# Patient Record
Sex: FEMALE | Race: WHITE | NOT HISPANIC OR LATINO | Employment: PART TIME | URBAN - METROPOLITAN AREA
[De-identification: names, ages, dates, MRNs, and addresses within clinical notes are randomized per-mention and may not be internally consistent; named-entity substitution may affect disease eponyms.]

---

## 2015-07-21 LAB — PAP SMEAR - HIM PATIENT REPORTED: NORMAL

## 2017-01-11 ENCOUNTER — OFFICE VISIT - HEALTHEAST (OUTPATIENT)
Dept: INTERNAL MEDICINE | Facility: CLINIC | Age: 57
End: 2017-01-11

## 2017-01-11 DIAGNOSIS — E87.6 HYPOKALEMIA: ICD-10-CM

## 2017-01-11 DIAGNOSIS — G47.00 INSOMNIA, UNSPECIFIED TYPE: ICD-10-CM

## 2017-01-11 DIAGNOSIS — E78.5 HYPERLIPIDEMIA: ICD-10-CM

## 2017-01-11 DIAGNOSIS — M81.0 OSTEOPOROSIS: ICD-10-CM

## 2017-01-11 LAB
CHOLEST SERPL-MCNC: 267 MG/DL
FASTING STATUS PATIENT QL REPORTED: YES
HDLC SERPL-MCNC: 76 MG/DL
LDLC SERPL CALC-MCNC: 170 MG/DL
TRIGL SERPL-MCNC: 103 MG/DL

## 2017-01-11 ASSESSMENT — MIFFLIN-ST. JEOR: SCORE: 1107.47

## 2017-01-12 ENCOUNTER — COMMUNICATION - HEALTHEAST (OUTPATIENT)
Dept: INTERNAL MEDICINE | Facility: CLINIC | Age: 57
End: 2017-01-12

## 2017-01-23 ENCOUNTER — AMBULATORY - HEALTHEAST (OUTPATIENT)
Dept: NURSING | Facility: CLINIC | Age: 57
End: 2017-01-23

## 2017-01-23 DIAGNOSIS — M81.0 OSTEOPOROSIS: ICD-10-CM

## 2017-01-24 ENCOUNTER — COMMUNICATION - HEALTHEAST (OUTPATIENT)
Dept: INTERNAL MEDICINE | Facility: CLINIC | Age: 57
End: 2017-01-24

## 2017-01-24 DIAGNOSIS — E78.5 HYPERLIPIDEMIA: ICD-10-CM

## 2017-01-25 ENCOUNTER — COMMUNICATION - HEALTHEAST (OUTPATIENT)
Dept: INTERNAL MEDICINE | Facility: CLINIC | Age: 57
End: 2017-01-25

## 2017-02-02 ENCOUNTER — RECORDS - HEALTHEAST (OUTPATIENT)
Dept: ADMINISTRATIVE | Facility: OTHER | Age: 57
End: 2017-02-02

## 2017-02-02 ENCOUNTER — TRANSFERRED RECORDS (OUTPATIENT)
Dept: HEALTH INFORMATION MANAGEMENT | Facility: CLINIC | Age: 57
End: 2017-02-02

## 2017-02-03 ENCOUNTER — COMMUNICATION - HEALTHEAST (OUTPATIENT)
Dept: INTERNAL MEDICINE | Facility: CLINIC | Age: 57
End: 2017-02-03

## 2017-02-06 ENCOUNTER — OFFICE VISIT - HEALTHEAST (OUTPATIENT)
Dept: INTERNAL MEDICINE | Facility: CLINIC | Age: 57
End: 2017-02-06

## 2017-02-06 DIAGNOSIS — Z92.25 PERSONAL HISTORY OF IMMUNOSUPRESSION THERAPY: ICD-10-CM

## 2017-02-06 DIAGNOSIS — J02.9 SORE THROAT: ICD-10-CM

## 2017-02-06 ASSESSMENT — MIFFLIN-ST. JEOR: SCORE: 1119.26

## 2017-03-08 ENCOUNTER — RECORDS - HEALTHEAST (OUTPATIENT)
Dept: ADMINISTRATIVE | Facility: OTHER | Age: 57
End: 2017-03-08

## 2017-03-09 ENCOUNTER — COMMUNICATION - HEALTHEAST (OUTPATIENT)
Dept: INTERNAL MEDICINE | Facility: CLINIC | Age: 57
End: 2017-03-09

## 2017-06-06 ENCOUNTER — RECORDS - HEALTHEAST (OUTPATIENT)
Dept: ADMINISTRATIVE | Facility: OTHER | Age: 57
End: 2017-06-06

## 2017-06-09 ENCOUNTER — OFFICE VISIT - HEALTHEAST (OUTPATIENT)
Dept: INTERNAL MEDICINE | Facility: CLINIC | Age: 57
End: 2017-06-09

## 2017-06-09 ENCOUNTER — COMMUNICATION - HEALTHEAST (OUTPATIENT)
Dept: INTERNAL MEDICINE | Facility: CLINIC | Age: 57
End: 2017-06-09

## 2017-06-09 DIAGNOSIS — M35.00 SICCA SYNDROME (H): ICD-10-CM

## 2017-06-09 DIAGNOSIS — R93.1 AGATSTON CORONARY ARTERY CALCIUM SCORE LESS THAN 100: ICD-10-CM

## 2017-06-09 DIAGNOSIS — E78.5 HYPERLIPIDEMIA: ICD-10-CM

## 2017-06-09 DIAGNOSIS — M54.6 RIGHT-SIDED THORACIC BACK PAIN: ICD-10-CM

## 2017-06-09 LAB
CHOLEST SERPL-MCNC: 183 MG/DL
FASTING STATUS PATIENT QL REPORTED: YES
HDLC SERPL-MCNC: 71 MG/DL
LDLC SERPL CALC-MCNC: 100 MG/DL
TRIGL SERPL-MCNC: 62 MG/DL

## 2017-06-09 ASSESSMENT — MIFFLIN-ST. JEOR: SCORE: 1116.54

## 2017-06-12 ENCOUNTER — RECORDS - HEALTHEAST (OUTPATIENT)
Dept: ADMINISTRATIVE | Facility: OTHER | Age: 57
End: 2017-06-12

## 2017-06-14 ENCOUNTER — COMMUNICATION - HEALTHEAST (OUTPATIENT)
Dept: INTERNAL MEDICINE | Facility: CLINIC | Age: 57
End: 2017-06-14

## 2017-06-14 DIAGNOSIS — F19.982 INSOMNIA DUE TO DRUG (H): ICD-10-CM

## 2017-07-11 ENCOUNTER — COMMUNICATION - HEALTHEAST (OUTPATIENT)
Dept: INTERNAL MEDICINE | Facility: CLINIC | Age: 57
End: 2017-07-11

## 2017-07-26 ENCOUNTER — AMBULATORY - HEALTHEAST (OUTPATIENT)
Dept: NURSING | Facility: CLINIC | Age: 57
End: 2017-07-26

## 2017-07-28 ENCOUNTER — RECORDS - HEALTHEAST (OUTPATIENT)
Dept: ADMINISTRATIVE | Facility: OTHER | Age: 57
End: 2017-07-28

## 2017-07-31 ENCOUNTER — AMBULATORY - HEALTHEAST (OUTPATIENT)
Dept: MULTI SPECIALTY CLINIC | Facility: CLINIC | Age: 57
End: 2017-07-31

## 2017-07-31 LAB — PAP SMEAR - HIM PATIENT REPORTED: NORMAL

## 2017-08-07 ENCOUNTER — RECORDS - HEALTHEAST (OUTPATIENT)
Dept: ADMINISTRATIVE | Facility: OTHER | Age: 57
End: 2017-08-07

## 2017-09-11 ENCOUNTER — RECORDS - HEALTHEAST (OUTPATIENT)
Dept: ADMINISTRATIVE | Facility: OTHER | Age: 57
End: 2017-09-11

## 2017-09-20 ENCOUNTER — AMBULATORY - HEALTHEAST (OUTPATIENT)
Dept: NURSING | Facility: CLINIC | Age: 57
End: 2017-09-20

## 2017-10-06 ENCOUNTER — RECORDS - HEALTHEAST (OUTPATIENT)
Dept: ADMINISTRATIVE | Facility: OTHER | Age: 57
End: 2017-10-06

## 2017-10-11 ENCOUNTER — RECORDS - HEALTHEAST (OUTPATIENT)
Dept: ADMINISTRATIVE | Facility: OTHER | Age: 57
End: 2017-10-11

## 2017-11-10 ENCOUNTER — RECORDS - HEALTHEAST (OUTPATIENT)
Dept: ADMINISTRATIVE | Facility: OTHER | Age: 57
End: 2017-11-10

## 2017-12-11 ENCOUNTER — RECORDS - HEALTHEAST (OUTPATIENT)
Dept: ADMINISTRATIVE | Facility: OTHER | Age: 57
End: 2017-12-11

## 2017-12-15 ENCOUNTER — OFFICE VISIT - HEALTHEAST (OUTPATIENT)
Dept: INTERNAL MEDICINE | Facility: CLINIC | Age: 57
End: 2017-12-15

## 2017-12-15 DIAGNOSIS — M81.8 OTHER OSTEOPOROSIS WITHOUT CURRENT PATHOLOGICAL FRACTURE: ICD-10-CM

## 2017-12-15 DIAGNOSIS — E78.5 HYPERLIPIDEMIA, UNSPECIFIED HYPERLIPIDEMIA TYPE: ICD-10-CM

## 2017-12-15 DIAGNOSIS — Z00.00 HEALTH CARE MAINTENANCE: ICD-10-CM

## 2017-12-15 DIAGNOSIS — F19.982 INSOMNIA DUE TO DRUG (H): ICD-10-CM

## 2017-12-15 DIAGNOSIS — Z79.899 MEDICATION MANAGEMENT: ICD-10-CM

## 2017-12-15 DIAGNOSIS — M35.00 SICCA SYNDROME (H): ICD-10-CM

## 2017-12-15 DIAGNOSIS — R93.1 AGATSTON CORONARY ARTERY CALCIUM SCORE LESS THAN 100: ICD-10-CM

## 2017-12-15 LAB
CHOLEST SERPL-MCNC: 240 MG/DL
FASTING STATUS PATIENT QL REPORTED: YES
HDLC SERPL-MCNC: 100 MG/DL
LDLC SERPL CALC-MCNC: 125 MG/DL
TRIGL SERPL-MCNC: 74 MG/DL

## 2017-12-15 RX ORDER — CETIRIZINE HYDROCHLORIDE 10 MG/1
10 TABLET ORAL DAILY
Status: SHIPPED | COMMUNITY
Start: 2017-12-15

## 2017-12-15 ASSESSMENT — MIFFLIN-ST. JEOR: SCORE: 1133.1

## 2017-12-18 ENCOUNTER — COMMUNICATION - HEALTHEAST (OUTPATIENT)
Dept: INTERNAL MEDICINE | Facility: CLINIC | Age: 57
End: 2017-12-18

## 2017-12-27 ENCOUNTER — COMMUNICATION - HEALTHEAST (OUTPATIENT)
Dept: INTERNAL MEDICINE | Facility: CLINIC | Age: 57
End: 2017-12-27

## 2018-01-23 ENCOUNTER — AMBULATORY - HEALTHEAST (OUTPATIENT)
Dept: INTERNAL MEDICINE | Facility: CLINIC | Age: 58
End: 2018-01-23

## 2018-01-23 DIAGNOSIS — M81.0 OSTEOPOROSIS: ICD-10-CM

## 2018-01-29 ENCOUNTER — AMBULATORY - HEALTHEAST (OUTPATIENT)
Dept: NURSING | Facility: CLINIC | Age: 58
End: 2018-01-29

## 2018-02-09 ENCOUNTER — COMMUNICATION - HEALTHEAST (OUTPATIENT)
Dept: INTERNAL MEDICINE | Facility: CLINIC | Age: 58
End: 2018-02-09

## 2018-02-09 DIAGNOSIS — E78.5 HYPERLIPIDEMIA: ICD-10-CM

## 2018-03-12 ENCOUNTER — RECORDS - HEALTHEAST (OUTPATIENT)
Dept: ADMINISTRATIVE | Facility: OTHER | Age: 58
End: 2018-03-12

## 2018-04-30 ENCOUNTER — RECORDS - HEALTHEAST (OUTPATIENT)
Dept: ADMINISTRATIVE | Facility: OTHER | Age: 58
End: 2018-04-30

## 2018-06-11 ENCOUNTER — RECORDS - HEALTHEAST (OUTPATIENT)
Dept: ADMINISTRATIVE | Facility: OTHER | Age: 58
End: 2018-06-11

## 2018-06-12 ENCOUNTER — COMMUNICATION - HEALTHEAST (OUTPATIENT)
Dept: INTERNAL MEDICINE | Facility: CLINIC | Age: 58
End: 2018-06-12

## 2018-06-12 ENCOUNTER — OFFICE VISIT - HEALTHEAST (OUTPATIENT)
Dept: INTERNAL MEDICINE | Facility: CLINIC | Age: 58
End: 2018-06-12

## 2018-06-12 DIAGNOSIS — M81.8 OTHER OSTEOPOROSIS WITHOUT CURRENT PATHOLOGICAL FRACTURE: ICD-10-CM

## 2018-06-12 DIAGNOSIS — E78.5 HYPERLIPIDEMIA, UNSPECIFIED HYPERLIPIDEMIA TYPE: ICD-10-CM

## 2018-06-12 DIAGNOSIS — J84.89 NSIP (NONSPECIFIC INTERSTITIAL PNEUMONIA) (H): ICD-10-CM

## 2018-06-12 DIAGNOSIS — M35.00 SICCA SYNDROME (H): ICD-10-CM

## 2018-06-12 LAB
ALBUMIN SERPL-MCNC: 4.2 G/DL (ref 3.5–5)
ALP SERPL-CCNC: 40 U/L (ref 45–120)
ALT SERPL W P-5'-P-CCNC: 19 U/L (ref 0–45)
ANION GAP SERPL CALCULATED.3IONS-SCNC: 10 MMOL/L (ref 5–18)
AST SERPL W P-5'-P-CCNC: 27 U/L (ref 0–40)
BILIRUB DIRECT SERPL-MCNC: 0.2 MG/DL
BILIRUB SERPL-MCNC: 0.6 MG/DL (ref 0–1)
BUN SERPL-MCNC: 21 MG/DL (ref 8–22)
CALCIUM SERPL-MCNC: 10.1 MG/DL (ref 8.5–10.5)
CHLORIDE BLD-SCNC: 101 MMOL/L (ref 98–107)
CHOLEST SERPL-MCNC: 200 MG/DL
CO2 SERPL-SCNC: 31 MMOL/L (ref 22–31)
CREAT SERPL-MCNC: 1.05 MG/DL (ref 0.6–1.1)
ERYTHROCYTE [DISTWIDTH] IN BLOOD BY AUTOMATED COUNT: 11.7 % (ref 11–14.5)
FASTING STATUS PATIENT QL REPORTED: YES
GFR SERPL CREATININE-BSD FRML MDRD: 54 ML/MIN/1.73M2
GLUCOSE BLD-MCNC: 80 MG/DL (ref 70–125)
HCT VFR BLD AUTO: 41.2 % (ref 35–47)
HDLC SERPL-MCNC: 88 MG/DL
HGB BLD-MCNC: 14.2 G/DL (ref 12–16)
LDLC SERPL CALC-MCNC: 99 MG/DL
MCH RBC QN AUTO: 32.1 PG (ref 27–34)
MCHC RBC AUTO-ENTMCNC: 34.4 G/DL (ref 32–36)
MCV RBC AUTO: 93 FL (ref 80–100)
PLATELET # BLD AUTO: 208 THOU/UL (ref 140–440)
PMV BLD AUTO: 6.8 FL (ref 7–10)
POTASSIUM BLD-SCNC: 3.5 MMOL/L (ref 3.5–5)
PROT SERPL-MCNC: 6.5 G/DL (ref 6–8)
RBC # BLD AUTO: 4.41 MILL/UL (ref 3.8–5.4)
SODIUM SERPL-SCNC: 142 MMOL/L (ref 136–145)
TRIGL SERPL-MCNC: 67 MG/DL
WBC: 4.2 THOU/UL (ref 4–11)

## 2018-07-05 ENCOUNTER — COMMUNICATION - HEALTHEAST (OUTPATIENT)
Dept: INTERNAL MEDICINE | Facility: CLINIC | Age: 58
End: 2018-07-05

## 2018-07-09 ENCOUNTER — COMMUNICATION - HEALTHEAST (OUTPATIENT)
Dept: PHYSICAL MEDICINE AND REHAB | Facility: CLINIC | Age: 58
End: 2018-07-09

## 2018-07-09 ENCOUNTER — HOSPITAL ENCOUNTER (OUTPATIENT)
Dept: PHYSICAL MEDICINE AND REHAB | Facility: CLINIC | Age: 58
Discharge: HOME | End: 2018-07-09
Attending: PHYSICIAN ASSISTANT

## 2018-07-09 DIAGNOSIS — M54.16 LUMBAR RADICULITIS: ICD-10-CM

## 2018-07-09 DIAGNOSIS — M47.816 FACET SYNDROME, LUMBAR: ICD-10-CM

## 2018-07-09 DIAGNOSIS — M54.50 LOWER BACK PAIN: ICD-10-CM

## 2018-07-09 ASSESSMENT — MIFFLIN-ST. JEOR: SCORE: 1129.47

## 2018-07-10 ENCOUNTER — COMMUNICATION - HEALTHEAST (OUTPATIENT)
Dept: INTERNAL MEDICINE | Facility: CLINIC | Age: 58
End: 2018-07-10

## 2018-07-10 DIAGNOSIS — M54.6 RIGHT-SIDED THORACIC BACK PAIN: ICD-10-CM

## 2018-07-19 ENCOUNTER — COMMUNICATION - HEALTHEAST (OUTPATIENT)
Dept: INTERNAL MEDICINE | Facility: CLINIC | Age: 58
End: 2018-07-19

## 2018-07-19 DIAGNOSIS — R10.84 ABDOMINAL PAIN, GENERALIZED: ICD-10-CM

## 2018-07-23 ENCOUNTER — AMBULATORY - HEALTHEAST (OUTPATIENT)
Dept: INTERNAL MEDICINE | Facility: CLINIC | Age: 58
End: 2018-07-23

## 2018-07-23 DIAGNOSIS — M81.0 OSTEOPOROSIS: ICD-10-CM

## 2018-07-26 ENCOUNTER — AMBULATORY - HEALTHEAST (OUTPATIENT)
Dept: INTERNAL MEDICINE | Facility: CLINIC | Age: 58
End: 2018-07-26

## 2018-07-26 DIAGNOSIS — R10.13 DYSPEPSIA: ICD-10-CM

## 2018-07-30 ENCOUNTER — AMBULATORY - HEALTHEAST (OUTPATIENT)
Dept: NURSING | Facility: CLINIC | Age: 58
End: 2018-07-30

## 2018-07-30 ENCOUNTER — RECORDS - HEALTHEAST (OUTPATIENT)
Dept: ADMINISTRATIVE | Facility: OTHER | Age: 58
End: 2018-07-30

## 2018-07-30 ENCOUNTER — COMMUNICATION - HEALTHEAST (OUTPATIENT)
Dept: INTERNAL MEDICINE | Facility: CLINIC | Age: 58
End: 2018-07-30

## 2018-08-01 ENCOUNTER — RECORDS - HEALTHEAST (OUTPATIENT)
Dept: ADMINISTRATIVE | Facility: OTHER | Age: 58
End: 2018-08-01

## 2018-08-07 ENCOUNTER — COMMUNICATION - HEALTHEAST (OUTPATIENT)
Dept: INTERNAL MEDICINE | Facility: CLINIC | Age: 58
End: 2018-08-07

## 2018-08-07 DIAGNOSIS — E78.5 HYPERLIPIDEMIA: ICD-10-CM

## 2018-08-15 ENCOUNTER — RECORDS - HEALTHEAST (OUTPATIENT)
Dept: ADMINISTRATIVE | Facility: OTHER | Age: 58
End: 2018-08-15

## 2018-09-04 ENCOUNTER — COMMUNICATION - HEALTHEAST (OUTPATIENT)
Dept: INTERNAL MEDICINE | Facility: CLINIC | Age: 58
End: 2018-09-04

## 2018-09-04 DIAGNOSIS — Z79.899 MEDICATION MANAGEMENT: ICD-10-CM

## 2018-09-10 ENCOUNTER — RECORDS - HEALTHEAST (OUTPATIENT)
Dept: ADMINISTRATIVE | Facility: OTHER | Age: 58
End: 2018-09-10

## 2018-09-13 ENCOUNTER — RECORDS - HEALTHEAST (OUTPATIENT)
Dept: ADMINISTRATIVE | Facility: OTHER | Age: 58
End: 2018-09-13

## 2018-10-15 ENCOUNTER — RECORDS - HEALTHEAST (OUTPATIENT)
Dept: ADMINISTRATIVE | Facility: OTHER | Age: 58
End: 2018-10-15

## 2018-10-22 ENCOUNTER — RECORDS - HEALTHEAST (OUTPATIENT)
Dept: ADMINISTRATIVE | Facility: OTHER | Age: 58
End: 2018-10-22

## 2018-10-26 ENCOUNTER — RECORDS - HEALTHEAST (OUTPATIENT)
Dept: ADMINISTRATIVE | Facility: OTHER | Age: 58
End: 2018-10-26

## 2018-11-05 ENCOUNTER — RECORDS - HEALTHEAST (OUTPATIENT)
Dept: ADMINISTRATIVE | Facility: OTHER | Age: 58
End: 2018-11-05

## 2018-12-03 ENCOUNTER — RECORDS - HEALTHEAST (OUTPATIENT)
Dept: ADMINISTRATIVE | Facility: OTHER | Age: 58
End: 2018-12-03

## 2018-12-03 ENCOUNTER — RECORDS - HEALTHEAST (OUTPATIENT)
Dept: BONE DENSITY | Facility: CLINIC | Age: 58
End: 2018-12-03

## 2018-12-03 DIAGNOSIS — Z79.899 OTHER LONG TERM (CURRENT) DRUG THERAPY: ICD-10-CM

## 2018-12-10 ENCOUNTER — RECORDS - HEALTHEAST (OUTPATIENT)
Dept: ADMINISTRATIVE | Facility: OTHER | Age: 58
End: 2018-12-10

## 2018-12-11 ENCOUNTER — RECORDS - HEALTHEAST (OUTPATIENT)
Dept: ADMINISTRATIVE | Facility: OTHER | Age: 58
End: 2018-12-11

## 2018-12-12 ENCOUNTER — OFFICE VISIT - HEALTHEAST (OUTPATIENT)
Dept: INTERNAL MEDICINE | Facility: CLINIC | Age: 58
End: 2018-12-12

## 2018-12-12 DIAGNOSIS — M35.00 SICCA SYNDROME (H): ICD-10-CM

## 2018-12-12 DIAGNOSIS — Z00.00 PREVENTATIVE HEALTH CARE: ICD-10-CM

## 2018-12-12 DIAGNOSIS — M81.0 SENILE OSTEOPOROSIS: ICD-10-CM

## 2018-12-12 DIAGNOSIS — Z51.81 MEDICATION MONITORING ENCOUNTER: ICD-10-CM

## 2018-12-12 DIAGNOSIS — R93.1 AGATSTON CORONARY ARTERY CALCIUM SCORE LESS THAN 100: ICD-10-CM

## 2018-12-12 DIAGNOSIS — E78.00 HYPERCHOLESTEREMIA: ICD-10-CM

## 2018-12-12 LAB
ALBUMIN SERPL-MCNC: 4.1 G/DL (ref 3.5–5)
ALP SERPL-CCNC: 53 U/L (ref 45–120)
ALT SERPL W P-5'-P-CCNC: 13 U/L (ref 0–45)
ANION GAP SERPL CALCULATED.3IONS-SCNC: 10 MMOL/L (ref 5–18)
AST SERPL W P-5'-P-CCNC: 27 U/L (ref 0–40)
BILIRUB SERPL-MCNC: 0.8 MG/DL (ref 0–1)
BUN SERPL-MCNC: 15 MG/DL (ref 8–22)
CALCIUM SERPL-MCNC: 9.3 MG/DL (ref 8.5–10.5)
CHLORIDE BLD-SCNC: 102 MMOL/L (ref 98–107)
CHOLEST SERPL-MCNC: 195 MG/DL
CO2 SERPL-SCNC: 30 MMOL/L (ref 22–31)
CREAT SERPL-MCNC: 0.83 MG/DL (ref 0.6–1.1)
FASTING STATUS PATIENT QL REPORTED: YES
GFR SERPL CREATININE-BSD FRML MDRD: >60 ML/MIN/1.73M2
GLUCOSE BLD-MCNC: 88 MG/DL (ref 70–125)
HBA1C MFR BLD: 5.5 % (ref 3.5–6)
HDLC SERPL-MCNC: 99 MG/DL
LDLC SERPL CALC-MCNC: 85 MG/DL
POTASSIUM BLD-SCNC: 3.4 MMOL/L (ref 3.5–5)
PROT SERPL-MCNC: 6.6 G/DL (ref 6–8)
SODIUM SERPL-SCNC: 142 MMOL/L (ref 136–145)
TRIGL SERPL-MCNC: 53 MG/DL
TSH SERPL DL<=0.005 MIU/L-ACNC: 0.93 UIU/ML (ref 0.3–5)

## 2018-12-12 ASSESSMENT — MIFFLIN-ST. JEOR: SCORE: 1110.64

## 2018-12-13 LAB
25(OH)D3 SERPL-MCNC: 60.8 NG/ML (ref 30–80)
25(OH)D3 SERPL-MCNC: 60.8 NG/ML (ref 30–80)

## 2018-12-20 ENCOUNTER — COMMUNICATION - HEALTHEAST (OUTPATIENT)
Dept: INTERNAL MEDICINE | Facility: CLINIC | Age: 58
End: 2018-12-20

## 2018-12-20 DIAGNOSIS — F19.982 INSOMNIA DUE TO DRUG (H): ICD-10-CM

## 2018-12-22 ENCOUNTER — COMMUNICATION - HEALTHEAST (OUTPATIENT)
Dept: INTERNAL MEDICINE | Facility: CLINIC | Age: 58
End: 2018-12-22

## 2019-01-08 ENCOUNTER — COMMUNICATION - HEALTHEAST (OUTPATIENT)
Dept: INTERNAL MEDICINE | Facility: CLINIC | Age: 59
End: 2019-01-08

## 2019-01-29 ENCOUNTER — COMMUNICATION - HEALTHEAST (OUTPATIENT)
Dept: INTERNAL MEDICINE | Facility: CLINIC | Age: 59
End: 2019-01-29

## 2019-01-31 ENCOUNTER — AMBULATORY - HEALTHEAST (OUTPATIENT)
Dept: NURSING | Facility: CLINIC | Age: 59
End: 2019-01-31

## 2019-03-11 ENCOUNTER — RECORDS - HEALTHEAST (OUTPATIENT)
Dept: ADMINISTRATIVE | Facility: OTHER | Age: 59
End: 2019-03-11

## 2019-03-22 ENCOUNTER — RECORDS - HEALTHEAST (OUTPATIENT)
Dept: ADMINISTRATIVE | Facility: OTHER | Age: 59
End: 2019-03-22

## 2019-04-06 ENCOUNTER — COMMUNICATION - HEALTHEAST (OUTPATIENT)
Dept: INTERNAL MEDICINE | Facility: CLINIC | Age: 59
End: 2019-04-06

## 2019-04-06 DIAGNOSIS — G47.00 INSOMNIA: ICD-10-CM

## 2019-04-09 ENCOUNTER — COMMUNICATION - HEALTHEAST (OUTPATIENT)
Dept: INTERNAL MEDICINE | Facility: CLINIC | Age: 59
End: 2019-04-09

## 2019-04-15 ENCOUNTER — RECORDS - HEALTHEAST (OUTPATIENT)
Dept: ADMINISTRATIVE | Facility: OTHER | Age: 59
End: 2019-04-15

## 2019-05-15 ENCOUNTER — COMMUNICATION - HEALTHEAST (OUTPATIENT)
Dept: INTERNAL MEDICINE | Facility: CLINIC | Age: 59
End: 2019-05-15

## 2019-05-15 DIAGNOSIS — E78.5 HYPERLIPIDEMIA: ICD-10-CM

## 2019-06-10 ENCOUNTER — RECORDS - HEALTHEAST (OUTPATIENT)
Dept: ADMINISTRATIVE | Facility: OTHER | Age: 59
End: 2019-06-10

## 2019-06-11 ENCOUNTER — OFFICE VISIT - HEALTHEAST (OUTPATIENT)
Dept: INTERNAL MEDICINE | Facility: CLINIC | Age: 59
End: 2019-06-11

## 2019-06-11 ENCOUNTER — RECORDS - HEALTHEAST (OUTPATIENT)
Dept: ADMINISTRATIVE | Facility: OTHER | Age: 59
End: 2019-06-11

## 2019-06-11 DIAGNOSIS — Z51.81 MEDICATION MONITORING ENCOUNTER: ICD-10-CM

## 2019-06-11 DIAGNOSIS — E87.6 HYPOKALEMIA: ICD-10-CM

## 2019-06-11 DIAGNOSIS — E78.5 HYPERLIPIDEMIA LDL GOAL <130: ICD-10-CM

## 2019-06-11 DIAGNOSIS — M35.00 SICCA SYNDROME (H): ICD-10-CM

## 2019-06-11 DIAGNOSIS — R93.1 AGATSTON CORONARY ARTERY CALCIUM SCORE LESS THAN 100: ICD-10-CM

## 2019-06-11 LAB
CHOLEST SERPL-MCNC: 189 MG/DL
FASTING STATUS PATIENT QL REPORTED: YES
HDLC SERPL-MCNC: 83 MG/DL
LDLC SERPL CALC-MCNC: 92 MG/DL
TRIGL SERPL-MCNC: 70 MG/DL

## 2019-06-11 ASSESSMENT — MIFFLIN-ST. JEOR: SCORE: 1120.62

## 2019-06-17 ENCOUNTER — COMMUNICATION - HEALTHEAST (OUTPATIENT)
Dept: INTERNAL MEDICINE | Facility: CLINIC | Age: 59
End: 2019-06-17

## 2019-06-17 ENCOUNTER — AMBULATORY - HEALTHEAST (OUTPATIENT)
Dept: INTERNAL MEDICINE | Facility: CLINIC | Age: 59
End: 2019-06-17

## 2019-06-17 DIAGNOSIS — E87.6 HYPOKALEMIA: ICD-10-CM

## 2019-06-18 ENCOUNTER — AMBULATORY - HEALTHEAST (OUTPATIENT)
Dept: LAB | Facility: CLINIC | Age: 59
End: 2019-06-18

## 2019-06-18 DIAGNOSIS — E87.6 HYPOKALEMIA: ICD-10-CM

## 2019-06-18 LAB
ANION GAP SERPL CALCULATED.3IONS-SCNC: 8 MMOL/L (ref 5–18)
BUN SERPL-MCNC: 13 MG/DL (ref 8–22)
CALCIUM SERPL-MCNC: 10.3 MG/DL (ref 8.5–10.5)
CHLORIDE BLD-SCNC: 100 MMOL/L (ref 98–107)
CO2 SERPL-SCNC: 32 MMOL/L (ref 22–31)
CREAT SERPL-MCNC: 0.96 MG/DL (ref 0.6–1.1)
GFR SERPL CREATININE-BSD FRML MDRD: 59 ML/MIN/1.73M2
GLUCOSE BLD-MCNC: 89 MG/DL (ref 70–125)
POTASSIUM BLD-SCNC: 3.9 MMOL/L (ref 3.5–5)
SODIUM SERPL-SCNC: 140 MMOL/L (ref 136–145)

## 2019-07-01 ENCOUNTER — COMMUNICATION - HEALTHEAST (OUTPATIENT)
Dept: INTERNAL MEDICINE | Facility: CLINIC | Age: 59
End: 2019-07-01

## 2019-07-01 DIAGNOSIS — I10 BENIGN ESSENTIAL HYPERTENSION: ICD-10-CM

## 2019-08-08 ENCOUNTER — AMBULATORY - HEALTHEAST (OUTPATIENT)
Dept: INTERNAL MEDICINE | Facility: CLINIC | Age: 59
End: 2019-08-08

## 2019-08-08 DIAGNOSIS — M81.0 OSTEOPOROSIS: ICD-10-CM

## 2019-08-12 ENCOUNTER — COMMUNICATION - HEALTHEAST (OUTPATIENT)
Dept: INTERNAL MEDICINE | Facility: CLINIC | Age: 59
End: 2019-08-12

## 2019-08-12 ENCOUNTER — AMBULATORY - HEALTHEAST (OUTPATIENT)
Dept: NURSING | Facility: CLINIC | Age: 59
End: 2019-08-12

## 2019-09-09 ENCOUNTER — RECORDS - HEALTHEAST (OUTPATIENT)
Dept: ADMINISTRATIVE | Facility: OTHER | Age: 59
End: 2019-09-09

## 2019-09-10 ENCOUNTER — RECORDS - HEALTHEAST (OUTPATIENT)
Dept: ADMINISTRATIVE | Facility: OTHER | Age: 59
End: 2019-09-10

## 2019-09-26 ENCOUNTER — COMMUNICATION - HEALTHEAST (OUTPATIENT)
Dept: INTERNAL MEDICINE | Facility: CLINIC | Age: 59
End: 2019-09-26

## 2019-09-26 DIAGNOSIS — G47.00 INSOMNIA: ICD-10-CM

## 2019-10-23 ENCOUNTER — RECORDS - HEALTHEAST (OUTPATIENT)
Dept: ADMINISTRATIVE | Facility: OTHER | Age: 59
End: 2019-10-23

## 2019-10-31 ENCOUNTER — OFFICE VISIT - HEALTHEAST (OUTPATIENT)
Dept: FAMILY MEDICINE | Facility: CLINIC | Age: 59
End: 2019-10-31

## 2019-10-31 DIAGNOSIS — B34.9 VIRAL SYNDROME: ICD-10-CM

## 2019-10-31 ASSESSMENT — MIFFLIN-ST. JEOR: SCORE: 1126.07

## 2019-11-07 ENCOUNTER — COMMUNICATION - HEALTHEAST (OUTPATIENT)
Dept: INTERNAL MEDICINE | Facility: CLINIC | Age: 59
End: 2019-11-07

## 2019-11-08 ENCOUNTER — COMMUNICATION - HEALTHEAST (OUTPATIENT)
Dept: INTERNAL MEDICINE | Facility: CLINIC | Age: 59
End: 2019-11-08

## 2019-11-08 ENCOUNTER — RECORDS - HEALTHEAST (OUTPATIENT)
Dept: ADMINISTRATIVE | Facility: OTHER | Age: 59
End: 2019-11-08

## 2019-11-08 ENCOUNTER — OFFICE VISIT - HEALTHEAST (OUTPATIENT)
Dept: INTERNAL MEDICINE | Facility: CLINIC | Age: 59
End: 2019-11-08

## 2019-11-08 ENCOUNTER — RECORDS - HEALTHEAST (OUTPATIENT)
Dept: GENERAL RADIOLOGY | Facility: CLINIC | Age: 59
End: 2019-11-08

## 2019-11-08 DIAGNOSIS — M79.672 FOOT PAIN, BILATERAL: ICD-10-CM

## 2019-11-08 DIAGNOSIS — J84.89 NSIP (NONSPECIFIC INTERSTITIAL PNEUMONIA) (H): ICD-10-CM

## 2019-11-08 DIAGNOSIS — M79.671 FOOT PAIN, BILATERAL: ICD-10-CM

## 2019-11-08 DIAGNOSIS — R07.81 PLEURODYNIA: ICD-10-CM

## 2019-11-08 DIAGNOSIS — R07.81 RIB PAIN: ICD-10-CM

## 2019-11-08 DIAGNOSIS — S22.31XA CLOSED FRACTURE OF ONE RIB OF RIGHT SIDE, INITIAL ENCOUNTER: ICD-10-CM

## 2019-11-16 ENCOUNTER — COMMUNICATION - HEALTHEAST (OUTPATIENT)
Dept: INTERNAL MEDICINE | Facility: CLINIC | Age: 59
End: 2019-11-16

## 2019-11-16 DIAGNOSIS — E78.5 HYPERLIPIDEMIA: ICD-10-CM

## 2019-11-18 ENCOUNTER — COMMUNICATION - HEALTHEAST (OUTPATIENT)
Dept: INTERNAL MEDICINE | Facility: CLINIC | Age: 59
End: 2019-11-18

## 2019-11-18 DIAGNOSIS — F19.982 INSOMNIA DUE TO DRUG (H): ICD-10-CM

## 2019-12-04 ENCOUNTER — COMMUNICATION - HEALTHEAST (OUTPATIENT)
Dept: INTERNAL MEDICINE | Facility: CLINIC | Age: 59
End: 2019-12-04

## 2019-12-04 DIAGNOSIS — M89.8X7: ICD-10-CM

## 2019-12-04 DIAGNOSIS — M21.70 LEG LENGTH DISCREPANCY: ICD-10-CM

## 2019-12-09 ENCOUNTER — RECORDS - HEALTHEAST (OUTPATIENT)
Dept: ADMINISTRATIVE | Facility: OTHER | Age: 59
End: 2019-12-09

## 2019-12-11 ENCOUNTER — OFFICE VISIT - HEALTHEAST (OUTPATIENT)
Dept: INTERNAL MEDICINE | Facility: CLINIC | Age: 59
End: 2019-12-11

## 2019-12-11 DIAGNOSIS — Z00.00 ENCOUNTER FOR PREVENTIVE CARE: ICD-10-CM

## 2019-12-11 DIAGNOSIS — M81.8 OTHER OSTEOPOROSIS WITHOUT CURRENT PATHOLOGICAL FRACTURE: ICD-10-CM

## 2019-12-11 DIAGNOSIS — E87.6 HYPOKALEMIA: ICD-10-CM

## 2019-12-11 DIAGNOSIS — J84.89 NSIP (NONSPECIFIC INTERSTITIAL PNEUMONIA) (H): ICD-10-CM

## 2019-12-11 DIAGNOSIS — H61.22 IMPACTED CERUMEN OF LEFT EAR: ICD-10-CM

## 2019-12-11 DIAGNOSIS — R93.1 AGATSTON CORONARY ARTERY CALCIUM SCORE LESS THAN 100: ICD-10-CM

## 2019-12-11 LAB
ANION GAP SERPL CALCULATED.3IONS-SCNC: 10 MMOL/L (ref 5–18)
BUN SERPL-MCNC: 13 MG/DL (ref 8–22)
CALCIUM SERPL-MCNC: 9.1 MG/DL (ref 8.5–10.5)
CHLORIDE BLD-SCNC: 100 MMOL/L (ref 98–107)
CHOLEST SERPL-MCNC: 182 MG/DL
CO2 SERPL-SCNC: 31 MMOL/L (ref 22–31)
CREAT SERPL-MCNC: 0.94 MG/DL (ref 0.6–1.1)
FASTING STATUS PATIENT QL REPORTED: YES
GFR SERPL CREATININE-BSD FRML MDRD: >60 ML/MIN/1.73M2
GLUCOSE BLD-MCNC: 91 MG/DL (ref 70–125)
HDLC SERPL-MCNC: 73 MG/DL
LDLC SERPL CALC-MCNC: 93 MG/DL
POTASSIUM BLD-SCNC: 3.3 MMOL/L (ref 3.5–5)
SODIUM SERPL-SCNC: 141 MMOL/L (ref 136–145)
TRIGL SERPL-MCNC: 78 MG/DL

## 2019-12-11 ASSESSMENT — MIFFLIN-ST. JEOR: SCORE: 1127.43

## 2019-12-12 ENCOUNTER — COMMUNICATION - HEALTHEAST (OUTPATIENT)
Dept: INTERNAL MEDICINE | Facility: CLINIC | Age: 59
End: 2019-12-12

## 2019-12-12 LAB
25(OH)D3 SERPL-MCNC: 47.2 NG/ML (ref 30–80)
25(OH)D3 SERPL-MCNC: 47.2 NG/ML (ref 30–80)

## 2020-02-04 ENCOUNTER — COMMUNICATION - HEALTHEAST (OUTPATIENT)
Dept: INTERNAL MEDICINE | Facility: CLINIC | Age: 60
End: 2020-02-04

## 2020-02-04 DIAGNOSIS — I10 BENIGN ESSENTIAL HYPERTENSION: ICD-10-CM

## 2020-02-09 ENCOUNTER — COMMUNICATION - HEALTHEAST (OUTPATIENT)
Dept: INTERNAL MEDICINE | Facility: CLINIC | Age: 60
End: 2020-02-09

## 2020-02-17 ENCOUNTER — COMMUNICATION - HEALTHEAST (OUTPATIENT)
Dept: INTERNAL MEDICINE | Facility: CLINIC | Age: 60
End: 2020-02-17

## 2020-02-17 DIAGNOSIS — R05.9 COUGH: ICD-10-CM

## 2020-02-21 ENCOUNTER — COMMUNICATION - HEALTHEAST (OUTPATIENT)
Dept: INTERNAL MEDICINE | Facility: CLINIC | Age: 60
End: 2020-02-21

## 2020-02-24 ENCOUNTER — COMMUNICATION - HEALTHEAST (OUTPATIENT)
Dept: INTERNAL MEDICINE | Facility: CLINIC | Age: 60
End: 2020-02-24

## 2020-02-24 ENCOUNTER — AMBULATORY - HEALTHEAST (OUTPATIENT)
Dept: NURSING | Facility: CLINIC | Age: 60
End: 2020-02-24

## 2020-02-26 ENCOUNTER — RECORDS - HEALTHEAST (OUTPATIENT)
Dept: HEALTH INFORMATION MANAGEMENT | Facility: CLINIC | Age: 60
End: 2020-02-26

## 2020-03-06 ENCOUNTER — COMMUNICATION - HEALTHEAST (OUTPATIENT)
Dept: INTERNAL MEDICINE | Facility: CLINIC | Age: 60
End: 2020-03-06

## 2020-03-06 DIAGNOSIS — E87.6 HYPOKALEMIA: ICD-10-CM

## 2020-03-09 ENCOUNTER — RECORDS - HEALTHEAST (OUTPATIENT)
Dept: ADMINISTRATIVE | Facility: OTHER | Age: 60
End: 2020-03-09

## 2020-03-16 ENCOUNTER — COMMUNICATION - HEALTHEAST (OUTPATIENT)
Dept: INTERNAL MEDICINE | Facility: CLINIC | Age: 60
End: 2020-03-16

## 2020-03-16 DIAGNOSIS — G47.00 INSOMNIA: ICD-10-CM

## 2020-06-01 ENCOUNTER — RECORDS - HEALTHEAST (OUTPATIENT)
Dept: ADMINISTRATIVE | Facility: OTHER | Age: 60
End: 2020-06-01

## 2020-06-08 ENCOUNTER — RECORDS - HEALTHEAST (OUTPATIENT)
Dept: ADMINISTRATIVE | Facility: OTHER | Age: 60
End: 2020-06-08

## 2020-06-08 LAB
ALT SERPL W/O P-5'-P-CCNC: 17 IU/L (ref 5–35)
AST SERPL-CCNC: 35 U/L (ref 5–34)
CREAT SERPL-MCNC: 0.93 MG/DL (ref 0.5–1.3)

## 2020-07-06 ENCOUNTER — RECORDS - HEALTHEAST (OUTPATIENT)
Dept: HEALTH INFORMATION MANAGEMENT | Facility: CLINIC | Age: 60
End: 2020-07-06

## 2020-07-10 ENCOUNTER — OFFICE VISIT - HEALTHEAST (OUTPATIENT)
Dept: INTERNAL MEDICINE | Facility: CLINIC | Age: 60
End: 2020-07-10

## 2020-07-10 DIAGNOSIS — M81.8 OTHER OSTEOPOROSIS WITHOUT CURRENT PATHOLOGICAL FRACTURE: ICD-10-CM

## 2020-07-10 DIAGNOSIS — M54.6 RIGHT-SIDED THORACIC BACK PAIN: ICD-10-CM

## 2020-07-10 DIAGNOSIS — Z79.890 H/O LONG-TERM (CURRENT) USE OF POSTMENOPAUSAL HORMONE REPLACEMENT THERAPY: ICD-10-CM

## 2020-07-10 DIAGNOSIS — B37.31 YEAST INFECTION OF THE VAGINA: ICD-10-CM

## 2020-07-10 DIAGNOSIS — D69.1 PLATELET DISORDER (H): ICD-10-CM

## 2020-07-10 DIAGNOSIS — Z00.00 ENCOUNTER FOR PREVENTIVE CARE: ICD-10-CM

## 2020-07-10 DIAGNOSIS — J84.89 NSIP (NONSPECIFIC INTERSTITIAL PNEUMONIA) (H): ICD-10-CM

## 2020-07-10 RX ORDER — CYCLOBENZAPRINE HCL 10 MG
10 TABLET ORAL DAILY PRN
Qty: 90 TABLET | Refills: 5 | Status: SHIPPED | OUTPATIENT
Start: 2020-07-10 | End: 2021-09-06

## 2020-07-10 RX ORDER — AZATHIOPRINE 50 MG/1
1 TABLET ORAL DAILY
Status: SHIPPED | COMMUNITY
Start: 2020-06-22

## 2020-07-10 RX ORDER — FLUCONAZOLE 150 MG/1
TABLET ORAL
Qty: 2 TABLET | Refills: 0 | Status: SHIPPED | OUTPATIENT
Start: 2020-07-10 | End: 2021-07-26

## 2020-07-21 ENCOUNTER — RECORDS - HEALTHEAST (OUTPATIENT)
Dept: ADMINISTRATIVE | Facility: OTHER | Age: 60
End: 2020-07-21

## 2020-07-21 LAB — COLOGUARD-ABSTRACT: NEGATIVE

## 2020-07-23 ENCOUNTER — RECORDS - HEALTHEAST (OUTPATIENT)
Dept: ADMINISTRATIVE | Facility: OTHER | Age: 60
End: 2020-07-23

## 2020-07-31 ENCOUNTER — RECORDS - HEALTHEAST (OUTPATIENT)
Dept: ADMINISTRATIVE | Facility: OTHER | Age: 60
End: 2020-07-31

## 2020-08-03 ENCOUNTER — COMMUNICATION - HEALTHEAST (OUTPATIENT)
Dept: INTERNAL MEDICINE | Facility: CLINIC | Age: 60
End: 2020-08-03

## 2020-08-04 ENCOUNTER — RECORDS - HEALTHEAST (OUTPATIENT)
Dept: HEALTH INFORMATION MANAGEMENT | Facility: CLINIC | Age: 60
End: 2020-08-04

## 2020-08-24 ENCOUNTER — RECORDS - HEALTHEAST (OUTPATIENT)
Dept: ADMINISTRATIVE | Facility: OTHER | Age: 60
End: 2020-08-24

## 2020-08-26 ENCOUNTER — COMMUNICATION - HEALTHEAST (OUTPATIENT)
Dept: GENERAL RADIOLOGY | Facility: CLINIC | Age: 60
End: 2020-08-26

## 2020-08-27 ENCOUNTER — AMBULATORY - HEALTHEAST (OUTPATIENT)
Dept: NURSING | Facility: CLINIC | Age: 60
End: 2020-08-27

## 2020-08-27 ENCOUNTER — AMBULATORY - HEALTHEAST (OUTPATIENT)
Dept: INTERNAL MEDICINE | Facility: CLINIC | Age: 60
End: 2020-08-27

## 2020-08-27 DIAGNOSIS — M81.8 OTHER OSTEOPOROSIS WITHOUT CURRENT PATHOLOGICAL FRACTURE: ICD-10-CM

## 2020-09-14 ENCOUNTER — RECORDS - HEALTHEAST (OUTPATIENT)
Dept: ADMINISTRATIVE | Facility: OTHER | Age: 60
End: 2020-09-14

## 2020-09-14 LAB
ALT SERPL W/O P-5'-P-CCNC: 21 IU/L (ref 5–35)
AST SERPL-CCNC: 41 U/L (ref 5–34)
CREAT SERPL-MCNC: 0.94 MG/DL (ref 0.5–1.3)

## 2020-09-15 ENCOUNTER — RECORDS - HEALTHEAST (OUTPATIENT)
Dept: ADMINISTRATIVE | Facility: OTHER | Age: 60
End: 2020-09-15

## 2020-09-21 ENCOUNTER — RECORDS - HEALTHEAST (OUTPATIENT)
Dept: ADMINISTRATIVE | Facility: OTHER | Age: 60
End: 2020-09-21

## 2020-09-29 ENCOUNTER — RECORDS - HEALTHEAST (OUTPATIENT)
Dept: ADMINISTRATIVE | Facility: OTHER | Age: 60
End: 2020-09-29

## 2020-10-05 ENCOUNTER — COMMUNICATION - HEALTHEAST (OUTPATIENT)
Dept: INTERNAL MEDICINE | Facility: CLINIC | Age: 60
End: 2020-10-05

## 2020-10-05 DIAGNOSIS — R21 RASH: ICD-10-CM

## 2020-10-07 ENCOUNTER — RECORDS - HEALTHEAST (OUTPATIENT)
Dept: HEALTH INFORMATION MANAGEMENT | Facility: CLINIC | Age: 60
End: 2020-10-07

## 2020-10-08 ENCOUNTER — COMMUNICATION - HEALTHEAST (OUTPATIENT)
Dept: INTERNAL MEDICINE | Facility: CLINIC | Age: 60
End: 2020-10-08

## 2020-10-27 ENCOUNTER — COMMUNICATION - HEALTHEAST (OUTPATIENT)
Dept: INTERNAL MEDICINE | Facility: CLINIC | Age: 60
End: 2020-10-27

## 2020-11-09 ENCOUNTER — COMMUNICATION - HEALTHEAST (OUTPATIENT)
Dept: INTERNAL MEDICINE | Facility: CLINIC | Age: 60
End: 2020-11-09

## 2020-11-09 DIAGNOSIS — E78.5 HYPERLIPIDEMIA: ICD-10-CM

## 2020-11-12 RX ORDER — ATORVASTATIN CALCIUM 10 MG/1
10 TABLET, FILM COATED ORAL AT BEDTIME
Qty: 90 TABLET | Refills: 2 | Status: SHIPPED | OUTPATIENT
Start: 2020-11-12 | End: 2021-09-03

## 2020-11-17 ENCOUNTER — RECORDS - HEALTHEAST (OUTPATIENT)
Dept: ADMINISTRATIVE | Facility: OTHER | Age: 60
End: 2020-11-17

## 2020-11-20 ENCOUNTER — COMMUNICATION - HEALTHEAST (OUTPATIENT)
Dept: INTERNAL MEDICINE | Facility: CLINIC | Age: 60
End: 2020-11-20

## 2020-11-20 DIAGNOSIS — Z20.822 EXPOSURE TO COVID-19 VIRUS: ICD-10-CM

## 2020-11-23 ENCOUNTER — AMBULATORY - HEALTHEAST (OUTPATIENT)
Dept: LAB | Facility: CLINIC | Age: 60
End: 2020-11-23

## 2020-11-23 ENCOUNTER — COMMUNICATION - HEALTHEAST (OUTPATIENT)
Dept: INTERNAL MEDICINE | Facility: CLINIC | Age: 60
End: 2020-11-23

## 2020-11-23 DIAGNOSIS — Z78.0 MENOPAUSE: ICD-10-CM

## 2020-11-23 DIAGNOSIS — Z20.822 EXPOSURE TO COVID-19 VIRUS: ICD-10-CM

## 2020-11-25 ENCOUNTER — COMMUNICATION - HEALTHEAST (OUTPATIENT)
Dept: SCHEDULING | Facility: CLINIC | Age: 60
End: 2020-11-25

## 2020-12-07 ENCOUNTER — RECORDS - HEALTHEAST (OUTPATIENT)
Dept: ADMINISTRATIVE | Facility: OTHER | Age: 60
End: 2020-12-07

## 2020-12-14 ENCOUNTER — COMMUNICATION - HEALTHEAST (OUTPATIENT)
Dept: INTERNAL MEDICINE | Facility: CLINIC | Age: 60
End: 2020-12-14

## 2020-12-14 DIAGNOSIS — F19.982 INSOMNIA DUE TO DRUG (H): ICD-10-CM

## 2020-12-15 RX ORDER — TRAZODONE HYDROCHLORIDE 100 MG/1
TABLET ORAL
Qty: 360 TABLET | Refills: 1 | Status: SHIPPED | OUTPATIENT
Start: 2020-12-15 | End: 2021-09-03

## 2020-12-22 ENCOUNTER — RECORDS - HEALTHEAST (OUTPATIENT)
Dept: ADMINISTRATIVE | Facility: OTHER | Age: 60
End: 2020-12-22

## 2021-02-10 ENCOUNTER — COMMUNICATION - HEALTHEAST (OUTPATIENT)
Dept: INTERNAL MEDICINE | Facility: CLINIC | Age: 61
End: 2021-02-10

## 2021-02-10 DIAGNOSIS — E87.6 HYPOKALEMIA: ICD-10-CM

## 2021-02-10 RX ORDER — POTASSIUM CHLORIDE 1500 MG/1
20 TABLET, EXTENDED RELEASE ORAL DAILY
Qty: 90 TABLET | Refills: 3 | Status: SHIPPED | OUTPATIENT
Start: 2021-02-10 | End: 2022-02-25

## 2021-02-26 ENCOUNTER — COMMUNICATION - HEALTHEAST (OUTPATIENT)
Dept: INTERNAL MEDICINE | Facility: CLINIC | Age: 61
End: 2021-02-26

## 2021-02-26 ENCOUNTER — OFFICE VISIT - HEALTHEAST (OUTPATIENT)
Dept: INTERNAL MEDICINE | Facility: CLINIC | Age: 61
End: 2021-02-26

## 2021-02-26 DIAGNOSIS — J84.89 NSIP (NONSPECIFIC INTERSTITIAL PNEUMONIA) (H): ICD-10-CM

## 2021-02-26 DIAGNOSIS — M35.00 SICCA SYNDROME (H): ICD-10-CM

## 2021-02-26 DIAGNOSIS — M81.0 OSTEOPOROSIS: ICD-10-CM

## 2021-02-26 DIAGNOSIS — N95.2 ATROPHIC VAGINITIS: ICD-10-CM

## 2021-02-26 DIAGNOSIS — Z92.29 PERSONAL HISTORY OF OTHER DRUG THERAPY: ICD-10-CM

## 2021-02-26 DIAGNOSIS — Z00.00 ENCOUNTER FOR PREVENTIVE CARE: ICD-10-CM

## 2021-02-26 DIAGNOSIS — Z78.0 MENOPAUSE: ICD-10-CM

## 2021-02-26 DIAGNOSIS — D69.1 PLATELET DISORDER (H): ICD-10-CM

## 2021-02-26 LAB
ALBUMIN SERPL-MCNC: 4.6 G/DL (ref 3.5–5)
ALP SERPL-CCNC: 55 U/L (ref 45–120)
ALT SERPL W P-5'-P-CCNC: 34 U/L (ref 0–45)
ANION GAP SERPL CALCULATED.3IONS-SCNC: 11 MMOL/L (ref 5–18)
AST SERPL W P-5'-P-CCNC: 44 U/L (ref 0–40)
BILIRUB SERPL-MCNC: 0.6 MG/DL (ref 0–1)
BUN SERPL-MCNC: 14 MG/DL (ref 8–22)
CALCIUM SERPL-MCNC: 9.7 MG/DL (ref 8.5–10.5)
CHLORIDE BLD-SCNC: 100 MMOL/L (ref 98–107)
CHOLEST SERPL-MCNC: 181 MG/DL
CO2 SERPL-SCNC: 30 MMOL/L (ref 22–31)
CREAT SERPL-MCNC: 0.92 MG/DL (ref 0.6–1.1)
ERYTHROCYTE [DISTWIDTH] IN BLOOD BY AUTOMATED COUNT: 12.7 % (ref 11–14.5)
FASTING STATUS PATIENT QL REPORTED: NORMAL
GFR SERPL CREATININE-BSD FRML MDRD: >60 ML/MIN/1.73M2
GLUCOSE BLD-MCNC: 90 MG/DL (ref 70–125)
HCT VFR BLD AUTO: 40.3 % (ref 35–47)
HDLC SERPL-MCNC: 78 MG/DL
HGB BLD-MCNC: 13.2 G/DL (ref 12–16)
HIV 1+2 AB+HIV1 P24 AG SERPL QL IA: NEGATIVE
LDLC SERPL CALC-MCNC: 90 MG/DL
MCH RBC QN AUTO: 30.5 PG (ref 27–34)
MCHC RBC AUTO-ENTMCNC: 32.8 G/DL (ref 32–36)
MCV RBC AUTO: 93 FL (ref 80–100)
PLATELET # BLD AUTO: 124 THOU/UL (ref 140–440)
PMV BLD AUTO: 9.4 FL (ref 7–10)
POTASSIUM BLD-SCNC: 3.7 MMOL/L (ref 3.5–5)
PROT SERPL-MCNC: 7 G/DL (ref 6–8)
RBC # BLD AUTO: 4.33 MILL/UL (ref 3.8–5.4)
SODIUM SERPL-SCNC: 141 MMOL/L (ref 136–145)
TRIGL SERPL-MCNC: 64 MG/DL
WBC: 5.2 THOU/UL (ref 4–11)

## 2021-02-26 RX ORDER — ESTRADIOL AND NORETHINDRONE ACETATE .5; .1 MG/1; MG/1
TABLET ORAL
Qty: 90 TABLET | Refills: 3 | Status: SHIPPED | OUTPATIENT
Start: 2021-02-26 | End: 2021-09-03

## 2021-02-26 ASSESSMENT — MIFFLIN-ST. JEOR: SCORE: 1141.09

## 2021-03-01 ENCOUNTER — COMMUNICATION - HEALTHEAST (OUTPATIENT)
Dept: INTERNAL MEDICINE | Facility: CLINIC | Age: 61
End: 2021-03-01

## 2021-03-01 ENCOUNTER — AMBULATORY - HEALTHEAST (OUTPATIENT)
Dept: INTERNAL MEDICINE | Facility: CLINIC | Age: 61
End: 2021-03-01

## 2021-03-01 DIAGNOSIS — M81.8 OTHER OSTEOPOROSIS WITHOUT CURRENT PATHOLOGICAL FRACTURE: ICD-10-CM

## 2021-03-01 LAB
25(OH)D3 SERPL-MCNC: 85.1 NG/ML (ref 30–80)
25(OH)D3 SERPL-MCNC: 85.1 NG/ML (ref 30–80)
HCV AB SERPL QL IA: NEGATIVE
HPV SOURCE: NORMAL
HUMAN PAPILLOMA VIRUS 16 DNA: NEGATIVE
HUMAN PAPILLOMA VIRUS 18 DNA: NEGATIVE
HUMAN PAPILLOMA VIRUS FINAL DIAGNOSIS: NORMAL
HUMAN PAPILLOMA VIRUS OTHER HR: NEGATIVE
SPECIMEN DESCRIPTION: NORMAL

## 2021-03-03 ENCOUNTER — AMBULATORY - HEALTHEAST (OUTPATIENT)
Dept: NURSING | Facility: CLINIC | Age: 61
End: 2021-03-03

## 2021-03-08 ENCOUNTER — RECORDS - HEALTHEAST (OUTPATIENT)
Dept: ADMINISTRATIVE | Facility: OTHER | Age: 61
End: 2021-03-08

## 2021-03-08 ENCOUNTER — RECORDS - HEALTHEAST (OUTPATIENT)
Dept: BONE DENSITY | Facility: CLINIC | Age: 61
End: 2021-03-08

## 2021-03-08 DIAGNOSIS — M81.0 AGE-RELATED OSTEOPOROSIS WITHOUT CURRENT PATHOLOGICAL FRACTURE: ICD-10-CM

## 2021-03-08 LAB
BKR LAB AP ABNORMAL BLEEDING: NO
BKR LAB AP BIRTH CONTROL/HORMONES: NORMAL
BKR LAB AP CERVICAL APPEARANCE: NORMAL
BKR LAB AP GYN ADEQUACY: NORMAL
BKR LAB AP GYN INTERPRETATION: NORMAL
BKR LAB AP HPV REFLEX: NORMAL
BKR LAB AP LMP: NORMAL
BKR LAB AP PATIENT STATUS: NORMAL
BKR LAB AP PREVIOUS ABNORMAL: NO
BKR LAB AP PREVIOUS NORMAL: 2017
HIGH RISK?: NO
PATH REPORT.COMMENTS IMP SPEC: NORMAL
RESULT FLAG (HE HISTORICAL CONVERSION): NORMAL

## 2021-03-15 ENCOUNTER — RECORDS - HEALTHEAST (OUTPATIENT)
Dept: ADMINISTRATIVE | Facility: OTHER | Age: 61
End: 2021-03-15

## 2021-03-15 ENCOUNTER — COMMUNICATION - HEALTHEAST (OUTPATIENT)
Dept: INTERNAL MEDICINE | Facility: CLINIC | Age: 61
End: 2021-03-15

## 2021-03-15 DIAGNOSIS — G47.00 INSOMNIA: ICD-10-CM

## 2021-03-15 LAB
ALT SERPL W/O P-5'-P-CCNC: 28 IU/L (ref 5–35)
AST SERPL-CCNC: 41 U/L (ref 5–34)
CREAT SERPL-MCNC: 0.8 MG/DL (ref 0.5–1.3)

## 2021-03-15 RX ORDER — QUETIAPINE FUMARATE 100 MG/1
TABLET, FILM COATED ORAL
Qty: 180 TABLET | Refills: 3 | Status: SHIPPED | OUTPATIENT
Start: 2021-03-15 | End: 2021-12-15

## 2021-04-06 ENCOUNTER — COMMUNICATION - HEALTHEAST (OUTPATIENT)
Dept: INTERNAL MEDICINE | Facility: CLINIC | Age: 61
End: 2021-04-06

## 2021-04-06 DIAGNOSIS — N95.2 ATROPHIC VAGINITIS: ICD-10-CM

## 2021-04-07 ENCOUNTER — COMMUNICATION - HEALTHEAST (OUTPATIENT)
Dept: INTERNAL MEDICINE | Facility: CLINIC | Age: 61
End: 2021-04-07

## 2021-04-08 ENCOUNTER — COMMUNICATION - HEALTHEAST (OUTPATIENT)
Dept: INTERNAL MEDICINE | Facility: CLINIC | Age: 61
End: 2021-04-08

## 2021-04-08 DIAGNOSIS — N95.2 ATROPHIC VAGINITIS: ICD-10-CM

## 2021-04-12 ENCOUNTER — RECORDS - HEALTHEAST (OUTPATIENT)
Dept: HEALTH INFORMATION MANAGEMENT | Facility: CLINIC | Age: 61
End: 2021-04-12

## 2021-04-19 ENCOUNTER — COMMUNICATION - HEALTHEAST (OUTPATIENT)
Dept: INTERNAL MEDICINE | Facility: CLINIC | Age: 61
End: 2021-04-19

## 2021-04-19 DIAGNOSIS — I10 BENIGN ESSENTIAL HYPERTENSION: ICD-10-CM

## 2021-04-20 RX ORDER — SPIRONOLACTONE AND HYDROCHLOROTHIAZIDE 25; 25 MG/1; MG/1
TABLET ORAL
Qty: 135 TABLET | Refills: 11 | Status: SHIPPED | OUTPATIENT
Start: 2021-04-20 | End: 2022-09-07

## 2021-05-05 ENCOUNTER — AMBULATORY - HEALTHEAST (OUTPATIENT)
Dept: LAB | Facility: CLINIC | Age: 61
End: 2021-05-05

## 2021-05-05 DIAGNOSIS — M81.8 OTHER OSTEOPOROSIS WITHOUT CURRENT PATHOLOGICAL FRACTURE: ICD-10-CM

## 2021-05-06 LAB
25(OH)D3 SERPL-MCNC: 51.5 NG/ML (ref 30–80)
25(OH)D3 SERPL-MCNC: 51.5 NG/ML (ref 30–80)

## 2021-05-25 ENCOUNTER — RECORDS - HEALTHEAST (OUTPATIENT)
Dept: ADMINISTRATIVE | Facility: CLINIC | Age: 61
End: 2021-05-25

## 2021-05-27 NOTE — TELEPHONE ENCOUNTER
RN cannot approve Refill Request    RN can NOT refill this medication med is not covered by policy/route to provider. Last office visit: Visit date not found Last Physical: 12/12/2018 Last MTM visit: Visit date not found Last visit same specialty: 6/12/2018 Agnieszka Walter MD.  Next visit within 3 mo: Visit date not found  Next physical within 3 mo: Visit date not found      Jessica Liu, [facility] Triage/Med Refill 4/6/2019    Requested Prescriptions   Pending Prescriptions Disp Refills     QUEtiapine (SEROQUEL) 100 MG tablet [Pharmacy Med Name: QUETIAPINE   TAB] 180 tablet 1     Sig: TAKE 2 TABLETS 200 MG TOTAL BY MOUTH AT BEDTIME    There is no refill protocol information for this order

## 2021-05-28 NOTE — TELEPHONE ENCOUNTER
Refill Approved    Rx renewed per Medication Renewal Policy. Medication was last renewed on 8/7/18.    Anais Rhodes, [facility] Triage/Med Refill 5/15/2019     Requested Prescriptions   Pending Prescriptions Disp Refills     atorvastatin (LIPITOR) 10 MG tablet [Pharmacy Med Name: ATORVASTATIN 10 MG TABLET** 10 TAB] 90 tablet 2     Sig: TAKE 1 TABLET (10 MG TOTAL) BY MOUTH AT BEDTIME.       Statins Refill Protocol (Hmg CoA Reductase Inhibitors) Passed - 5/15/2019  9:19 AM        Passed - PCP or prescribing provider visit in past 12 months      Last office visit with prescriber/PCP: 6/12/2018 Agnieszka Walter MD OR same dept: 6/12/2018 Agnieszka Walter MD OR same specialty: 6/12/2018 Agnieszka Walter MD  Last physical: 12/15/2017 Last MTM visit: Visit date not found   Next visit within 3 mo: Visit date not found  Next physical within 3 mo: Visit date not found  Prescriber OR PCP: Agnieszka Walter MD  Last diagnosis associated with med order: 1. Hyperlipidemia  - atorvastatin (LIPITOR) 10 MG tablet [Pharmacy Med Name: ATORVASTATIN 10 MG TABLET** 10 TAB]; TAKE 1 TABLET (10 MG TOTAL) BY MOUTH AT BEDTIME.  Dispense: 90 tablet; Refill: 2    If protocol passes may refill for 12 months if within 3 months of last provider visit (or a total of 15 months).

## 2021-05-29 NOTE — PROGRESS NOTES
Replaced by [facility] Follow Up Note-routine medical problems    Assessment/Plan:  1. Sjogren's Syndrome  Being followed by both pulmonary and rheumatology.  Imuran dose has been reduced.  Medication list updated.  Recommendation: Per rheumatology    2.  Osteoporosis-on Prolia  No concerns-continue the same    3. Hypokalemia  Previous BMPs have shown lower potassium levels.  Will reassess    4. Hyperlipidemia LDL goal <130 with low coronary calcium score  We will assess lipids today.  Will remain on statin due to presence of an inflammatory condition  - Lipid Cascade FASTING    5. Medication monitoring encounter  Labs reviewed from rheumatology      Follow-up for physical in the winter    Symone Arrooy MD    Chief Complaint:  Chief Complaint   Patient presents with     Follow-up     Medication Management       History of Present Illness:  Radha is a 59 y.o. female who is here today for follow-up of her usual medical problems.  Of note, overall she is doing well.  Of note, they are weaning her Imuran.  She states that she is noticing some slight increase in pulmonary congestion.  She has just been evaluated this week by both her pulmonologist and rheumatologist.  Her pulmonologist was satisfied with her pulmonary function tests, despite rales on examination.  Her energy level remains good.  She denies any chest pain or shortness of breath.    She has no other concerns.  She has regular routine labs ordered by rheumatology.  She has had hypokalemia here.  She is attempting to eat foods rich in potassium.    With regard to health maintenance, she sees Caitlin Joaquin that [facility] and her Pap smear is up-to-date.  Pneumonia vaccine is up-to-date    Review of Systems:  A comprehensive review of systems was performed and was otherwise negative    PFSH:  Social History: She is a musician-organist.  Her entire family is musically gifted.  Social History     Tobacco Use   Smoking Status Never Smoker  "  Smokeless Tobacco Never Used       Past History:   Past Medical History:   Diagnosis Date     Cervicalgia      Idiopathic thrombocytopenic purpura (H)      Interstitial lung disease (H)      Interstitial pulmonary disease (H)      Lumbar radiculopathy      Osteopenia      Raynaud phenomenon      Right shoulder pain      Sjoegren syndrome (H)      Vasomotor rhinitis        Current Outpatient Medications   Medication Sig Dispense Refill     atorvastatin (LIPITOR) 10 MG tablet TAKE 1 TABLET (10 MG TOTAL) BY MOUTH AT BEDTIME. 90 tablet 1     azaTHIOprine (IMURAN) 50 mg tablet Take 50 mg by mouth daily.              calcium carbonate (OS-KOLE) 600 mg calcium (1,500 mg) tablet Take 600 mg by mouth 2 (two) times a day with meals.       cetirizine (ZYRTEC) 10 MG tablet Take 10 mg by mouth daily.       cyclobenzaprine (FLEXERIL) 10 MG tablet Take 1 tablet (10 mg total) by mouth daily as needed for muscle spasms. 90 tablet 5     estradiol-norethindrone acet 0.5-0.1 mg per tablet        MULTIVITAMIN ORAL        QUEtiapine (SEROQUEL) 100 MG tablet TAKE 2 TABLETS 200 MG TOTAL BY MOUTH AT BEDTIME 180 tablet 1     spironolactone-hydrochlorothiazide (ALDACTAZIDE) 25-25 mg tablet Take 1 tablet by mouth daily.       traZODone (DESYREL) 100 MG tablet Take 3-4 tablets (300-400 mg total) by mouth at bedtime. 360 tablet 3     tretinoin, emollient, (RENOVA) 0.02 % Crea Apply topically daily. Apply sparingly to affected areas       No current facility-administered medications for this visit.        Family History:     Physical Exam:  General Appearance:   She is pleasant, well-appearing and in no acute distress  Vitals:    06/11/19 0838   BP: 104/68   Patient Site: Left Arm   Patient Position: Sitting   Cuff Size: Adult Regular   Pulse: 66   SpO2: 99%   Weight: 118 lb 12.8 oz (53.9 kg)   Height: 5' 6\" (1.676 m)     Wt Readings from Last 3 Encounters:   06/11/19 118 lb 12.8 oz (53.9 kg)   12/12/18 116 lb 9.6 oz (52.9 kg)   07/09/18 119 lb " (54 kg)     Body mass index is 19.17 kg/m .    Head neck exam is negative  Lungs are examined.  Breath sounds are slightly coarse.  Rales are increased towards the base of the lungs bilaterally with right greater than left  Cardiac exam reveals regular rate and rhythm with no murmurs or gallops    Data Review:    Analysis and Summary of Old Records (2): Reviewed rheumatology notes    Records Requested (1):       Other History Summarized (from other people in the room) (2):     Radiology Tests Summarized (XRAY/CT/MRI/DXA) (1): Reviewed PFTs    Labs Reviewed (1): Her labs    Medicine Tests Reviewed (EKG/ECHO/COLONOSCOPY/EGD) (1):     Independent Review of EKG or X-RAY (2):

## 2021-05-30 VITALS — BODY MASS INDEX: 18.63 KG/M2 | HEIGHT: 66 IN | WEIGHT: 115.9 LBS

## 2021-05-30 VITALS — BODY MASS INDEX: 19.04 KG/M2 | WEIGHT: 118.5 LBS | HEIGHT: 66 IN

## 2021-05-30 NOTE — TELEPHONE ENCOUNTER
Medication Request  Medication name: Aldactazide 25-25 mg, one tablet daily  Pharmacy Name and Location: [facility]  Reason for request: current medication   When did you use medication last?:  today  Patient offered appointment:  patient declined  Okay to leave a detailed message: yes    Patient is leaving town tomorrow at 11:30 am to catch a flight to [place].    Please call patient with status of her request.

## 2021-05-31 VITALS — WEIGHT: 117.9 LBS | HEIGHT: 66 IN | BODY MASS INDEX: 18.95 KG/M2

## 2021-05-31 VITALS — WEIGHT: 119.8 LBS | HEIGHT: 67 IN | BODY MASS INDEX: 18.8 KG/M2

## 2021-05-31 NOTE — PROGRESS NOTES
The following steps were completed to comply with the REMS program for Prolia:  1. Ordering provider has previously reviewed information in the Medication Guide and Patient Counseling Chart, including the serious risks of Prolia  and the symptoms of each risk and have been advised to seek prompt medical attention if they have signs or symptoms of any of the serious risks.  2. Provided each patient a copy of the Medication Guide and Patient Brochure.  See MAR for administration details.   Indication: Prolia  (denosumab) is a prescription medicine used to treat osteoporosis in patients who:   Are at high risk for fracture, meaning patients who have had a fracture related to osteoporosis, or who have multiple risk factors for fracture; Cannot use another osteoporosis medicine or other osteoporosis medicines did not work well.   The timeline for early/late injections would be 4 weeks early and any time after the 6 month bettye. If a patient receives their injection late, then the subsequent injection would be 6 months from the date that they actually received the injection    Have the screening questions been asked prior to this administration? Yes

## 2021-05-31 NOTE — TELEPHONE ENCOUNTER
"Prolia Injection Phone Screen      Screening questions have been asked 2-3 days prior to administration visit for Prolia. If any questions are answered with \"Yes,\" this phone encounter were will routed to ordering provider for further evaluation.     1.  When was the last injection?  1/31/19    2.  Has insurance for this injection been verified?  Yes    3.  Did you experience any new onset achiness or rashes that lasted for over a month with your previous Prolia injection?   No    4.  Do you have a fever over 101?F or a new deep cough that is unusual for you today? No    5.  Have you started any new medications in the last 6 months that you were told could affect your immune system? These may have been prescribed by oncologist, transplant, rheumatology, or dermatology.   No    6.  In the last 6 months have you have gastric bypass or parathyroid surgery?   No    7.  Do you plan dental work requiring drilling into the bone such as implants/extractions or oral surgery in the next 2-3 months?   No    8. Do you have new insurance since the last injection?  NO  Patient informed if symptoms discussed above present prior to their administration appointment, they are to notify clinic immediately.     Vickie Ledezma            "

## 2021-06-01 ENCOUNTER — RECORDS - HEALTHEAST (OUTPATIENT)
Dept: ADMINISTRATIVE | Facility: CLINIC | Age: 61
End: 2021-06-01

## 2021-06-01 VITALS — WEIGHT: 119 LBS | BODY MASS INDEX: 18.68 KG/M2 | HEIGHT: 67 IN

## 2021-06-01 VITALS — WEIGHT: 118.5 LBS | BODY MASS INDEX: 18.84 KG/M2

## 2021-06-01 NOTE — TELEPHONE ENCOUNTER
RN cannot approve Refill Request    RN can NOT refill this medication med is not covered by policy/route to provider. Last office visit: 6/11/2019 Symone Arroyo MD Last Physical: 12/12/2018 Last MTM visit: Visit date not found Last visit same specialty: 6/11/2019 Symone Arroyo MD.  Next visit within 3 mo: Visit date not found  Next physical within 3 mo: Visit date not found      Symone Gill, [facility] Triage/Med Refill 9/27/2019    Requested Prescriptions   Pending Prescriptions Disp Refills     QUEtiapine (SEROQUEL) 100 MG tablet [Pharmacy Med Name: QUETIAPINE   TAB] 180 tablet 1     Sig: TAKE 2 TABLETS (200 MG TOTAL) BY MOUTH AT BEDTIME       There is no refill protocol information for this order

## 2021-06-02 VITALS — HEIGHT: 66 IN | WEIGHT: 116.6 LBS | BODY MASS INDEX: 18.74 KG/M2

## 2021-06-02 NOTE — PROGRESS NOTES
Advised by health insurance to visit  Has interstitial lung disease.   Coughing for 9 days.   Productive.  Hard to get up.   Green.  No real shortness of breath.  Way worse morning and evening.   Can sleep all night.   Productive in am       intermittent mucinex       positive serologies for inflammatory dx    On imuran until recently thus relative immunosuppressed.    OBJECTIVE:   Vitals:    10/31/19 1452   BP: 122/70   Pulse: 96   Resp: 16   Temp: 98.7  F (37.1  C)    thin  Thin thorax  No resp distress  Wt is noted.  No diaphoresis  Eyes: nl eom, anicteric   External ears, nose: nl  tms  Neck: nl nodes, supple, thyroid normal   Lungs: short respirations.  No rales or wheezes or prolongation of exhalation    No rhonchi   Heart: regular rhythm  Abd: soft nontender     No cva (renal) tenderness  Neuro: no weakness  Skin no rash  Joints: uninflamed   No ketotic breath odor noted  Mental: euthymic  Ext: nontender calves   Gait: normal    Body mass index is 19.37 kg/m .     ASSESSMENT/PLAN:    Additional diagnoses and related orders:  1. Viral syndrome     no clear indication for rx other than sx rx    Anticipate resolution otherwise return.  Return sooner if symptoms worsen.  More than 10 of fifteen total minutes time spent education counseling regarding the issues and care of same as listed in the assessment and plan of this note

## 2021-06-03 ENCOUNTER — COMMUNICATION - HEALTHEAST (OUTPATIENT)
Dept: INTERNAL MEDICINE | Facility: CLINIC | Age: 61
End: 2021-06-03

## 2021-06-03 VITALS
SYSTOLIC BLOOD PRESSURE: 122 MMHG | WEIGHT: 120 LBS | BODY MASS INDEX: 19.29 KG/M2 | HEART RATE: 96 BPM | DIASTOLIC BLOOD PRESSURE: 70 MMHG | HEIGHT: 66 IN | RESPIRATION RATE: 16 BRPM | TEMPERATURE: 98.7 F

## 2021-06-03 VITALS
HEART RATE: 88 BPM | BODY MASS INDEX: 19.53 KG/M2 | WEIGHT: 121 LBS | SYSTOLIC BLOOD PRESSURE: 124 MMHG | OXYGEN SATURATION: 99 % | DIASTOLIC BLOOD PRESSURE: 68 MMHG

## 2021-06-03 VITALS — BODY MASS INDEX: 19.09 KG/M2 | WEIGHT: 118.8 LBS | HEIGHT: 66 IN

## 2021-06-03 DIAGNOSIS — N95.2 ATROPHIC VAGINITIS: ICD-10-CM

## 2021-06-03 NOTE — TELEPHONE ENCOUNTER
Patient called back states she left clinic without her injection and is on her way back. message below was relayed. Patient will be in clinic in 5 minutes.

## 2021-06-03 NOTE — TELEPHONE ENCOUNTER
LMTCB. Please relay pcp message to pt.  Clinic is open until 5pm, can give her shot if pt would like to come back before the weekend.

## 2021-06-03 NOTE — TELEPHONE ENCOUNTER
Refill Approved    Rx renewed per Medication Renewal Policy. Medication was last renewed on 5/15/19.    Anais Rhodes, [facility] Triage/Med Refill 11/17/2019     Requested Prescriptions   Pending Prescriptions Disp Refills     atorvastatin (LIPITOR) 10 MG tablet [Pharmacy Med Name: ATORVASTATIN 10 MG TABLET** 10 TAB] 90 tablet 1     Sig: TAKE 1 TABLET (10 MG TOTAL) BY MOUTH AT BEDTIME.       Statins Refill Protocol (Hmg CoA Reductase Inhibitors) Passed - 11/16/2019  9:30 AM        Passed - PCP or prescribing provider visit in past 12 months      Last office visit with prescriber/PCP: 11/8/2019 Symone Arroyo MD OR same dept: 11/8/2019 Symone Arroyo MD OR same specialty: 11/8/2019 Symone Arroyo MD  Last physical: 12/12/2018 Last MTM visit: Visit date not found   Next visit within 3 mo: Visit date not found  Next physical within 3 mo: Visit date not found  Prescriber OR PCP: Symone Arroyo MD  Last diagnosis associated with med order: 1. Hyperlipidemia  - atorvastatin (LIPITOR) 10 MG tablet [Pharmacy Med Name: ATORVASTATIN 10 MG TABLET** 10 TAB]; TAKE 1 TABLET (10 MG TOTAL) BY MOUTH AT BEDTIME.  Dispense: 90 tablet; Refill: 1    If protocol passes may refill for 12 months if within 3 months of last provider visit (or a total of 15 months).                         
unsteady

## 2021-06-03 NOTE — TELEPHONE ENCOUNTER
Refill Approved    Rx renewed per Medication Renewal Policy. Medication was last renewed on 12/20/18.    Anais Rhodes, [facility] Triage/Med Refill 11/19/2019     Requested Prescriptions   Pending Prescriptions Disp Refills     traZODone (DESYREL) 100 MG tablet [Pharmacy Med Name: TRAZODONE  MG TABS** 100 TAB] 360 tablet 3     Sig: TAKE 3 TO 4 TABLETS (300  MG TOTAL) BY MOUTH AT BEDTIME.       Tricyclics/Misc Antidepressant/Antianxiety Meds Refill Protocol Passed - 11/18/2019  9:47 AM        Passed - PCP or prescribing provider visit in last year     Last office visit with prescriber/PCP: 11/8/2019 Symone Arroyo MD OR same dept: 11/8/2019 Symone Arroyo MD OR same specialty: 11/8/2019 Symone Arroyo MD  Last physical: 12/12/2018 Last MTM visit: Visit date not found   Next visit within 3 mo: Visit date not found  Next physical within 3 mo: Visit date not found  Prescriber OR PCP: Symone Arroyo MD  Last diagnosis associated with med order: 1. 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  Dispense: 360 tablet; Refill: 3    If protocol passes may refill for 12 months if within 3 months of last provider visit (or a total of 15 months).

## 2021-06-03 NOTE — PROGRESS NOTES
[facility] Follow Up Note    Assessment/Plan:  1. Rib pain  Severe rib pain following coughing episode in a patient with osteoporosis and interstitial lung disease.  Viral bronchitis/pneumonia resolving.  Now with focal rib pain.  Either ligamentous tear or subacute fracture.  Recommendation: Splint chest wall with deep breathing.  Recommend deep breathing once an hour.  Limited dose of Vicodin for severe pain provided.  Have also given limits for Tylenol.  Have offered Toradol injection.  - XR Ribs Bilateral W PA Chest; Future-personally reviewed.  Ribs look demineralized.  No overt evidence of fracture.  -  - HYDROcodone-acetaminophen 5-325 mg per tablet; Take 1 tablet by mouth every 4 (four) hours as needed for pain.  Dispense: 15 tablet; Refill: 0  - ketorolac injection 30 mg (TORADOL)    3. NSIP (nonspecific interstitial pneumonia) (H)  She is following with pulmonary in the next couple of months.  X-ray shows intensification of the interstitial infiltrate.  However clinically, she is improving.          Symone Arroyo MD    Chief Complaint:  Chief Complaint   Patient presents with     Flank Pain       History of Present Illness:  Radha is a 59 y.o. female who is seen here today for evaluation of right rib pain along the mid axillary line.  Of note, she has been dealing with a viral respiratory infection for the past 10 days.  While she has recovered from the cough, drippy nose and infectious aspect of her malady, she has developed a right rib pain.  She states with a cough, she heard a cracking noise.  Since that time, she has had fairly significant pain that is focal in the chest wall.  She denies any shortness of breath or fever.  She continues to participate in her regular activities.    She does report that she is supposed to have pulmonary function tests and a follow-up with her pulmonologist toward the end of December.  She is hoping that she can participate in this with the current  pain she is experiencing.    Review of Systems:  A comprehensive review of systems was performed and was otherwise negative    PFSH:  Social History:   Social History     Socioeconomic History     Marital status:      Spouse name: Not on file     Number of children: Not on file     Years of education: Not on file     Highest education level: Not on file   Occupational History     Not on file   Social Needs     Financial resource strain: Not on file     Food insecurity:     Worry: Not on file     Inability: Not on file     Transportation needs:     Medical: Not on file     Non-medical: Not on file   Tobacco Use     Smoking status: Never Smoker     Smokeless tobacco: Never Used   Substance and Sexual Activity     Alcohol use: Yes     Comment: occasional     Drug use: No     Sexual activity: Not on file   Lifestyle     Physical activity:     Days per week: Not on file     Minutes per session: Not on file     Stress: Not on file   Relationships     Social connections:     Talks on phone: Not on file     Gets together: Not on file     Attends Buddhism service: Not on file     Active member of club or organization: Not on file     Attends meetings of clubs or organizations: Not on file     Relationship status: Not on file     Intimate partner violence:     Fear of current or ex partner: Not on file     Emotionally abused: Not on file     Physically abused: Not on file     Forced sexual activity: Not on file   Other Topics Concern     Not on file   Social History Narrative    She is  and they have 3 children. She works part-time as a .  Her  works for [employer] as a .  She does not smoke cigarettes and occasionally drinks alcohol, and tries to exercise regularly.       Social History     Tobacco Use   Smoking Status Never Smoker   Smokeless Tobacco Never Used       Past History:   Past Medical History:   Diagnosis Date     Cervicalgia      Idiopathic thrombocytopenic purpura (H)       Interstitial lung disease (H)      Interstitial pulmonary disease (H)      Lumbar radiculopathy      Osteopenia      Raynaud phenomenon      Right shoulder pain      Sjoegren syndrome      Vasomotor rhinitis        Current Outpatient Medications   Medication Sig Dispense Refill     atorvastatin (LIPITOR) 10 MG tablet TAKE 1 TABLET (10 MG TOTAL) BY MOUTH AT BEDTIME. 90 tablet 1     calcium carbonate (OS-KOLE) 600 mg calcium (1,500 mg) tablet Take 600 mg by mouth 2 (two) times a day with meals.       cetirizine (ZYRTEC) 10 MG tablet Take 10 mg by mouth daily.       cyclobenzaprine (FLEXERIL) 10 MG tablet Take 1 tablet (10 mg total) by mouth daily as needed for muscle spasms. 90 tablet 5     estradiol-norethindrone acet 0.5-0.1 mg per tablet        MULTIVITAMIN ORAL        QUEtiapine (SEROQUEL) 100 MG tablet TAKE 2 TABLETS (200 MG TOTAL) BY MOUTH AT BEDTIME 180 tablet 1     spironolactone-hydrochlorothiazide (ALDACTAZIDE) 25-25 mg tablet Take 1 tablet by mouth daily. 90 tablet 3     traZODone (DESYREL) 100 MG tablet Take 3-4 tablets (300-400 mg total) by mouth at bedtime. 360 tablet 3     tretinoin, emollient, (RENOVA) 0.02 % Crea Apply topically daily. Apply sparingly to affected areas       HYDROcodone-acetaminophen 5-325 mg per tablet Take 1 tablet by mouth every 4 (four) hours as needed for pain. 15 tablet 0     Current Facility-Administered Medications   Medication Dose Route Frequency Provider Last Rate Last Dose     denosumab 60 mg (PROLIA 60 mg/ml)  60 mg Subcutaneous Q6 Months Kandice James, PharmD   60 mg at 08/12/19 1409     ketorolac injection 30 mg (TORADOL)  30 mg Intramuscular Once Symone Arroyo MD           Family History: Noncontributory    Physical Exam:  General Appearance:   She is pleasant and well-appearing and in no acute distress  Vitals:    11/08/19 1408   BP: 124/68   Patient Site: Left Arm   Patient Position: Sitting   Cuff Size: Adult Regular   Pulse: 88   SpO2: 99%   Weight: 121  lb (54.9 kg)     Wt Readings from Last 3 Encounters:   11/08/19 121 lb (54.9 kg)   10/31/19 120 lb (54.4 kg)   06/11/19 118 lb 12.8 oz (53.9 kg)     Body mass index is 19.53 kg/m .    Neck exam is negative  Lungs reveal no adventitious sounds but breathing is shallow.  She is clearly splinting from the chest wall pain.  Chest is palpated.  There is tenderness to palpation along the anterior axillary line around the 3rd-4th rib area.  There is some tenderness to palpation anteriorly on the right as well.  Cardiac exam reveals regular rate and rhythm  Abdomen is soft and nontender

## 2021-06-03 NOTE — TELEPHONE ENCOUNTER
Per MD requested,, call patient and offered same day slot available. Pt agreed scgeduled. Pt aware to come 15 prior appt time.

## 2021-06-03 NOTE — TELEPHONE ENCOUNTER
Can we try to get hold of her ASAP, my sincerest apologies, I was going to give her a Toradol injection but forgot to place the order.  Which she like to come back this afternoon and get that before the weekend?

## 2021-06-04 VITALS
HEART RATE: 80 BPM | HEIGHT: 66 IN | WEIGHT: 120.3 LBS | DIASTOLIC BLOOD PRESSURE: 62 MMHG | BODY MASS INDEX: 19.33 KG/M2 | SYSTOLIC BLOOD PRESSURE: 94 MMHG

## 2021-06-04 RX ORDER — ESTRADIOL 0.1 MG/G
CREAM VAGINAL
Qty: 42.5 G | Refills: 1 | Status: SHIPPED | OUTPATIENT
Start: 2021-06-04 | End: 2021-11-22

## 2021-06-04 NOTE — TELEPHONE ENCOUNTER
Who is calling:  Patient  Reason for Call:  Has lost her keys, was in to [facility] yesterday morning (12/11/19) for appointment with Dr Arroyo, is wondering if she may have left her keys at the clinic. Please call.  Date of last appointment with primary care: 12/11/19  Okay to leave a detailed message: Yes

## 2021-06-04 NOTE — PROGRESS NOTES
Assessment and Plan:     1. Encounter for preventive care  Radha is up-to-date on mammography.  Colon cancer screening will be due in 2020.  Bone density will be due in 2020.  Immunizations are up-to-date.  She is exercising vigilantly.  - Basic Metabolic Panel  - Lipid Cascade FASTING    2. NSIP (nonspecific interstitial pneumonia) (H)  Follow-up with Dr. Rene in December with CT scan and PFTs.  Stable    3. Agatston coronary artery calcium score less than 100  Coronary calcium scan-2017 with score of 7.  Recheck at 5-year interval.  Continue with cholesterol-lowering regimen    4. Other osteoporosis without current pathological fracture  She is on Prolia with no difficulties.  She is engaged in weightbearing and balance exercise.  She is getting calcium and vitamin D  - Vitamin D, Total (25-Hydroxy)    5. Impacted cerumen of left ear  We will remove cerumen  - Ear wax removal    6.  Hormone replacement therapy  She has been on since 2015.  This is been beneficial for her bones.  She is approaching 5 years in 2020.  At that time, we will contemplate a slow wean i.e. taking the medication every other day and monitoring for hot flashes.  Will need careful monitoring of bone density as well.      Symone Arroyo MD  12/11/2019    Chief Complaint:  Chief Complaint   Patient presents with     Annual Exam       History of Present Illness:  Radha is a 59 y.o. female who is here today for an annual physical examination.  Of note, she was seen recently for rib pain after a cough.  She has since recovered.  She has underlying connective tissue disorder and she is followed with rheumatologist on a regular basis.  She had laboratory studies done earlier this month.  They typically include liver enzymes as well as a blood count and inflammation markers.  These have been relatively stable.    She has no chief complaints.    Health maintenance is reviewed and updated in the chart.  Pap smear is due in 2020 as well as colon  cancer screening and mammography.  Relations are up-to-date.  Ophthalmologic, dental and Derm care are up-to-date    Lifestyle reviewed.  She exercises vigilantly 5-6 times per week.  She engages in a variety of activities which include both cardio and weightbearing and balance activities.        Review of Systems:    The rest of the review of systems are negative for all systems.    PFSH:  Social History:   Social History     Socioeconomic History     Marital status:      Spouse name: Not on file     Number of children: Not on file     Years of education: Not on file     Highest education level: Not on file   Occupational History     Not on file   Social Needs     Financial resource strain: Not on file     Food insecurity:     Worry: Not on file     Inability: Not on file     Transportation needs:     Medical: Not on file     Non-medical: Not on file   Tobacco Use     Smoking status: Never Smoker     Smokeless tobacco: Never Used   Substance and Sexual Activity     Alcohol use: Yes     Comment: occasional     Drug use: No     Sexual activity: Not on file   Lifestyle     Physical activity:     Days per week: Not on file     Minutes per session: Not on file     Stress: Not on file   Relationships     Social connections:     Talks on phone: Not on file     Gets together: Not on file     Attends [employer] service: Not on file     Active member of club or organization: Not on file     Attends meetings of clubs or organizations: Not on file     Relationship status: Not on file     Intimate partner violence:     Fear of current or ex partner: Not on file     Emotionally abused: Not on file     Physically abused: Not on file     Forced sexual activity: Not on file   Other Topics Concern     Not on file   Social History Narrative    She is  and they have 3 children. She works part-time as a .  Her  works for [employer] as a .  She does not smoke cigarettes and occasionally drinks  "alcohol, and tries to exercise regularly.       Social History     Tobacco Use   Smoking Status Never Smoker   Smokeless Tobacco Never Used       Past Medical History:   Past Medical History:   Diagnosis Date     Cervicalgia      Idiopathic thrombocytopenic purpura (H)      Interstitial lung disease (H)      Interstitial pulmonary disease (H)      Lumbar radiculopathy      Osteopenia      Raynaud phenomenon      Right shoulder pain      Sjoegren syndrome      Vasomotor rhinitis        No Known Allergies    Family History: No new illnesses  Family History   Problem Relation Age of Onset     Cancer Father         Mesothelioma,  age 71     Diabetes Brother          age 43     Diabetes type I Son      Diabetes Son      Parkinsonism Mother      Osteoporosis Mother      Alcohol abuse Brother        Physical Exam:  Vitals:    19 1011   BP: 94/62   Patient Site: Left Arm   Patient Position: Sitting   Cuff Size: Adult Regular   Pulse: 80   Weight: 120 lb 4.8 oz (54.6 kg)   Height: 5' 6\" (1.676 m)     Wt Readings from Last 3 Encounters:   19 120 lb 4.8 oz (54.6 kg)   19 121 lb (54.9 kg)   10/31/19 120 lb (54.4 kg)       General Appearance:  Alert, cooperative, no distress, appears stated age   Head:  Normocephalic, without obvious abnormality, atraumatic   Eyes:  PERRL, conjunctiva/corneas clear, EOM's intact   Ears:  Normal TM's and external ear canals, both ears   Nose: Nares normal, septum midline,mucosa normal, no drainage    Throat: Lips, mucosa, and tongue normal; teeth and gums normal   Neck: Supple, symmetrical, trachea midline, no adenopathy;  thyroid: not enlarged, symmetric, no tenderness/mass/nodules; no carotid bruit or JVD   Back:   Symmetric, no curvature, ROM normal, no CVA tenderness   Lungs:   Clear to auscultation bilaterally, respirations unlabored   Heart:  Regular rate and rhythm, S1 and S2 normal, no murmur, rub, or gallop   Abdomen:   Soft, non-tender, bowel sounds active all " four quadrants,  no masses, no organomegaly, no hernias    Extremities: Extremities normal, atraumatic, no cyanosis or edema   Skin: Skin color, texture, turgor normal, no rashes or lesions   Lymph nodes: Cervical, supraclavicular, and axillary nodes normal   Neurologic: Normal, CN 2-12 intact  Breasts are examined.  No masses, nipple discharge or axillary adenopathy noted     Medications:  Current Outpatient Medications   Medication Sig Dispense Refill     atorvastatin (LIPITOR) 10 MG tablet TAKE 1 TABLET (10 MG TOTAL) BY MOUTH AT BEDTIME. 90 tablet 3     calcium carbonate (OS-KOLE) 600 mg calcium (1,500 mg) tablet Take 600 mg by mouth 2 (two) times a day with meals.       cetirizine (ZYRTEC) 10 MG tablet Take 10 mg by mouth daily.       cyclobenzaprine (FLEXERIL) 10 MG tablet Take 1 tablet (10 mg total) by mouth daily as needed for muscle spasms. 90 tablet 5     estradiol-norethindrone acet 0.5-0.1 mg per tablet        MULTIVITAMIN ORAL        QUEtiapine (SEROQUEL) 100 MG tablet TAKE 2 TABLETS (200 MG TOTAL) BY MOUTH AT BEDTIME 180 tablet 1     spironolactone-hydrochlorothiazide (ALDACTAZIDE) 25-25 mg tablet Take 1 tablet by mouth daily. 90 tablet 3     traZODone (DESYREL) 100 MG tablet TAKE 3 TO 4 TABLETS (300  MG TOTAL) BY MOUTH AT BEDTIME. 360 tablet 3     tretinoin, emollient, (RENOVA) 0.02 % Crea Apply topically daily. Apply sparingly to affected areas       Current Facility-Administered Medications   Medication Dose Route Frequency Provider Last Rate Last Dose     denosumab 60 mg (PROLIA 60 mg/ml)  60 mg Subcutaneous Q6 Months Kandice James, PharmD   60 mg at 08/12/19 2187       Immunizations:  Immunization History   Administered Date(s) Administered     Hep A, historic 11/17/2006, 05/01/2007     Influenza V8a5-18, 01/13/2010     Influenza, Seasonal, Inj PF IIV3 09/21/2010, 10/05/2011     Influenza, inj, historic,unspecified 10/29/2008, 09/14/2009, 09/15/2019     Influenza, seasonal,quad inj 6-35  mos 09/21/2012, 09/16/2013     Influenza,seasonal quad, PF 09/23/2014     Influenza,seasonal, Inj IIV3 12/04/2000, 12/03/2001, 10/29/2008, 09/14/2009, 09/21/2010, 10/05/2011, 09/21/2012, 09/16/2013, 09/23/2014, 09/17/2015, 09/13/2016, 09/20/2017, 09/16/2018     Influenza,seasonal,quad inj =/> 6months 09/17/2015, 09/13/2016, 09/20/2017     Pneumo Conj 13-V (2010&after) 12/12/2018     Pneumo Polysac 23-V 07/23/2012     Td, adult adsorbed, PF 08/30/2018     Tdap 06/25/2008     ZOSTER, RECOMBINANT, IM 07/01/2018, 10/08/2018

## 2021-06-05 VITALS
WEIGHT: 123.31 LBS | HEART RATE: 88 BPM | DIASTOLIC BLOOD PRESSURE: 70 MMHG | HEIGHT: 66 IN | OXYGEN SATURATION: 98 % | SYSTOLIC BLOOD PRESSURE: 108 MMHG | BODY MASS INDEX: 19.82 KG/M2 | RESPIRATION RATE: 18 BRPM

## 2021-06-05 NOTE — TELEPHONE ENCOUNTER
RN cannot approve Refill Request    RN can NOT refill this medication med is not covered by policy/route to provider. Last office visit: 11/8/2019 Symone Arroyo MD Last Physical: 12/11/2019 Last MTM visit: Visit date not found Last visit same specialty: 11/8/2019 Symone Arroyo MD.  Next visit within 3 mo: Visit date not found  Next physical within 3 mo: Visit date not found      Nafisa Allison, [facility] Connection Triage/Med Refill 2/4/2020    Requested Prescriptions   Pending Prescriptions Disp Refills     spironolactone-hydrochlorothiazide (ALDACTAZIDE) 25-25 mg tablet [Pharmacy Med Name: SPIRONOLACT/HCTZ 25/25 ^^ 25-25 TAB] 135 tablet 11     Sig: TAKE 1.5 TABLETS BY MOUTH DAILY.       There is no refill protocol information for this order

## 2021-06-06 NOTE — TELEPHONE ENCOUNTER
Refill Approved    Rx renewed per Medication Renewal Policy. Medication was last renewed on 12/12/19.    Jessica Liu, [facility] Connection Triage/Med Refill 3/6/2020     Requested Prescriptions   Pending Prescriptions Disp Refills     potassium chloride (K-DUR,KLOR-CON) 20 MEQ tablet [Pharmacy Med Name: POTASSIUM CHL ER 20MEQ** 20 TAB] 90 tablet 1     Sig: TAKE 1 TABLET (20 MEQ TOTAL) BY MOUTH DAILY.       Potassium Supplements Refill Protocol Passed - 3/6/2020  9:26 AM        Passed - PCP or prescribing provider visit in past 12 months       Last office visit with prescriber/PCP: 11/8/2019 Symone Arroyo MD OR same dept: 11/8/2019 Symone Arroyo MD OR same specialty: 11/8/2019 Symone Arroyo MD  Last physical: 12/11/2019 Last MTM visit: Visit date not found   Next visit within 3 mo: Visit date not found  Next physical within 3 mo: Visit date not found  Prescriber OR PCP: Symone Arroyo MD  Last diagnosis associated with med order: 1. Hypokalemia  - potassium chloride (K-DUR,KLOR-CON) 20 MEQ tablet [Pharmacy Med Name: POTASSIUM CHL ER 20MEQ** 20 TAB]; Take 1 tablet (20 mEq total) by mouth daily.  Dispense: 90 tablet; Refill: 1    If protocol passes may refill for 12 months if within 3 months of last provider visit (or a total of 15 months).             Passed - Potassium level in last 12 months     Lab Results   Component Value Date    Potassium 3.3 (L) 12/11/2019

## 2021-06-06 NOTE — TELEPHONE ENCOUNTER
LMTCB & Mychart mess sent     Please go over questions with pt or send to [facility] or back line

## 2021-06-06 NOTE — TELEPHONE ENCOUNTER
"Prolia Injection Phone Screen      Screening questions have been asked 2-3 days prior to administration visit for Prolia. If any questions are answered with \"Yes,\" this phone encounter were will routed to ordering provider for further evaluation.     1.  When was the last injection?  8/12/19    2.  Has insurance for this injection been verified?  Yes    3.  Did you experience any new onset achiness or rashes that lasted for over a month with your previous Prolia injection?       4.  Do you have a fever over 101?F or a new deep cough that is unusual for you today?     5.  Have you started any new medications in the last 6 months that you were told could affect your immune system? These may have been prescribed by oncologist, transplant, rheumatology, or dermatology.       6.  In the last 6 months have you have gastric bypass or parathyroid surgery?       7.  Do you plan dental work requiring drilling into the bone such as implants/extractions or oral surgery in the next 2-3 months?       8. Do you have new insurance since the last injection?    Patient informed if symptoms discussed above present prior to their administration appointment, they are to notify clinic immediately.     Vickie Ledezma            "

## 2021-06-08 NOTE — PROGRESS NOTES
Stable on prolia, elevated vitamin D level, had been told to back down on this a little. Normal renal fuction.

## 2021-06-08 NOTE — PROGRESS NOTES
"   [facility] Follow Up Note    Radha Morrison   56 y.o. female    Date of Visit: 1/11/2017    Chief Complaint   Patient presents with     Results     labs     Subjective  Radha comes in today to discuss her recent blood tests.  Her LDL was up upon the last check and she wonders if it could've been diet related and she is altered some things and would like to recheck it today.  She has cut out her vitamin D supplements as well due to elevated levels and just get some with her calcium supplements.  Her potassium level was also a bit low and she's been trying to eat higher potassium foods.  She's tried the quetiapine and it's only worked one son giving her a good night's sleep and she really thinks a lot of it is due to anxiety issues.  She also knows that CellCept can cause some insomnia.    ROS A comprehensive review of systems was performed and was otherwise negative    Social History:   Social History     Social History Narrative    She is  and they have 3 children. She works part-time as a .  Her  works for [employer] as a .  She does not smoke cigarettes and occasionally drinks alcohol, and tries to exercise regularly.       Medications were reconciled.  Allergies, social and family history, and the problem list were all reviewed and updated.    Old records reviewed:  Density scan is reviewed with her.    Exam  General Appearance: Pleasant and alert  Vitals:    01/11/17 0830   BP: 118/82   Pulse: 88   Resp: 14   Weight: 115 lb 14.4 oz (52.6 kg)   Height: 5' 6\" (1.676 m)      Body mass index is 18.71 kg/(m^2).  Wt Readings from Last 3 Encounters:   01/11/17 115 lb 14.4 oz (52.6 kg)   12/14/16 114 lb 3.2 oz (51.8 kg)   08/17/16 116 lb (52.6 kg)     HEENT: Sclera are clear.   Lungs: Normal respirations  Abdomen: Soft and nondistended  Extremities: No edema  Skin: No rashes  Neuro: Moves all extremities and has facial symmetry  Gait: Ambulates with a normal " gait    Assessment/Plan  1. Hyperlipidemia  We'll recheck labs today, if still elevated could consider coronary calcium score.  - Lipid Cascade  - Basic Metabolic Panel    2. Hypokalemia  Recheck labs today.  - Basic Metabolic Panel    3. Osteoporosis  She remains on Prolia and her bone density scan is stable.    4.  Insomnia  We discussed trying to increase the quetiapine med to 200-300 mg at night to see if it helps her get more restorative sleep.      Agnieszka Walter MD  1/11/2017    Much or all of the text in this note was generated through the use of Dragon Dictate voice-to-text software. Errors in spelling or words which seem out of context are unintentional. Sound alike errors, in particular, may have escaped editing.

## 2021-06-08 NOTE — PROGRESS NOTES
[facility] Follow Up Note    Assessment/Plan:    1. Sore throat  Discussed possibility of strep infection versus mononucleosis since her daughter has had this in the last month.  Rapid stress test was negative.  We'll await final culture.  We will do mononucleosis screen today and check her liver and kidney function and CBC.  If all tests are negative can repeat again testing in a week for mononucleosis.  - Rapid Strep A Screen-Throat  - Group A Strep, RNA Direct Detection, Throat  - Mononucleosis Screen  - Comprehensive Metabolic Panel  - HM1(CBC and Differential)  - HM1 (CBC with Diff)    2. Personal history of immunosupression therapy  Patient is currently on CellCept due to interstitial lung disease.  Last creatinine function was normal in January.  - Comprehensive Metabolic Panel  - HM1(CBC and Differential)  - HM1 (CBC with Diff)    Dina Arzola MD    Chief Complaint:  Chief Complaint   Patient presents with     Sore Throat     for past week       History of Present Illness:  Radha is a 56 y.o. female Ms. 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    Symptoms started out of the blue and has been his other change for a week.  She denies any fevers or chills, no painful lymphadenopathy, no cough, no sinus tenderness.  She does have chronic postnasal drainage which is unchanged.  She has been feeling more tired than usual.  Currently so throat is worse in the morning and somewhat better after she drinks fluids in the evening.  She has no difficulty swallowing.  She denies any acid reflux symptoms.  No recent sick contacts.  The daughter did have Margaret-Barr virus infection in November and was living in the house recently over the winter break.    Patient did have history of several strep  infections in the past when kids were little.  In one instance she did have fever and lymphadenopathy but in another instance she just had so throat.  She is up-to-date on flu vaccination.    Patient has interstitial lung disease and currently is immunosuppressed with CellCept.  She denies any shortness of breath or cough.    Review of Systems:  A comprehensive review of systems was performed and was otherwise negative.    PFSH:  Social History: Reviewed  History   Smoking Status     Never Smoker   Smokeless Tobacco     Not on file     Social History     Social History Narrative    She is  and they have 3 children. She works part-time as a .  Her  works for [employer] as a .  She does not smoke cigarettes and occasionally drinks alcohol, and tries to exercise regularly.       Past History: Reviewed  Current Outpatient Prescriptions   Medication Sig Dispense Refill     atorvastatin (LIPITOR) 10 MG tablet Take 1 tablet (10 mg total) by mouth bedtime. 90 tablet 5     azelastine (ASTELIN) 137 mcg nasal spray 1 spray into each nostril 3 (three) times a day as needed. Use in each nostril as directed       cholecalciferol, vitamin D3, 1,000 unit tablet Use 2,000 Units As Directed daily.        cyclobenzaprine (FLEXERIL) 10 MG tablet Take 10 mg by mouth 3 (three) times a day as needed for muscle spasms.       cyclobenzaprine (FLEXERIL) 5 MG tablet TAKE 1/2-1 TABLET (2.5-5 MG TOTAL) BY MOUTH 3 (THREE) TIMES A DAY AS NEEDED FOR MUSCLE SPASMS. 30 tablet 3     estradiol-norethindrone acet 0.5-0.1 mg per tablet        loratadine (CLARITIN) 10 mg tablet        MULTIVITAMIN ORAL        mycophenolate (CELLCEPT) 500 mg tablet Take 1,000 mg by mouth 2 (two) times a day.        QUEtiapine (SEROQUEL) 50 MG tablet Take 2-4 tablets (100-200 mg total) by mouth bedtime as needed. 180 tablet prn     spironolactone-hydrochlorothiazide (ALDACTAZIDE) 25-25 mg tablet Take 1 tablet by mouth daily. 90 tablet 5      "temazepam (RESTORIL) 22.5 MG capsule Take 22.5 mg by mouth bedtime as needed for sleep.       traZODone (DESYREL) 100 MG tablet Take 3-4 tablets (300-400 mg total) by mouth bedtime. 360 tablet 5     tretinoin, emollient, (RENOVA) 0.02 % Crea Apply topically daily. Apply sparingly to affected areas       urea (CARMOL) 40 % Crea        Current Facility-Administered Medications   Medication Dose Route Frequency Provider Last Rate Last Dose     denosumab 60 mg (PROLIA 60 mg/ml)  60 mg Subcutaneous Q6 Months Vivian Cisneros MD   60 mg at 01/20/16 1457     denosumab 60 mg (PROLIA 60 mg/ml)  60 mg Subcutaneous Q6 Months April D MD Saba   60 mg at 01/23/17 1151       Physical Exam:    Vitals:    02/06/17 1227   BP: 94/66   Patient Site: Left Arm   Patient Position: Sitting   Cuff Size: Adult Regular   Pulse: 72   Temp: 97  F (36.1  C)   TempSrc: Tympanic   SpO2: 96%   Weight: 118 lb 8 oz (53.8 kg)   Height: 5' 6\" (1.676 m)     Wt Readings from Last 3 Encounters:   02/06/17 118 lb 8 oz (53.8 kg)   01/11/17 115 lb 14.4 oz (52.6 kg)   12/14/16 114 lb 3.2 oz (51.8 kg)     Body mass index is 19.13 kg/(m^2).    Constitutional:  Reveals a pleasant female.  Vitals:  Per nursing notes.  HEENT:No cervical LAD, no thyromegaly,  conjunctiva is pink, no scleral icterus, TMs are visualized and normal bl, oropharynx is clear, tonsils without exudates  Cardiac:  Regular rate and rhythm,no murmurs, rubs, or gallops.  Legs without edema. Palpation of the radial pulse regular.  Lungs: Clear to auscultation bl.  Respiratory effort normal.  No wheezes or rales  Abdomen:positive BS, soft, nontender, nondistended.  No hepato-splenomagaly  Skin:   Without rash, bruise, or palpable lesions.  Psychiatric: affect appropriate, memory intact.     Data Review:    Analysis and Summary of Old Records (2): Yes      Records Requested (1): No      Other History Summarized (from other people in the room) (2): No    Radiology Tests Summarized " (XRAY/CT/MRI/DXA) (1): No    Labs Reviewed (1): Yes    Medicine Tests Reviewed (EKG/ECHO/COLONOSCOPY/EGD) (1): No    Independent Review of EKG or X-RAY (2): No

## 2021-06-08 NOTE — PROGRESS NOTES
After administration of Prolia 60mg subcutaneously today, the medication guide was provided to the patient. BIBIANA Cho

## 2021-06-09 NOTE — PROGRESS NOTES
"Radha Morrison is a 60 y.o. female who is being evaluated via a billable video visit.      The patient has been notified of following:     \"This video visit will be conducted via a call between you and your physician/provider. We have found that certain health care needs can be provided without the need for an in-person physical exam.  This service lets us provide the care you need with a video conversation.  If a prescription is necessary we can send it directly to your pharmacy.  If lab work is needed we can place an order for that and you can then stop by our lab to have the test done at a later time.    Video visits are billed at different rates depending on your insurance coverage. Please reach out to your insurance provider with any questions.    If during the course of the call the physician/provider feels a video visit is not appropriate, you will not be charged for this service.\"    Patient has given verbal consent to a Video visit? Yes  How would you like to obtain your AVS? AVS Preference: [employer].  Patient would like the video invitation sent by: Other e-mail: My Chart Platform  Will anyone else be joining your video visit? No        Video Start Time: 8:38 AM    Additional provider notes:   [facility] Follow Up Note    Assessment/Plan:  1. Other osteoporosis without current pathological fracture  Tolerating Prolia with no difficulties.  Vitamin D level at 50.  Will be due for an update in the fall or end of the year.  Recommendations: Continued vigilance with regard to infection susceptibility.  Continue weightbearing and balance exercise    2. Right-sided thoracic back pain  Requests renewal for Flexeril this was provided  - cyclobenzaprine (FLEXERIL) 10 MG tablet; Take 1 tablet (10 mg total) by mouth daily as needed for muscle spasms.  Dispense: 90 tablet; Refill: 5    3. Encounter for preventive care  Have discussed health maintenance.  She is due for a physical with Pap at the end of " the year.  She is also due for hep C screening.  Additionally,: Skin cancer screening is due.  We will send out Cologuard as she is considered immune compromise during the COVID crisis.  - Cologuard    4. H/O long-term (current) use of postmenopausal hormone replacement therapy  Discussion was had with regard to hormone therapy.  She is 60 and interested in weaning.  Dr. Samano has retired.  Recommendation: Would like to see her go to every other day on hormone therapy starting 1 to 2 months before her next visit.  For now, she will continue the same.  We will need to be mindful of bone health with withdrawal of hormone therapy    5. Yeast infection of the vagina  Has occasional yeast vaginitis due to immunosuppressants.  Prescription for Diflucan provided  - fluconazole (DIFLUCAN) 150 MG tablet; Use weekly for two weeks  Dispense: 2 tablet; Refill: 0    6. NSIP (nonspecific interstitial pneumonia) (H)  She is back on Imuran.  CT scan reviewed as well as rheumatology notes    7. Platelet disorder (H)  Stable          Symone Arroyo MD    Chief Complaint:  Chief Complaint   Patient presents with     Follow-up     Medication check - OB-GYN retired and would like you to take over those meds       History of Present Illness:  Radha is a 60 y.o. female who is here today for follow-up with regard to her usual medical problems.  Of note, she has interstitial lung disease of an autoimmune variety.  She is followed by rheumatology.  She had a recent CT scan that was reviewed with her.  Her illness becomes active when she is not on Imuran.  She is back on Imuran for this and states that her breathing is stabilized.  She is feeling better.    She has questions with regard to hormone therapy.  Her gynecologist is retiring and she would like to know my advice on cutting back on hormone therapy.  She is currently without hot flashes.    Health maintenance is reviewed.  She is due for a physical and colon cancer  screening    Coronavirus is discussed.  She is vigilant with social distancing.  She has interstitial lung disease.  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  She states this is working well for her.    Review of Systems:  A comprehensive review of systems was performed and was otherwise negative    PFSH:  Social History: She is .  She is a musician.  She has 2 adult children living at home.  Social History     Tobacco Use   Smoking Status Never Smoker   Smokeless Tobacco Never Used       Past History:   Current Outpatient Medications   Medication Sig Dispense Refill     atorvastatin (LIPITOR) 10 MG tablet TAKE 1 TABLET (10 MG TOTAL) BY MOUTH AT BEDTIME. 90 tablet 3     azaTHIOprine (IMURAN) 50 mg tablet Take 1 tablet by mouth 2 (two) times a day.       calcium carbonate (OS-KOLE) 600 mg calcium (1,500 mg) tablet Take 600 mg by mouth 2 (two) times a day with meals.       cetirizine (ZYRTEC) 10 MG tablet Take 10 mg by mouth daily.       estradiol-norethindrone acet 0.5-0.1 mg per tablet        fluconazole (DIFLUCAN) 150 MG tablet Take 150 mg by mouth once. Take 1 tablet and then take 1 tablet 3 days later       MULTIVITAMIN ORAL        potassium chloride (K-DUR,KLOR-CON) 20 MEQ tablet Take 1 tablet (20 mEq total) by mouth daily. 90 tablet 3     QUEtiapine (SEROQUEL) 100 MG tablet TAKE 2 TABLETS (200 MG TOTAL) BY MOUTH AT BEDTIME 180 tablet 3     spironolactone-hydrochlorothiazide (ALDACTAZIDE) 25-25 mg tablet TAKE 1.5 TABLETS BY MOUTH DAILY. 135 tablet 11     traZODone (DESYREL) 100 MG tablet TAKE 3 TO 4 TABLETS (300  MG TOTAL) BY MOUTH AT BEDTIME. 360 tablet 3     tretinoin, emollient, (RENOVA) 0.02 % Crea Apply topically daily. Apply sparingly to affected areas       VITAMIN B COMPLEX ORAL Take 1 capsule by mouth daily.       cyclobenzaprine (FLEXERIL) 10 MG tablet Take 1 tablet (10 mg total) by mouth daily as needed for muscle spasms. 90 tablet 5     fluconazole  (DIFLUCAN) 150 MG tablet Use weekly for two weeks 2 tablet 0     No current facility-administered medications for this visit.        Family History:     Physical Exam:  General Appearance:   She appears quite well and in no acute distress.  There were no vitals filed for this visit.  Wt Readings from Last 3 Encounters:   12/11/19 120 lb 4.8 oz (54.6 kg)   11/08/19 121 lb (54.9 kg)   10/31/19 120 lb (54.4 kg)     There is no height or weight on file to calculate BMI.    No shortness of breath is noted    Data Review:    Analysis and Summary of Old Records (2): Reviewed rheumatology notes    Records Requested (1):       Other History Summarized (from other people in the room) (2):     Radiology Tests Summarized (XRAY/CT/MRI/DXA) (1): Reviewed CT scan from June 1    Labs Reviewed (1): Reviewed rheumatology labs and labs from December    Medicine Tests Reviewed (EKG/ECHO/COLONOSCOPY/EGD) (1): Ordered Cologuard    Independent Review of EKG or X-RAY (2):             Video-Visit Details    Type of service:  Video Visit    Video End Time (time video stopped): 8:53 AM  Originating Location (pt. Location): Home    Distant Location (provider location):  [facility] INTER[facility] MEDIC[facility]     Platform used for Video Visit: Jasen Arroyo MD

## 2021-06-11 NOTE — PROGRESS NOTES
"[facility] Follow Up Note    Radha Morrison   57 y.o. female    Date of Visit: 6/9/2017    Chief Complaint   Patient presents with     Medication Management     Subjective  Radha comes in today to follow-up hyperlipidemia.  She had a positive coronary calcium score of 7.  Her cholesterol level went up since being on Prolia and we started her on atorvastatin and she comes in today for a recheck.  She is tolerated the medication well without side effect.  Her Sjogren's has been stable and she continues to be followed by Dr. Schwartz in for her nonspecific interstitial pneumonia.  He is on CellCept and they are talking about Rituxan in the future.    ROS A comprehensive review of systems was performed and was otherwise negative    Social History:   Social History     Social History Narrative    She is  and they have 3 children. She works part-time as a .  Her  works for [employer] as a .  She does not smoke cigarettes and occasionally drinks alcohol, and tries to exercise regularly.       Medications were reconciled.  Allergies, social and family history, and the problem list were all reviewed and updated.      Exam  General Appearance: Pleasant and alert  Vitals:    06/09/17 0845   BP: 112/80   Pulse: 80   Resp: 10   SpO2: 97%   Weight: 117 lb 14.4 oz (53.5 kg)   Height: 5' 6\" (1.676 m)      Body mass index is 19.03 kg/(m^2).  Wt Readings from Last 3 Encounters:   06/09/17 117 lb 14.4 oz (53.5 kg)   02/06/17 118 lb 8 oz (53.8 kg)   01/11/17 115 lb 14.4 oz (52.6 kg)     HEENT: Sclera are clear.   Lungs: Normal respirations  Abdomen: Soft and nondistended  Extremities: No edema  Skin: No rashes  Neuro: Moves all extremities and has facial symmetry  Gait: Ambulates with a normal gait    Assessment/Plan  1. Hyperlipidemia  Check a level today.  - Hepatic Profile  - Lipid [place]    2. Agatston coronary artery calcium score less than 100  Discussed that a score of 7 is actually " fairly good.    3. Sjogren's Syndrome  Stable and she follows with rheumatology.          Agnieszka Walter MD  6/9/2017    Much or all of the text in this note was generated through the use of Dragon Dictate voice-to-text software. Errors in spelling or words which seem out of context are unintentional. Sound alike errors, in particular, may have escaped editing.

## 2021-06-13 NOTE — TELEPHONE ENCOUNTER
Refill Approved    Rx renewed per Medication Renewal Policy. Medication was last renewed on 11/19/19.    Anais Rhodes, [facility] Triage/Med Refill 12/15/2020     Requested Prescriptions   Pending Prescriptions Disp Refills     traZODone (DESYREL) 100 MG tablet [Pharmacy Med Name: TRAZODONE 100 MG TAB** 100MG Tablet] 360 tablet 11     Sig: TAKE 3 TO 4 TABLETS (300  MG TOTAL) BY MOUTH AT BEDTIME.       Tricyclics/Misc Antidepressant/Antianxiety Meds Refill Protocol Passed - 12/14/2020  9:44 AM        Passed - PCP or prescribing provider visit in last year     Last office visit with prescriber/PCP: 11/8/2019 Symone Arroyo MD OR same dept: Visit date not found OR same specialty: 11/8/2019 Symone Arroyo MD  Last physical: 12/11/2019 Last MTM visit: Visit date not found   Next visit within 3 mo: Visit date not found  Next physical within 3 mo: Visit date not found  Prescriber OR PCP: Symone Arroyo MD  Last diagnosis associated with med order: 1. 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  Dispense: 360 tablet; Refill: 11    If protocol passes may refill for 12 months if within 3 months of last provider visit (or a total of 15 months).

## 2021-06-13 NOTE — TELEPHONE ENCOUNTER
Pt has PX1 on 2-3-2021 and asking for refill of  Hormone replacement RX until appt    Pharm:  [facility]    And please call pt when done:  [phone] [id number]

## 2021-06-13 NOTE — TELEPHONE ENCOUNTER
New Appointment Needed  What is the reason for the visit:    Physical, patient did have an appointment but is now quarentining for two weeks.  Can Dr. Arroyo fit her into her schedule in December?  Provider Preference: PCP only  How soon do you need to be seen?: 12-04-20  Waitlist offered?: No  Okay to leave a detailed message:  Yes

## 2021-06-13 NOTE — TELEPHONE ENCOUNTER
Refill Approved    Rx renewed per Medication Renewal Policy. Medication was last renewed on 11/17/19.    Anais Rhodes, [facility] Triage/Med Refill 11/12/2020     Requested Prescriptions   Pending Prescriptions Disp Refills     atorvastatin (LIPITOR) 10 MG tablet 90 tablet 3     Sig: Take 1 tablet (10 mg total) by mouth at bedtime.       Statins Refill Protocol (Hmg CoA Reductase Inhibitors) Passed - 11/9/2020  4:40 PM        Passed - PCP or prescribing provider visit in past 12 months      Last office visit with prescriber/PCP: 11/8/2019 Symone Arroyo MD OR same dept: Visit date not found OR same specialty: 11/8/2019 Symone Arroyo MD  Last physical: 12/11/2019 Last MTM visit: Visit date not found   Next visit within 3 mo: Visit date not found  Next physical within 3 mo: Visit date not found  Prescriber OR PCP: Symone Arroyo MD  Last diagnosis associated with med order: 1. Hyperlipidemia  - atorvastatin (LIPITOR) 10 MG tablet; Take 1 tablet (10 mg total) by mouth at bedtime.  Dispense: 90 tablet; Refill: 3    If protocol passes may refill for 12 months if within 3 months of last provider visit (or a total of 15 months).

## 2021-06-14 ENCOUNTER — RECORDS - HEALTHEAST (OUTPATIENT)
Dept: ADMINISTRATIVE | Facility: OTHER | Age: 61
End: 2021-06-14

## 2021-06-14 LAB
ALT SERPL W/O P-5'-P-CCNC: 10 IU/L (ref 5–35)
AST SERPL-CCNC: 29 U/L (ref 5–34)

## 2021-06-14 NOTE — PROGRESS NOTES
"Chief complaint: Here for a physical    History of present illness:   Radha comes in today for a general physical.  She sees Dr. Caitlin Joaquin and had a breast exam and pelvic exam done already.  She had a mammogram done at [facility] earlier this year.  Colonoscopy was in .  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  She has been sleeping better with large amounts of quetiapine and trazodone.  Her medications have been fairly stable due to her underlying lung issues with interstitial pneumonia and connective tissue disorder with positive Sjogren's.  She is having some problems with increased constipation and is on a complicated regimen including 4 senna daily, flaxseed and she is seeds and wonders if there is something else she can do.  It got worse with the Seroquel.    Social history:   Social History     Social History Narrative    She is  and they have 3 children. She works part-time as a .  Her  works for [employer] as a .  She does not smoke cigarettes and occasionally drinks alcohol, and tries to exercise regularly.       Family history:    Family History   Problem Relation Age of Onset     Cancer Father      Mesothelioma,  age 71     Diabetes Brother       age 43     Diabetes type I Son      Parkinsonism Mother      Alcohol abuse Brother        Review of systems: See above, the rest of the review of systems are negative for all systems.    Current allergies, medications, and past medical history are all reviewed and updated.    Physical exam:  Vitals:    12/15/17 0828   BP: 100/60   Patient Site: Left Arm   Patient Position: Sitting   Cuff Size: Adult Regular   Pulse: 72   Weight: 119 lb 12.8 oz (54.3 kg)   Height: 5' 6.5\" (1.689 m)     Body mass index is 19.05 kg/(m^2).  General Appearance:  Alert, cooperative, no distress, appears stated age   Head:  Normocephalic, without obvious abnormality, atraumatic   Eyes:  PERRL, conjunctiva/corneas clear, EOM's " intact   Ears:  Normal TM's and external ear canals, both ears   Nose: Nares normal, no drainage    Throat: Lips, mucosa, and tongue normal; teeth and gums normal   Neck: Supple, symmetrical, trachea midline, no adenopathy;  thyroid: not enlarged, symmetric, no tenderness/mass/nodules; no carotid bruit   Back:   Symmetric, no curvature, ROM normal   Lungs:   Clear to auscultation bilaterally, respirations unlabored   Heart:  Regular rate and rhythm, S1 and S2 normal, no murmur, rub, or gallop   Abdomen:   Soft, non-tender, no masses, no organomegaly, no hernias    Extremities: Extremities normal, atraumatic, no cyanosis or edema   Skin: Skin color, texture, turgor normal, no rashes or lesions   Lymph nodes: Cervical, supraclavicular, and axillary nodes normal   Neurologic: Nonfocal with facial symmetry      Assessment and plan:  1. Health care maintenance  Currently, she is up-to-date, see above.    2. 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  Dispense: 360 tablet; Refill: 5    3. Other osteoporosis without current pathological fracture  She is on Prolia.  She started this in January 2016.  - Vitamin D, Total (25-Hydroxy)    4.  Constipation  Recommended she try MiraLAX daily instead of the senna.    5. Agatston coronary artery calcium score less than 100    6. Hyperlipidemia, unspecified hyperlipidemia type  Check labs today.  She remains on a statin.  - Basic Metabolic Panel  - HM2(CBC w/o Differential)  - Thyroid Stimulating Hormone (TSH)  - Urinalysis-UC if Indicated  - Hepatic Profile  - Lipid [place]      April JOHN Walter MD  Internal and Geriatric Medicine  [facility]  12/15/2017    [facility]    Much or all of the text in this note was generated through the use of Dragon Dictate voice-to-text software. Errors in spelling or words which seem out of context are unintentional. Sound alike errors, in particular, may have  escaped editing.

## 2021-06-15 ENCOUNTER — RECORDS - HEALTHEAST (OUTPATIENT)
Dept: ADMINISTRATIVE | Facility: OTHER | Age: 61
End: 2021-06-15

## 2021-06-15 NOTE — TELEPHONE ENCOUNTER
RN cannot approve Refill Request    RN can NOT refill this medication PCP messaged that patient is overdue for Labs. Last office visit: 11/8/2019 Symone Arroyo MD Last Physical: 12/11/2019 Last MTM visit: Visit date not found Last visit same specialty: 11/8/2019 Symone Arroyo MD.  Next visit within 3 mo: Visit date not found  Next physical within 3 mo: Visit date not found      Tennille Haas, [facility] Triage/Med Refill 2/10/2021    Requested Prescriptions   Pending Prescriptions Disp Refills     potassium chloride (K-DUR,KLOR-CON) 20 MEQ tablet [Pharmacy Med Name: POTASSIUM CHL ER 20MEQ* 20 Tablet] 90 tablet 3     Sig: TAKE 1 TABLET (20 MEQ TOTAL) BY MOUTH DAILY.       Potassium Supplements Refill Protocol Failed - 2/10/2021 10:21 AM        Failed - Potassium level in last 12 months     No results found for: LN-POTASSIUM          Passed - PCP or prescribing provider visit in past 12 months       Last office visit with prescriber/PCP: 11/8/2019 Symone Arroyo MD OR same dept: Visit date not found OR same specialty: 11/8/2019 Symone Arroyo MD  Last physical: 12/11/2019 Last MTM visit: Visit date not found   Next visit within 3 mo: Visit date not found  Next physical within 3 mo: Visit date not found  Prescriber OR PCP: Symone Arroyo MD  Last diagnosis associated with med order: 1. Hypokalemia  - potassium chloride (K-DUR,KLOR-CON) 20 MEQ tablet [Pharmacy Med Name: POTASSIUM CHL ER 20MEQ* 20 Tablet]; Take 1 tablet (20 mEq total) by mouth daily.  Dispense: 90 tablet; Refill: 3    If protocol passes may refill for 12 months if within 3 months of last provider visit (or a total of 15 months).

## 2021-06-15 NOTE — PROGRESS NOTES
Assessment and Plan:     1. Encounter for preventive care  She is up-to-date with regard to colon cancer screening, mammography.  She has held on dental care due to the pandemic.  Up-to-date on ophthalmologic care.  She is due for bone density.  Lifestyle is reviewed.  She continues to keep very active.  She maintains a steady weight.  Health maintenance labs are updated as below  - HM2(CBC w/o Differential)  - Comprehensive Metabolic Panel  - Hepatitis C Antibody (Anti-HCV)  - HIV Antigen/Antibody Screening Cascade  - Lipid [place] FASTING  - Gynecologic Cytology (PAP Smear)    2. Osteoporosis-recent foot fracture  Known history of osteoporosis in the setting of autoimmune/connective tissue disorder.  She has been on Prolia approximately 5 years.  Previous bisphosphonate usage approximately 1 year.  Of note, she remains on Imuran.  We are weaning hormone replacement therapy.  Therefore, we will update bone density and continue current therapy program.  We will check vitamin D.  Encourage regular calcium use and both weightbearing and balance exercise.  - denosumab 60 mg (PROLIA 60 mg/ml)  - DXA Bone Density Scan; Future  - Vitamin D, Total (25-Hydroxy)    3. Sicca syndrome (H)  Noted history of positive Sjogren's antibodies    4. NSIP (nonspecific interstitial pneumonia) (H)  Followed by Vicky Rene-appointment next week with an update on pulmonary functions    5. Atrophic vaginitis  We are going to wean her Activella.  She is taking it every other day.  We will go to every third day.  For vaginal dryness, will begin with Estrace as below  - estradioL (ESTRACE) 0.01 % (0.1 mg/gram) vaginal cream; Insert 1 g into the vagina daily. Daily for one week, then 2-3 times per week  Dispense: 42.5 g; Refill: 1    6. Menopause  Noted  - estradiol-norethindrone acet (ACTIVELLA) 0.5-0.1 mg per tablet; Weaning- every 3rd day  Dispense: 90 tablet; Refill: 3    7. 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 Personal history of other drug therapy  Ordered Prolia.  She will have to reschedule as it is not due until after tomorrow  - denosumab 60 mg (PROLIA 60 mg/ml)            Symone Arroyo MD  2/26/2021    Chief Complaint:  Chief Complaint   Patient presents with     Annual Exam       History of Present Illness:  Radha is a 60 y.o. female who is here today for an annual physical examination.  Of note, her history is significant for connective tissue disorder.  She is followed by rheumatology as well as pulmonary for her interstitial lung disease as well.  Of note also, she has mild coronary calcific lesions consistent with atherosclerosis-mild.  She has Raynaud's phenomenon and is actively treated for osteoporosis with Prolia.    She denies any major side effects.  She has close family members all over the country and her daughter is in [place].  She is looking forward to being able to visit with them at some point.    She follows regularly with both her rheumatologist and pulmonologist.    Health maintenance is reviewed.  She did have a Cologuard.  She is due for bone density as the last was done in 2018.  She is due for dental visit.  Mammogram is up-to-date.  She is due for a Pap and pelvic exam today.  Encouraged her to keep active with ophthalmologic and dermatologic care.    Review of Systems:    The rest of the review of systems are negative for all systems.    PFSH:  Social History: Her daughter is studying at a Conserv[employer] in [place].  Social History     Socioeconomic History     Marital status:      Spouse name: Not on file     Number of children: Not on file     Years of education: Not on file     Highest education level: Not on file   Occupational History     Not on file   Social Needs     Financial resource strain: Not on file     Food insecurity     Worry: Not on file     Inability: Not on file     Transportation needs     Medical: Not on file     Non-medical: Not on  "file   Tobacco Use     Smoking status: Never Smoker     Smokeless tobacco: Never Used   Substance and Sexual Activity     Alcohol use: Yes     Comment: occasional     Drug use: No     Sexual activity: Not on file   Lifestyle     Physical activity     Days per week: Not on file     Minutes per session: Not on file     Stress: Not on file   Relationships     Social connections     Talks on phone: Not on file     Gets together: Not on file     Attends Restorationist service: Not on file     Active member of club or organization: Not on file     Attends meetings of clubs or organizations: Not on file     Relationship status: Not on file     Intimate partner violence     Fear of current or ex partner: Not on file     Emotionally abused: Not on file     Physically abused: Not on file     Forced sexual activity: Not on file   Other Topics Concern     Not on file   Social History Narrative    She is  and they have 3 children. She works part-time as a .  Her  works for [employer] as a .  She does not smoke cigarettes and occasionally drinks alcohol, and tries to exercise regularly.       Social History     Tobacco Use   Smoking Status Never Smoker   Smokeless Tobacco Never Used       Past Medical History:   No Known Allergies    Family History: Nothing new  Family History   Problem Relation Age of Onset     Cancer Father         Mesothelioma,  age 71     Diabetes Brother          age 43     Diabetes type I Son      Diabetes Son      Parkinsonism Mother      Osteoporosis Mother      Alcohol abuse Brother        Physical Exam:  Vitals:    21 1244   BP: 108/70   Patient Site: Left Arm   Patient Position: Sitting   Cuff Size: Adult Regular   Pulse: 88   Resp: 18   SpO2: 98%   Weight: 123 lb 5 oz (55.9 kg)   Height: 5' 6\" (1.676 m)     Wt Readings from Last 3 Encounters:   21 123 lb 5 oz (55.9 kg)   19 120 lb 4.8 oz (54.6 kg)   19 121 lb (54.9 kg)       General Appearance:  " Alert, cooperative, no distress, appears stated age   Head:  Normocephalic, without obvious abnormality, atraumatic   Eyes:  PERRL, conjunctiva/corneas clear, EOM's intact   Ears:  Normal TM's and external ear canals, both ears   Nose: Nares normal, septum midline,mucosa normal, no drainage    Throat: Lips, mucosa, and tongue normal; teeth and gums normal   Neck: Supple, symmetrical, trachea midline, no adenopathy;  thyroid: not enlarged, symmetric, no tenderness/mass/nodules; no carotid bruit or JVD   Back:   Symmetric, no curvature, ROM normal, no CVA tenderness   Lungs:   Clear to auscultation bilaterally, respirations unlabored   Breasts:  No breast masses, tenderness, asymmetry, or nipple discharge.   Heart:  Regular rate and rhythm, S1 and S2 normal, no murmur, rub, or gallop   Abdomen:   Soft, non-tender, bowel sounds active all four quadrants,  no masses, no organomegaly   Genitalia: Normally developed genitalia with no external lesions or eruptions.  Vagina and cervix show no lesions, inflammation, discharge or tenderness.  No cystocele.  Uterus normal size and position.  No adnexal mass or tenderness.   Rectal:  No hemorrhoids, tone normal   Extremities: Extremities normal, atraumatic, no cyanosis or edema   Skin: Skin color, texture, turgor normal, no rashes or lesions   Lymph nodes: Cervical, supraclavicular, and axillary nodes normal   Neurologic: Normal, CN 2-12 intact     Medications:  Current Outpatient Medications   Medication Sig Dispense Refill     atorvastatin (LIPITOR) 10 MG tablet Take 1 tablet (10 mg total) by mouth at bedtime. 90 tablet 2     azaTHIOprine (IMURAN) 50 mg tablet Take 1 tablet by mouth 2 (two) times a day.       calcium carbonate (OS-KOLE) 600 mg calcium (1,500 mg) tablet Take 600 mg by mouth 2 (two) times a day with meals.       cetirizine (ZYRTEC) 10 MG tablet Take 10 mg by mouth daily.       cyclobenzaprine (FLEXERIL) 10 MG tablet Take 1 tablet (10 mg total) by mouth daily as  needed for muscle spasms. 90 tablet 5     estradioL (ESTRACE) 0.01 % (0.1 mg/gram) vaginal cream Insert 1 g into the vagina daily. Daily for one week, then 2-3 times per week 42.5 g 1     estradiol-norethindrone acet (ACTIVELLA) 0.5-0.1 mg per tablet Weaning- every 3rd day 90 tablet 3     fluconazole (DIFLUCAN) 150 MG tablet Use weekly for two weeks 2 tablet 0     MULTIVITAMIN ORAL        potassium chloride (K-DUR,KLOR-CON) 20 MEQ tablet TAKE 1 TABLET (20 MEQ TOTAL) BY MOUTH DAILY. 90 tablet 3     QUEtiapine (SEROQUEL) 100 MG tablet TAKE 2 TABLETS (200 MG TOTAL) BY MOUTH AT BEDTIME 180 tablet 3     spironolactone-hydrochlorothiazide (ALDACTAZIDE) 25-25 mg tablet TAKE 1.5 TABLETS BY MOUTH DAILY. 135 tablet 11     traZODone (DESYREL) 100 MG tablet TAKE 3 TO 4 TABLETS (300  MG TOTAL) BY MOUTH AT BEDTIME. 360 tablet 1     tretinoin, emollient, (RENOVA) 0.02 % Crea Apply topically daily. Apply sparingly to affected areas       VITAMIN B COMPLEX ORAL Take 1 capsule by mouth daily.       Current Facility-Administered Medications   Medication Dose Route Frequency Provider Last Rate Last Admin     [START ON 3/12/2021] denosumab 60 mg (PROLIA 60 mg/ml)  60 mg Subcutaneous Once Symone Arroyo MD           Immunizations:  Immunization History   Administered Date(s) Administered     Hep A, historic 11/17/2006, 05/01/2007     INFLUENZA,SEASONAL QUAD, PF, =/> 6months 09/22/2020     Influenza B8p0-60, 01/13/2010     Influenza, Seasonal, Inj PF IIV3 09/21/2010, 10/05/2011     Influenza, inj, historic,unspecified 10/29/2008, 09/14/2009, 09/15/2019, 09/22/2020     Influenza, seasonal,quad inj 6-35 mos 09/21/2012, 09/16/2013     Influenza,seasonal quad, PF 09/23/2014     Influenza,seasonal, Inj IIV3 12/04/2000, 12/03/2001, 10/29/2008, 09/14/2009, 09/21/2010, 10/05/2011, 09/21/2012, 09/16/2013, 09/23/2014, 09/17/2015, 09/13/2016, 09/20/2017, 09/16/2018     Influenza,seasonal,quad inj =/> 6months 09/17/2015, 09/13/2016,  09/20/2017     Pneumo Conj 13-V (2010&after) 12/12/2018, 09/29/2020     Pneumo Polysac 23-V 07/23/2012     Td, adult adsorbed, PF 08/30/2018     Tdap 06/25/2008     ZOSTER, RECOMBINANT, IM 07/01/2018, 10/08/2018

## 2021-06-15 NOTE — TELEPHONE ENCOUNTER
RN cannot approve Refill Request    RN can NOT refill this medication med is not covered by policy/route to provider. Last office visit: 11/8/2019 Symone Arroyo MD Last Physical: 2/26/2021 Last MTM visit: Visit date not found Last visit same specialty: 11/8/2019 Symone Arroyo MD.  Next visit within 3 mo: Visit date not found  Next physical within 3 mo: Visit date not found      Tennille Haas, [facility] Triage/Med Refill 3/15/2021    Requested Prescriptions   Pending Prescriptions Disp Refills     QUEtiapine (SEROQUEL) 100 MG tablet [Pharmacy Med Name: QUETIAPINE   Tablet] 180 tablet 3     Sig: TAKE 2 TABLETS (200 MG TOTAL) BY MOUTH AT BEDTIME       There is no refill protocol information for this order

## 2021-06-15 NOTE — PROGRESS NOTES
"Prolia Injection Phone Screen      Screening questions have been asked 2-3 days prior to administration visit for Prolia. If any questions are answered with \"Yes,\" this phone encounter were will routed to ordering provider for further evaluation.     1.  When was the last injection?  8/27/20    2.  Has insurance for this injection been verified?  Yes    3.  Did you experience any new onset achiness or rashes that lasted for over a month with your previous Prolia injection?   No    4.  Do you have a fever over 101?F or a new deep cough that is unusual for you today? No    5.  Have you started any new medications in the last 6 months that you were told could affect your immune system? These may have been prescribed by oncologist, transplant, rheumatology, or dermatology.   No    6.  In the last 6 months have you have gastric bypass or parathyroid surgery?   No    7.  Do you plan dental work requiring drilling into the bone such as implants/extractions or oral surgery in the next 2-3 months?   No    Patient informed if symptoms discussed above present prior to their administration appointment, they are to notify clinic immediately.     Paulina Stout    The following steps were completed to comply with the REMS program for Prolia:  1. Ordering provider has previously reviewed information in the Medication Guide and Patient Counseling Chart, including the serious risks of Prolia  and the symptoms of each risk and have been advised to seek prompt medical attention if they have signs or symptoms of any of the serious risks.  2. Provided each patient a copy of the Medication Guide and Patient Brochure.  See MAR for administration details.   Indication: Prolia  (denosumab) is a prescription medicine used to treat osteoporosis in patients who:   Are at high risk for fracture, meaning patients who have had a fracture related to osteoporosis, or who have multiple risk factors for fracture; Cannot use another osteoporosis " medicine or other osteoporosis medicines did not work well.   The timeline for early/late injections would be 4 weeks early and any time after the 6 month bettye. If a patient receives their injection late, then the subsequent injection would be 6 months from the date that they actually received the injection    Have the screening questions been asked prior to this administration? Yes

## 2021-06-16 PROBLEM — Z12.4 SCREENING FOR CERVICAL CANCER: Status: ACTIVE | Noted: 2021-03-09

## 2021-06-16 PROBLEM — R93.1 AGATSTON CORONARY ARTERY CALCIUM SCORE LESS THAN 100: Status: ACTIVE | Noted: 2017-06-09

## 2021-06-16 NOTE — TELEPHONE ENCOUNTER
RN cannot approve Refill Request    RN can NOT refill this medication Protocol failed and NO refill given. Last office visit: 11/8/2019 Symone Arroyo MD Last Physical: 2/26/2021 Last MTM visit: Visit date not found Last visit same specialty: 11/8/2019 Symone Arroyo MD.  Next visit within 3 mo: Visit date not found  Next physical within 3 mo: Visit date not found      Paula Lam, [facility] Triage/Med Refill 4/19/2021    Requested Prescriptions   Pending Prescriptions Disp Refills     spironolactone-hydrochlorothiazide (ALDACTAZIDE) 25-25 mg tablet [Pharmacy Med Name: SPIRONOLACTONE-HCTZ 25-25^^ 25-25 Tablet] 135 tablet 11     Sig: TAKE 1.5 TABLETS BY MOUTH DAILY.       There is no refill protocol information for this order

## 2021-06-16 NOTE — TELEPHONE ENCOUNTER
Central PA team  [phone]  Pool: HE PA MED ([id number])          PA has been initiated.       PA form completed and faxed insurance via Cover My Meds     Key:  RODOLFO LU Briseno: [id number]     Medication:  Estradiol 0.1MG/GM cream    Insurance:  [employer] - [employer]        Response will be received via fax and may take up to 5-10 business days depending on plan

## 2021-06-17 NOTE — TELEPHONE ENCOUNTER
Telephone Encounter by Jeny Erazo at 4/13/2021  3:21 PM     Author: Jeny Erazo Service: -- Author Type: Patient Access    Filed: 4/13/2021  3:25 PM Encounter Date: 4/7/2021 Status: Signed    : Jeny Erzao (Patient Access)       I called the pharmacy and I did relay that per an [facility] PA Rep, there wasn't a quantity limit pa override that could be done. She then transferred me to another rep named Nikia and there wasn't an override she could try.  She did recommend that the pharmacy call the Pharmacy Help Desk [phone] to see if there was anything they could help with, like a wasted medication override.    Another technician was working on the request and the patient did call the G and they are sending her out another tube.  I will disregard the request.

## 2021-06-18 NOTE — PROGRESS NOTES
[facility] Follow Up Note    Radha Morrison   58 y.o. female    Date of Visit: 6/12/2018    Chief Complaint   Patient presents with     Follow-up     med check     Subjective  Radha comes in today for follow-up of her chronic medications.  She continues to follow-up with rheumatology and pulmonary for her underlying nonspecific interstitial pneumonia along with Sjogren's syndrome and thrombocytopenia for which she is on azathioprine and prednisone which she is been slowly trying to taper.  Is also on Prolia which seemed to have increased her lipids prompting us to start her on a statin which she is tolerated well.    ROS A comprehensive review of systems was performed and was otherwise negative.    Social History     Social History Narrative    She is  and they have 3 children. She works part-time as a .  Her  works for [employer] as a .  She does not smoke cigarettes and occasionally drinks alcohol, and tries to exercise regularly.       Medications were reconciled.  Allergies, social and family history, and the problem list were all reviewed and updated.    Old records reviewed: As above    Exam  General Appearance: Pleasant and alert   Vitals:    06/12/18 0826   BP: 94/58   Patient Site: Left Arm   Patient Position: Sitting   Cuff Size: Adult Regular   Pulse: 82   SpO2: 99%   Weight: 118 lb 8 oz (53.8 kg)      Body mass index is 18.84 kg/(m^2).  Wt Readings from Last 3 Encounters:   06/12/18 118 lb 8 oz (53.8 kg)   12/15/17 119 lb 12.8 oz (54.3 kg)   06/09/17 117 lb 14.4 oz (53.5 kg)     HEENT: Sclera are clear.   Lungs: Normal respirations  Abdomen: Soft and nondistended  Extremities: No edema  Skin: No rashes  Neuro: Moves all extremities and has facial symmetry  Gait: Ambulates with a normal gait    Assessment/Plan  1. Hyperlipidemia, unspecified hyperlipidemia type  Continue statin, check labs.  - Basic Metabolic Panel  - Lipid Cascade  - Hepatic Profile  - HM2(CBC  w/o Differential)    2. Other osteoporosis without current pathological fracture  Doing well on Prolia with stability of her osteoporosis last bone density scan, due for another one in December.    3. NSIP (nonspecific interstitial pneumonia)  Is on treatment and followed by Dr. Irene and who apparently is going to retire and he is going to set her up with a different pulmonologist.    4. Sjogren's Syndrome  She follows with Dr. Arce from rheumatology and remains on medication.  Overall stable.          Agnieszka Walter MD  Internal and Geriatric Medicine  [facility]    Much or all of the text in this note was generated through the use of Dragon Dictate voice-to-text software. Errors in spelling or words which seem out of context are unintentional. Sound alike errors, in particular, may have escaped editing.

## 2021-06-19 NOTE — PROGRESS NOTES
Assessment:   Radha Morrison is a 58 y.o. y.o. female with past medical history significant for osteoporosis, Sjogren syndrome, Raynaud's phenomenon who presents today for follow-up regarding recurrent right low back pain and a sensation of weakness in bilateral lower extremities.  MRI lumbar spine from July 2016 shows loss of disc space height at L5-S1 with a diffuse annular bulge and small central disc extrusion.  The patient status post a bilateral L5-S1 transforaminal epidural steroid injection on August 4, 2016 which provided 90% relief of her pain lasting nearly 2 years.  Over the past 2 months, the patient is felt her same pain returned.    DEMETRICE: 11  Who 5: 22       Plan:     A shared decision making plan was used.  The patient's values and choices were respected.  The following represents what was discussed and decided upon by the physician assistant and the patient.      1.  DIAGNOSTIC TESTS: I reviewed the MRI lumbar spine from 2016.  No further diagnostic tests were ordered.    2.  PHYSICAL THERAPY: I placed an order for the patient to return to [facility] physical therapy.  The patient went to the medics physical therapy program through [employer] in 2016 and found to be very beneficial.  She now works an 8 [employer] would like to attend physical therapy closer to her workplace.    3.  MEDICATIONS: No changes are made to the patient's medications.  She tried using Aleve and it was not helpful so she stopped taking it.  She does use cyclobenzaprine as needed.    4.  INTERVENTIONS: No further interventions were ordered.  If patient's pain were to worsen, I recommend repeating the bilateral L5-S1 transforaminal epidural steroid injection.     5.  PATIENT EDUCATION:   The patient is in agreement with the above plan.  All questions were answered.     6.  FOLLOW-UP: I offered to follow-up with the patient to monitor her progress of physical therapy.  She prefers to follow-up as needed.  If she has any questions or  concerns, she should not hesitate to call.    Subjective:     Radha Morrison is a 58 y.o. female who presents today for follow-up regarding recurrent left greater than right low back pain and a sensation of weakness in bilateral lower extremities.  Patient is status post a bilateral L5-S1 transforaminal epidural steroid injection on August 4, 2016.  Patient reports that this injection provided 90% relief of her pain up until about 2 months ago.  Patient states that at that time she felt her same pain and sensation of weakness returned.  She denies any specific injury or event at that time to cause a recurrence of pain.  She does note that she started going on brisk evening walks with her son around that same time.    Patient complains of left greater than right low back pain.  Pain is located at the lumbosacral junction.  She denies any pain radiating into the buttocks or down the legs.  She states that she has a sensation of weakness in both legs when she first wakes up in the morning that lasts for several hours.  She denies any numbness or tingling down the legs.  Morning tends to be the worst time of day for her.  Symptoms improve as the day goes on to a point where she almost forgets about them.  She denies any alleviating factors for the pain.  She rates her pain as a 6 out of 10 at its worst.  She denies any loss of bowel or bladder control.  Denies any recent fevers, chills, sweats.    The patient attended 3 months of medics physical therapy in 2016.  She felt this was very beneficial.  She has been doing her home exercises on a regular basis.  She does not go to the chiropractor.  She has not had any additional injections.  She tried using Aleve and it was not helpful.  She uses cyclobenzaprine as needed.    Past medical history is reviewed and is unchanged in the interim.    Family history is reviewed and is unchanged in the interim.    Review of Systems:  Positive for poor sleep quality, back pain, color  changes in hands or feet, weakness.  Negative for numbness/tingling, loss of bowel/bladder control, footdrop, headache and dizziness, blurry vision, balance changes, nausea/vomiting.     Objective:   CONSTITUTIONAL:  Vital signs as above.  No acute distress.  The patient is well nourished and well groomed.    PSYCHIATRIC:  The patient is awake, alert, oriented to person, place and time.  The patient is answering questions appropriately with clear speech.  Normal affect.  HEENT: Normocephalic, atraumatic.  Sclera clear.    SKIN:  Skin over the face, posterior torso, bilateral upper and lower extremities is clean, dry, intact without rashes.  MUSCULOSKELETAL:  Gait is non-antalgic.  The patient is able to heel and toe walk without any difficulty.  Mild tenderness over the left greater than right lower lumbar paraspinal muscles at the lumbosacral junction.   Lumbar flexion full, but patient reports increased pain at end range of motion.  Lumbar extension mildly restricted.  Lumbar facet loading maneuvers increased low back pain.  The patient has 5/5 strength for the bilateral hip flexors, knee flexors/extensors, ankle dorsiflexors/plantar flexors, ankle evertors/invertors.    NEUROLOGICAL: 1-2+ patellar, achilles reflexes which are symmetric bilaterally.  No ankle clonus bilaterally.  Sensation to light touch is intact in the bilateral L4, L5, and S1 dermatomes.       RESULTS: I reviewed the MRI lumbar spine from [facility] dated July 26, 2016.  At L4-5 there is stable mild bilateral lateral recess stenosis.  At L5-S1 there is moderate loss of disc space height and a diffuse annular bulge and small central disc extrusion which results in bilateral lateral recess stenosis.  No significant spinal canal stenosis or foraminal stenosis.  Patient also has mild facet arthropathy from L2-3 through L5-S1.  Please see report for the past.

## 2021-06-19 NOTE — PROGRESS NOTES
The following steps were completed to comply with the REMS program for Prolia:  1. Ordering provider has previously reviewed information in the Medication Guide and Patient Counseling Chart, including the serious risks of Prolia  and the symptoms of each risk and have been advised  to seek prompt medical attention if they have signs or symptoms of any of the serious risks.  2. Provided each patient a copy of the Medication Guide and Patient Brochure.  See MAR for administration details.   Indication: Prolia  (denosumab) is a prescription medicine used to treat osteoporosis in patients who:   Are at high risk for fracture, meaning patients who have had a fracture related to osteoporosis, or who have multiple risk factors for fracture; Cannot use another osteoporosis medicine or other osteoporosis medicines did not work well.   The timeline for early/late injections would be 4 weeks early and any time after the 6 month bettye. If a patient receives their injection late, then the subsequent injection would be 6 months from the date that they actually received the injection    1.  When was the last injection?  1/29/18  2.  Has insurance for this injection been verified?  No    Did you experience any new onset achiness or rashes that lasted for over a month with your previous Prolia injection?   No    Do you have a fever over 101?F or a new deep cough that is unusual for you today? No    Have you started any new medications in the last 6 months that you were told could affect your immune system? These may have been prescribed by oncologist, transplant, rheumatology, or dermatology.   No    In the last 6 months have you have gastric bypass or parathyroid surgery?   No    Do you plan dental work requiring drilling into the bone such as implants/extractions or oral surgery in the next 2-3 months?   No

## 2021-06-22 ENCOUNTER — COMMUNICATION - HEALTHEAST (OUTPATIENT)
Dept: INTERNAL MEDICINE | Facility: CLINIC | Age: 61
End: 2021-06-22

## 2021-06-22 DIAGNOSIS — F19.982 INSOMNIA DUE TO DRUG (H): ICD-10-CM

## 2021-06-22 RX ORDER — TRAZODONE HYDROCHLORIDE 100 MG/1
TABLET ORAL
Qty: 360 TABLET | Refills: 1 | Status: SHIPPED | OUTPATIENT
Start: 2021-06-22 | End: 2022-04-14

## 2021-06-22 NOTE — TELEPHONE ENCOUNTER
Medication Request  Medication name: Quetiapine Fumarate 100 mg   Pharmacy Name and Location: [facility]   Reason for request: historical medication   When did you use medication last?:  Last refilled 10/02/18  Okay to leave a detailed message: yes

## 2021-06-22 NOTE — PROGRESS NOTES
Assessment and Plan: (Encounter to Establish Care)-    . 1. Sjogren's Syndrome-Interstitial Lung Disease  Chart reviewed.  She is managed by both rheumatology and pulmonary.  Condition is stable.  Most recent hemogram within normal limits    2. Preventative health care  Up-to-date on colonoscopy which is due in 2020.  Mammography is up-to-date.  Will provide Prevnar 13 given underlying lung condition.  Other immunizations are up-to-date  Ophthalmologic, dental and Derm care up-to-date  Patient is exercising regularly and eating a very healthful diet.  Body mass index is within the normal range.  - Comprehensive Metabolic Panel  - Lipid Cascade FASTING    3. Other osteoporosis  She has nicely progressed from osteoporosis to low bone mass.  Currently on Prolia.  We will continue the same for the next 2 years.  If her numbers continue to improve, will consider a 1-2-year treatment with bisphosphonate to ceiling gains.  If not on steroids, may consider drug holiday  - Vitamin D, Total (25-Hydroxy)  - Thyroid Cascade    4. Hypercholesteremia  With some coronary calcifications noted on CT scan.  Recommendations: New Lipitor.  We will update labs  - Comprehensive Metabolic Panel  - Lipid Nance FASTING    5. Agatston coronary artery calcium score less than 100  As above, continue on Lipitor    6. Medication monitoring encounter  CMP and glycohemoglobin ordered for med monitoring  - Glycosylated Hemoglobin A1c          Symone Arroyo MD  12/12/2018    Chief Complaint:  Chief Complaint   Patient presents with     [facility]     Annual Exam       History of Present Illness:  Radha is a 58 y.o. female who is here today for an introductory visit.  She is here to establish care with me and have a preventative health examination.  Of note, she has no major chief complaints.  Her electronic health record is reviewed.  Consultant notes are reviewed.  Health maintenance is reviewed.    Electronic health record is updated  with regard to health maintenance issues.    Lifestyle is reviewed.  She is exercising regularly by doing some low level weights, walking and cycling.  She exercises 4-5 times per week.  Her son is a type I diabetic and therefore, my she is very cognizant of healthy diet.    Review of Systems:    The rest of the review of systems are negative for all systems.    PFSH:  Social History:   Social History     Socioeconomic History     Marital status:      Spouse name: Not on file     Number of children: Not on file     Years of education: Not on file     Highest education level: Not on file   Social Needs     Financial resource strain: Not on file     Food insecurity - worry: Not on file     Food insecurity - inability: Not on file     Transportation needs - medical: Not on file     Transportation needs - non-medical: Not on file   Occupational History     Not on file   Tobacco Use     Smoking status: Never Smoker     Smokeless tobacco: Never Used   Substance and Sexual Activity     Alcohol use: Yes     Comment: occasional     Drug use: No     Sexual activity: Not on file   Other Topics Concern     Not on file   Social History Narrative    She is  and they have 3 children. She works part-time as a .  Her  works for [employer] as a .  She does not smoke cigarettes and occasionally drinks alcohol, and tries to exercise regularly.       Social History     Tobacco Use   Smoking Status Never Smoker   Smokeless Tobacco Never Used       Past Medical History:   Past Medical History:   Diagnosis Date     Cervicalgia      Idiopathic thrombocytopenic purpura (H)      Interstitial lung disease (H)      Interstitial pulmonary disease (H)      Lumbar radiculopathy      Osteopenia      Raynaud phenomenon      Right shoulder pain      Sjoegren syndrome (H)      Vasomotor rhinitis        No Known Allergies    Family History: Reviewed.  No new illnesses  Family History   Problem Relation Age of Onset  "    Cancer Father         Mesothelioma,  age 71     Diabetes Brother          age 43     Diabetes type I Son      Diabetes Son      Parkinsonism Mother      Osteoporosis Mother      Alcohol abuse Brother        Physical Exam:  Vitals:    18 0830   BP: (!) 84/58   Patient Site: Left Arm   Patient Position: Sitting   Cuff Size: Adult Regular   Pulse: (!) 102   SpO2: 98%   Weight: 116 lb 9.6 oz (52.9 kg)   Height: 5' 6\" (1.676 m)     Wt Readings from Last 3 Encounters:   18 116 lb 9.6 oz (52.9 kg)   18 119 lb (54 kg)   18 118 lb 8 oz (53.8 kg)       General Appearance:  Alert, cooperative, no distress, appears stated age   Head:  Normocephalic, without obvious abnormality, atraumatic   Eyes:  PERRL, conjunctiva/corneas clear, EOM's intact   Ears:  Normal TM's and external ear canals, both ears   Nose: Nares normal, septum midline,mucosa normal, no drainage    Throat: Lips, mucosa, and tongue normal; teeth and gums normal   Neck: Supple, symmetrical, trachea midline, no adenopathy;  thyroid: not enlarged, symmetric, no tenderness/mass/nodules; no carotid bruit or JVD   Back:   Symmetric, no curvature, ROM normal, no CVA tenderness   Lungs:   Clear to auscultation bilaterally, respirations unlabored   Heart:  Regular rate and rhythm, S1 and S2 normal, no murmur, rub, or gallop   Abdomen:   Soft, non-tender, bowel sounds active all four quadrants,  no masses, no organomegaly, no hernias    Extremities: Extremities normal, atraumatic, no cyanosis or edema   Skin: Skin color, texture, turgor normal, no rashes or lesions   Lymph nodes: Cervical, supraclavicular, and axillary nodes normal   Neurologic: Normal, CN 2-12 intact     Medications:  Current Outpatient Medications   Medication Sig Dispense Refill     atorvastatin (LIPITOR) 10 MG tablet TAKE 1 TABLET (10 MG TOTAL) BY MOUTH BEDTIME. 90 tablet 2     azaTHIOprine (IMURAN) 50 mg tablet Take 50 mg by mouth 2 (two) times a day.       " cetirizine (ZYRTEC) 10 MG tablet Take 10 mg by mouth daily.       cyclobenzaprine (FLEXERIL) 10 MG tablet Take 1 tablet (10 mg total) by mouth daily as needed for muscle spasms. 90 tablet 5     estradiol-norethindrone acet 0.5-0.1 mg per tablet        ipratropium (ATROVENT) 42 mcg (0.06 %) nasal spray 2 sprays into each nostril 4 (four) times a day as needed for rhinitis. 15 mL 2     MULTIVITAMIN ORAL        QUEtiapine (SEROQUEL) 100 MG tablet TAKE 2 TABLETS  200 MG TOTAL  BY MOUTH AT BEDTIME  99     spironolactone-hydrochlorothiazide (ALDACTAZIDE) 25-25 mg tablet Take 1 tablet by mouth daily.       traZODone (DESYREL) 100 MG tablet Take 3-4 tablets (300-400 mg total) by mouth at bedtime. 360 tablet 5     tretinoin, emollient, (RENOVA) 0.02 % Crea Apply topically daily. Apply sparingly to affected areas       Current Facility-Administered Medications   Medication Dose Route Frequency Provider Last Rate Last Dose     denosumab 60 mg (PROLIA 60 mg/ml)  60 mg Subcutaneous Q6 Months Agnieszka Walter MD   60 mg at 07/30/18 1102       Immunizations:  Immunization History   Administered Date(s) Administered     Hep A, historic 11/17/2006, 05/01/2007     Influenza [id number], 01/13/2010     Influenza, Seasonal, Inj PF IIV3 09/21/2010, 10/05/2011     Influenza, inj, historic,unspecified 10/29/2008, 09/14/2009     Influenza, seasonal,quad inj 36+ mos 09/17/2015, 09/13/2016, 09/20/2017     Influenza, seasonal,quad inj 6-35 mos 09/21/2012, 09/16/2013     Influenza,seasonal quad, PF 09/23/2014     Influenza,seasonal, Inj IIV3 12/04/2000, 12/03/2001, 10/29/2008, 09/14/2009, 09/21/2010, 10/05/2011, 09/21/2012, 09/16/2013, 09/23/2014, 09/17/2015, 09/13/2016, 09/20/2017, 09/16/2018     Pneumo Conj 13-V (2010&after) 12/12/2018     Pneumo Polysac 23-V 07/23/2012     Td, adult adsorbed, PF 08/30/2018     Tdap 06/25/2008     ZOSTER, RECOMBINANT, IM 07/01/2018, 10/08/2018

## 2021-06-23 NOTE — TELEPHONE ENCOUNTER
"Prolia Injection Phone Screen      Screening questions have been asked 2-3 days prior to administration visit for Prolia. If any questions are answered with \"Yes,\" this phone encounter were will routed to ordering provider for further evaluation.     1.  When was the last injection? 7/30/18  2.  Has insurance for this injection been verified?  Yes    3.  Did you experience any new onset achiness or rashes that lasted for over a month with your previous Prolia injection?   No    4.  Do you have a fever over 101?F or a new deep cough that is unusual for you today? No    5.  Have you started any new medications in the last 6 months that you were told could affect your immune system? These may have been prescribed by oncologist, transplant, rheumatology, or dermatology.   No    6.  In the last 6 months have you have gastric bypass or parathyroid surgery?   No    7.  Do you plan dental work requiring drilling into the bone such as implants/extractions or oral surgery in the next 2-3 months?   No    8. Do you have new insurance since the last injection? No    Patient informed if symptoms discussed above present prior to their administration appointment, they are to notify clinic immediately.     Vickie Kimbrough              "

## 2021-06-25 ENCOUNTER — RECORDS - HEALTHEAST (OUTPATIENT)
Dept: ADMINISTRATIVE | Facility: OTHER | Age: 61
End: 2021-06-25

## 2021-06-25 NOTE — TELEPHONE ENCOUNTER
RN cannot approve Refill Request    RN can NOT refill this medication  .Patient request early refill. Medication last filled 2/26/21 for qty 42.5 g refill 1. Provider to advise on request. . Last office visit: 11/8/2019 Symone Arroyo MD Last Physical: 2/26/2021 Last MTM visit: Visit date not found Last visit same specialty: 11/8/2019 Symone Arroyo MD.  Next visit within 3 mo: Visit date not found  Next physical within 3 mo: Visit date not found      Vinicius Velazquez, [facility] Triage/Med Refill 6/4/2021    Requested Prescriptions   Pending Prescriptions Disp Refills     estradioL (ESTRACE) 0.01 % (0.1 mg/gram) vaginal cream [Pharmacy Med Name: ESTRADIOL 0.1 MG/GM CREAM 0.1 Cream] 42.5 g 1     Sig: INSERT 1 GRAM INTO THE VAGINA DAILY FOR ONE WEEK, THEN USE 2-3 TIMES PER WEEK.       Hormone Replacement Therapy Refill Protocol Passed - 6/3/2021  3:43 PM        Passed - PCP or prescribing provider visit in past 12 months       Last office visit with prescriber/PCP: 11/8/2019 Symone Arroyo MD OR same dept: Visit date not found OR same specialty: 11/8/2019 Symone Arroyo MD  Last physical: 2/26/2021 Last MTM visit: Visit date not found     Next visit within 3 mo: Visit date not found  Next physical within 3 mo: Visit date not found  Prescriber OR PCP: Symone Arroyo MD  Last diagnosis associated with med order: 1. Atrophic vaginitis  - estradioL (ESTRACE) 0.01 % (0.1 mg/gram) vaginal cream [Pharmacy Med Name: ESTRADIOL 0.1 MG/GM CREAM 0.1 Cream]; INSERT 1 GRAM INTO THE VAGINA DAILY FOR ONE WEEK, THEN USE 2-3 TIMES PER WEEK.  Dispense: 42.5 g; Refill: 1     If protocol passes may refill for 3 months.

## 2021-06-26 NOTE — TELEPHONE ENCOUNTER
Refill Approved    Rx renewed per Medication Renewal Policy. Medication was last renewed on 12/15/2020.    Edna Fernandez, [facility] Triage/Med Refill 6/22/2021     Requested Prescriptions   Pending Prescriptions Disp Refills     traZODone (DESYREL) 100 MG tablet [Pharmacy Med Name: TRAZODONE  MG TABS** 100 Tablet] 360 tablet 1     Sig: TAKE 3 TO 4 TABLETS (300  MG TOTAL) BY MOUTH AT BEDTIME.       Tricyclics/Misc Antidepressant/Antianxiety Meds Refill Protocol Passed - 6/21/2021  9:02 AM        Passed - PCP or prescribing provider visit in last year     Last office visit with prescriber/PCP: 11/8/2019 Symone Arroyo MD OR same dept: Visit date not found OR same specialty: 11/8/2019 Symone Arroyo MD  Last physical: 2/26/2021 Last MTM visit: Visit date not found   Next visit within 3 mo: Visit date not found  Next physical within 3 mo: Visit date not found  Prescriber OR PCP: Symone Arroyo MD  Last diagnosis associated with med order: 1. 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  Dispense: 360 tablet; Refill: 1    If protocol passes may refill for 12 months if within 3 months of last provider visit (or a total of 15 months).

## 2021-06-27 ENCOUNTER — HEALTH MAINTENANCE LETTER (OUTPATIENT)
Age: 61
End: 2021-06-27

## 2021-06-30 ENCOUNTER — RECORDS - HEALTHEAST (OUTPATIENT)
Dept: HEALTH INFORMATION MANAGEMENT | Facility: CLINIC | Age: 61
End: 2021-06-30

## 2021-07-03 NOTE — ADDENDUM NOTE
Addendum Note by Judy Arroyo MD at 12/11/2019 10:00 AM     Author: Judy Arroyo MD Service: -- Author Type: Physician    Filed: 12/12/2019 10:33 AM Encounter Date: 12/11/2019 Status: Signed    : Judy Arroyo MD (Physician)    Addended by: JUDY ARROYO on: 12/12/2019 10:33 AM        Modules accepted: Orders

## 2021-07-23 ENCOUNTER — MYC MEDICAL ADVICE (OUTPATIENT)
Dept: INTERNAL MEDICINE | Facility: CLINIC | Age: 61
End: 2021-07-23

## 2021-07-23 DIAGNOSIS — B37.31 YEAST INFECTION OF THE VAGINA: ICD-10-CM

## 2021-07-26 RX ORDER — FLUCONAZOLE 150 MG/1
TABLET ORAL
Qty: 2 TABLET | Refills: 0 | Status: SHIPPED | OUTPATIENT
Start: 2021-07-26 | End: 2021-09-03

## 2021-08-05 ENCOUNTER — TRANSFERRED RECORDS (OUTPATIENT)
Dept: HEALTH INFORMATION MANAGEMENT | Facility: CLINIC | Age: 61
End: 2021-08-05

## 2021-08-05 LAB — EJECTION FRACTION: NORMAL %

## 2021-09-03 ENCOUNTER — OFFICE VISIT (OUTPATIENT)
Dept: INTERNAL MEDICINE | Facility: CLINIC | Age: 61
End: 2021-09-03
Payer: COMMERCIAL

## 2021-09-03 VITALS
OXYGEN SATURATION: 100 % | DIASTOLIC BLOOD PRESSURE: 64 MMHG | HEIGHT: 66 IN | WEIGHT: 122.2 LBS | SYSTOLIC BLOOD PRESSURE: 102 MMHG | BODY MASS INDEX: 19.64 KG/M2 | HEART RATE: 88 BPM

## 2021-09-03 DIAGNOSIS — I25.10 CORONARY ARTERY CALCIFICATION SEEN ON CT SCAN: ICD-10-CM

## 2021-09-03 DIAGNOSIS — M81.0 AGE-RELATED OSTEOPOROSIS WITHOUT CURRENT PATHOLOGICAL FRACTURE: Primary | ICD-10-CM

## 2021-09-03 DIAGNOSIS — E78.00 PURE HYPERCHOLESTEROLEMIA: ICD-10-CM

## 2021-09-03 DIAGNOSIS — B37.31 YEAST INFECTION OF THE VAGINA: ICD-10-CM

## 2021-09-03 DIAGNOSIS — Z92.29 PERSONAL HISTORY OF OTHER DRUG THERAPY: ICD-10-CM

## 2021-09-03 PROCEDURE — 91300 PR COVID VAC PFIZER DIL RECON 30 MCG/0.3 ML IM: CPT | Performed by: FAMILY MEDICINE

## 2021-09-03 PROCEDURE — 0003A PR COVID VAC PFIZER DIL RECON 30 MCG/0.3 ML IM: CPT | Performed by: FAMILY MEDICINE

## 2021-09-03 PROCEDURE — 99213 OFFICE O/P EST LOW 20 MIN: CPT | Mod: 25 | Performed by: INTERNAL MEDICINE

## 2021-09-03 RX ORDER — ATORVASTATIN CALCIUM 20 MG/1
20 TABLET, FILM COATED ORAL AT BEDTIME
Qty: 90 TABLET | Refills: 3 | Status: SHIPPED | OUTPATIENT
Start: 2021-09-03 | End: 2021-09-22

## 2021-09-03 RX ORDER — FLUCONAZOLE 150 MG/1
TABLET ORAL
Qty: 2 TABLET | Refills: 0 | Status: SHIPPED | OUTPATIENT
Start: 2021-09-03 | End: 2022-03-01

## 2021-09-03 RX ORDER — ASPIRIN 81 MG/1
81 TABLET ORAL DAILY
COMMUNITY

## 2021-09-03 ASSESSMENT — MIFFLIN-ST. JEOR: SCORE: 1136.05

## 2021-09-03 NOTE — PROGRESS NOTES
.[facility] Follow Up Note    Assessment/Plan:  1. Age-related osteoporosis without current pathological fracture  Reading well.  Due for Prolia injection.  However, she is also due for her third Pfizer vaccine.  Recommendation: We will proceed with Pfizer vaccine and defer Prolia for 2 weeks.  - denosumab (PROLIA) injection 60 mg  - Vitamin D Deficiency; Future    2. Personal history of other drug therapy    - denosumab (PROLIA) injection 60 mg    3. Yeast infection of the vagina    - fluconazole (DIFLUCAN) 150 MG tablet; [FLUCONAZOLE (DIFLUCAN) 150 MG TABLET] Use weekly for two weeks  Dispense: 2 tablet; Refill: 0      4. Coronary artery calcification  Moderate coronary calcifications noted on a CT scan done by pulmonary for follow-up of interstitial lung disease.  Subsequently referred to cardiology where she was then referred for stress echocardiogram.  This was normal.  New line she has now increased her dose of Lipitor and is taking a baby aspirin per day.  She will need follow-up labs.  She will schedule this with her Prolia injection.  - Lipid panel reflex to direct LDL Fasting; Future  - Comprehensive metabolic panel; Future    5.  Weaned HRT.  Some hot flashes-we will monitor  Will be important to monitor for bone loss as well.    Follow-up 6 months    Symone Arroyo MD, MD    Chief Complaint:  Chief Complaint   Patient presents with     Follow Up     Osteo        History of Present Illness:  Radha is a 61 year old female is here today for follow-up with regard to osteoporosis.  Of note, she has been on Prolia for greater than 5 years.  She was due for Prolia vaccine today.  However, she is considered immunocompromised on Imuran.  She is due for her third coronavirus vaccine.  She will have that instead.    She denies any other new problems.    She does report that she had a chest CT done by her pulmonologist.  It showed some coronary calcifications.  She was subsequently referred for  a stress test.  This was negative.  Her Lipitor was increased.  She is on a baby aspirin.    Of note also, she has weaned off her HRT.    Medical problems include interstitial lung disease-on Imuran  High risk med use.    Review of Systems:  A comprehensive review of systems was performed and was otherwise negative    PFSH:  Social History: Daughter has recently returned from [place] for 5 weeks day  History   Smoking Status     Never Smoker   Smokeless Tobacco     Never Used       Past History:   Current Outpatient Medications   Medication Sig Dispense Refill     aspirin 81 MG EC tablet Take 81 mg by mouth daily       atorvastatin (LIPITOR) 10 MG tablet [ATORVASTATIN (LIPITOR) 10 MG TABLET] Take 1 tablet (10 mg total) by mouth at bedtime. (Patient taking differently: Take 20 mg by mouth At Bedtime ) 90 tablet 2     azaTHIOprine (IMURAN) 50 mg tablet Take 1 tablet by mouth daily        calcium carbonate (OS-KOLE) 600 mg calcium (1,500 mg) tablet [CALCIUM CARBONATE (OS-KOLE) 600 MG CALCIUM (1,500 MG) TABLET] Take 600 mg by mouth 2 (two) times a day with meals.       cetirizine (ZYRTEC) 10 MG tablet [CETIRIZINE (ZYRTEC) 10 MG TABLET] Take 10 mg by mouth daily.       cyclobenzaprine (FLEXERIL) 10 MG tablet [CYCLOBENZAPRINE (FLEXERIL) 10 MG TABLET] Take 1 tablet (10 mg total) by mouth daily as needed for muscle spasms. 90 tablet 5     estradioL (ESTRACE) 0.01 % (0.1 mg/gram) vaginal cream [ESTRADIOL (ESTRACE) 0.01 % (0.1 MG/GRAM) VAGINAL CREAM] INSERT 1 GRAM INTO THE VAGINA DAILY FOR ONE WEEK, THEN USE 2-3 TIMES PER WEEK. 42.5 g 1     fluconazole (DIFLUCAN) 150 MG tablet [FLUCONAZOLE (DIFLUCAN) 150 MG TABLET] Use weekly for two weeks 2 tablet 0     MULTIVITAMIN ORAL [MULTIVITAMIN ORAL]        potassium chloride (K-DUR,KLOR-CON) 20 MEQ tablet [POTASSIUM CHLORIDE (K-DUR,KLOR-CON) 20 MEQ TABLET] TAKE 1 TABLET (20 MEQ TOTAL) BY MOUTH DAILY. 90 tablet 3     QUEtiapine (SEROQUEL) 100 MG tablet [QUETIAPINE (SEROQUEL) 100 MG  "TABLET] TAKE 2 TABLETS (200 MG TOTAL) BY MOUTH AT BEDTIME 180 tablet 3     spironolactone-hydrochlorothiazide (ALDACTAZIDE) 25-25 mg tablet [SPIRONOLACTONE-HYDROCHLOROTHIAZIDE (ALDACTAZIDE) 25-25 MG TABLET] TAKE 1.5 TABLETS BY MOUTH DAILY. 135 tablet 11     traZODone (DESYREL) 100 MG tablet [TRAZODONE (DESYREL) 100 MG TABLET] TAKE 3 TO 4 TABLETS (300  MG TOTAL) BY MOUTH AT BEDTIME. 360 tablet 1     tretinoin, emollient, (RENOVA) 0.02 % Crea [TRETINOIN, EMOLLIENT, (RENOVA) 0.02 % CREA] Apply topically daily. Apply sparingly to affected areas       VITAMIN B COMPLEX ORAL [VITAMIN B COMPLEX ORAL] Take 1 capsule by mouth daily.         Family History:     Physical Exam:  General Appearance:   Appears well in no acute distress  Vitals:    09/03/21 1340   BP: 102/64   BP Location: Left arm   Patient Position: Sitting   Cuff Size: Adult Regular   Pulse: 88   SpO2: 100%   Weight: 55.4 kg (122 lb 3.2 oz)   Height: 1.676 m (5' 6\")     Wt Readings from Last 3 Encounters:   09/03/21 55.4 kg (122 lb 3.2 oz)   02/26/21 55.9 kg (123 lb 5 oz)   12/11/19 54.6 kg (120 lb 4.8 oz)     Body mass index is 19.72 kg/m .        Data Review:    Analysis and Summary of Old Records (2): Reviewed [facility] records    Records Requested (1):       Other History Summarized (from other people in the room) (2):     Radiology Tests Summarized (XRAY/CT/MRI/DXA) (1):     Labs Reviewed (1): Ordered labs    Medicine Tests Reviewed (EKG/ECHO/COLONOSCOPY/EGD) (1):     Independent Review of EKG or X-RAY (2):       "

## 2021-09-15 ENCOUNTER — TRANSFERRED RECORDS (OUTPATIENT)
Dept: HEALTH INFORMATION MANAGEMENT | Facility: CLINIC | Age: 61
End: 2021-09-15

## 2021-09-15 LAB
ALT SERPL-CCNC: 22 LU/L (ref 5–35)
AST SERPL-CCNC: 25 U/L (ref 5–34)
CREATININE (EXTERNAL): 0.66 MG/DL (ref 0.5–1.3)

## 2021-09-17 ENCOUNTER — LAB (OUTPATIENT)
Dept: LAB | Facility: CLINIC | Age: 61
End: 2021-09-17
Payer: COMMERCIAL

## 2021-09-17 ENCOUNTER — ALLIED HEALTH/NURSE VISIT (OUTPATIENT)
Dept: FAMILY MEDICINE | Facility: CLINIC | Age: 61
End: 2021-09-17
Payer: COMMERCIAL

## 2021-09-17 DIAGNOSIS — M81.8 OTHER OSTEOPOROSIS WITHOUT CURRENT PATHOLOGICAL FRACTURE: Primary | ICD-10-CM

## 2021-09-17 DIAGNOSIS — M81.0 AGE-RELATED OSTEOPOROSIS WITHOUT CURRENT PATHOLOGICAL FRACTURE: ICD-10-CM

## 2021-09-17 LAB
ALBUMIN SERPL-MCNC: 4.1 G/DL (ref 3.5–5)
ALP SERPL-CCNC: 65 U/L (ref 45–120)
ALT SERPL W P-5'-P-CCNC: 12 U/L (ref 0–45)
ANION GAP SERPL CALCULATED.3IONS-SCNC: 9 MMOL/L (ref 5–18)
AST SERPL W P-5'-P-CCNC: 28 U/L (ref 0–40)
BILIRUB SERPL-MCNC: 0.7 MG/DL (ref 0–1)
BUN SERPL-MCNC: 19 MG/DL (ref 8–22)
CALCIUM SERPL-MCNC: 9.9 MG/DL (ref 8.5–10.5)
CHLORIDE BLD-SCNC: 101 MMOL/L (ref 98–107)
CHOLEST SERPL-MCNC: 176 MG/DL
CO2 SERPL-SCNC: 32 MMOL/L (ref 22–31)
CREAT SERPL-MCNC: 0.94 MG/DL (ref 0.6–1.1)
FASTING STATUS PATIENT QL REPORTED: YES
GFR SERPL CREATININE-BSD FRML MDRD: 66 ML/MIN/1.73M2
GLUCOSE BLD-MCNC: 87 MG/DL (ref 70–125)
HDLC SERPL-MCNC: 78 MG/DL
LDLC SERPL CALC-MCNC: 86 MG/DL
POTASSIUM BLD-SCNC: 4.1 MMOL/L (ref 3.5–5)
PROT SERPL-MCNC: 6.7 G/DL (ref 6–8)
SODIUM SERPL-SCNC: 142 MMOL/L (ref 136–145)
TRIGL SERPL-MCNC: 61 MG/DL

## 2021-09-17 PROCEDURE — 36415 COLL VENOUS BLD VENIPUNCTURE: CPT | Performed by: INTERNAL MEDICINE

## 2021-09-17 PROCEDURE — 82306 VITAMIN D 25 HYDROXY: CPT | Performed by: INTERNAL MEDICINE

## 2021-09-17 PROCEDURE — 80061 LIPID PANEL: CPT | Performed by: INTERNAL MEDICINE

## 2021-09-17 PROCEDURE — 80053 COMPREHEN METABOLIC PANEL: CPT | Performed by: INTERNAL MEDICINE

## 2021-09-17 PROCEDURE — 99207 PR NO CHARGE NURSE ONLY: CPT

## 2021-09-17 PROCEDURE — 96372 THER/PROPH/DIAG INJ SC/IM: CPT | Performed by: INTERNAL MEDICINE

## 2021-09-20 LAB — DEPRECATED CALCIDIOL+CALCIFEROL SERPL-MC: 48 UG/L (ref 30–80)

## 2021-09-21 ENCOUNTER — TRANSFERRED RECORDS (OUTPATIENT)
Dept: HEALTH INFORMATION MANAGEMENT | Facility: CLINIC | Age: 61
End: 2021-09-21

## 2021-09-27 ENCOUNTER — TRANSFERRED RECORDS (OUTPATIENT)
Dept: HEALTH INFORMATION MANAGEMENT | Facility: CLINIC | Age: 61
End: 2021-09-27

## 2021-10-08 ENCOUNTER — TRANSFERRED RECORDS (OUTPATIENT)
Dept: HEALTH INFORMATION MANAGEMENT | Facility: CLINIC | Age: 61
End: 2021-10-08

## 2021-10-17 ENCOUNTER — HEALTH MAINTENANCE LETTER (OUTPATIENT)
Age: 61
End: 2021-10-17

## 2021-11-19 DIAGNOSIS — N95.2 ATROPHIC VAGINITIS: ICD-10-CM

## 2021-11-22 RX ORDER — ESTRADIOL 0.1 MG/G
CREAM VAGINAL
Qty: 42.5 G | Refills: 3 | Status: SHIPPED | OUTPATIENT
Start: 2021-11-22 | End: 2022-03-01

## 2021-11-22 NOTE — TELEPHONE ENCOUNTER
"Last Written Prescription Date:  6/4/21  Last Fill Quantity: 42.5 g,  # refills: 1   Last office visit provider:  9/3/21     Requested Prescriptions   Pending Prescriptions Disp Refills     estradiol (ESTRACE) 0.1 MG/GM vaginal cream [Pharmacy Med Name: ESTRADIOL 0.1 MG/GM CREAM 0.1 Cream] 42.5 g 1     Sig: INSERT 1 GRAM INTO THE VAGINA DAILY FOR ONE WEEK, THEN USE 2-3 TIMES PER WEEK.       Hormone Replacement Therapy Passed - 11/19/2021  1:17 PM        Passed - Blood pressure under 140/90 in past 12 months     BP Readings from Last 3 Encounters:   09/03/21 102/64   02/26/21 108/70   12/11/19 94/62                 Passed - Recent (12 mo) or future (30 days) visit within the authorizing provider's specialty     Patient has had an office visit with the authorizing provider or a provider within the authorizing providers department within the previous 12 mos or has a future within next 30 days. See \"Patient Info\" tab in inbasket, or \"Choose Columns\" in Meds & Orders section of the refill encounter.              Passed - Patient has mammogram in past 2 years on file if age 50-75        Passed - Medication is active on med list        Passed - Patient is 18 years of age or older        Passed - No active pregnancy on record        Passed - No positive pregnancy test on record in past 12 months             Vinicius Velazquez RN 11/22/21 10:07 AM  "

## 2021-12-14 ENCOUNTER — TRANSFERRED RECORDS (OUTPATIENT)
Dept: HEALTH INFORMATION MANAGEMENT | Facility: CLINIC | Age: 61
End: 2021-12-14
Payer: COMMERCIAL

## 2021-12-14 LAB
ALT SERPL-CCNC: 14 LU/L (ref 5–35)
AST SERPL-CCNC: 32 U/L (ref 5–34)
CREATININE (EXTERNAL): 0.97 MG/DL (ref 0.5–1.3)

## 2021-12-15 ENCOUNTER — MYC MEDICAL ADVICE (OUTPATIENT)
Dept: INTERNAL MEDICINE | Facility: CLINIC | Age: 61
End: 2021-12-15

## 2021-12-15 ENCOUNTER — OFFICE VISIT (OUTPATIENT)
Dept: INTERNAL MEDICINE | Facility: CLINIC | Age: 61
End: 2021-12-15
Payer: COMMERCIAL

## 2021-12-15 VITALS
HEART RATE: 84 BPM | SYSTOLIC BLOOD PRESSURE: 112 MMHG | BODY MASS INDEX: 19.15 KG/M2 | WEIGHT: 122 LBS | DIASTOLIC BLOOD PRESSURE: 62 MMHG | HEIGHT: 67 IN | OXYGEN SATURATION: 99 %

## 2021-12-15 DIAGNOSIS — D69.1 PLATELET DISORDER (H): ICD-10-CM

## 2021-12-15 DIAGNOSIS — Z01.818 PREOP GENERAL PHYSICAL EXAM: Primary | ICD-10-CM

## 2021-12-15 DIAGNOSIS — J84.9 ILD (INTERSTITIAL LUNG DISEASE) (H): ICD-10-CM

## 2021-12-15 DIAGNOSIS — Z20.822 ENCOUNTER FOR LABORATORY TESTING FOR COVID-19 VIRUS: Primary | ICD-10-CM

## 2021-12-15 DIAGNOSIS — F51.01 PRIMARY INSOMNIA: ICD-10-CM

## 2021-12-15 DIAGNOSIS — I25.10 CORONARY ARTERY CALCIFICATION SEEN ON CT SCAN: ICD-10-CM

## 2021-12-15 LAB
ALBUMIN SERPL-MCNC: 4.1 G/DL (ref 3.5–5)
ALP SERPL-CCNC: 65 U/L (ref 45–120)
ALT SERPL W P-5'-P-CCNC: 13 U/L (ref 0–45)
ANION GAP SERPL CALCULATED.3IONS-SCNC: 10 MMOL/L (ref 5–18)
AST SERPL W P-5'-P-CCNC: 27 U/L (ref 0–40)
BASOPHILS # BLD AUTO: 0 10E3/UL (ref 0–0.2)
BASOPHILS NFR BLD AUTO: 0 %
BILIRUB DIRECT SERPL-MCNC: 0.3 MG/DL
BILIRUB SERPL-MCNC: 0.8 MG/DL (ref 0–1)
BUN SERPL-MCNC: 18 MG/DL (ref 8–22)
CALCIUM SERPL-MCNC: 9.9 MG/DL (ref 8.5–10.5)
CHLORIDE BLD-SCNC: 101 MMOL/L (ref 98–107)
CHOLEST SERPL-MCNC: 169 MG/DL
CO2 SERPL-SCNC: 30 MMOL/L (ref 22–31)
CREAT SERPL-MCNC: 0.87 MG/DL (ref 0.6–1.1)
EOSINOPHIL # BLD AUTO: 0.3 10E3/UL (ref 0–0.7)
EOSINOPHIL NFR BLD AUTO: 7 %
ERYTHROCYTE [DISTWIDTH] IN BLOOD BY AUTOMATED COUNT: 11.8 % (ref 10–15)
FASTING STATUS PATIENT QL REPORTED: YES
GFR SERPL CREATININE-BSD FRML MDRD: 72 ML/MIN/1.73M2
GLUCOSE BLD-MCNC: 99 MG/DL (ref 70–125)
HCT VFR BLD AUTO: 43.2 % (ref 35–47)
HDLC SERPL-MCNC: 80 MG/DL
HGB BLD-MCNC: 14 G/DL (ref 11.7–15.7)
IMM GRANULOCYTES # BLD: 0 10E3/UL
IMM GRANULOCYTES NFR BLD: 0 %
LDLC SERPL CALC-MCNC: 79 MG/DL
LYMPHOCYTES # BLD AUTO: 1.6 10E3/UL (ref 0.8–5.3)
LYMPHOCYTES NFR BLD AUTO: 33 %
MCH RBC QN AUTO: 30 PG (ref 26.5–33)
MCHC RBC AUTO-ENTMCNC: 32.4 G/DL (ref 31.5–36.5)
MCV RBC AUTO: 93 FL (ref 78–100)
MONOCYTES # BLD AUTO: 0.4 10E3/UL (ref 0–1.3)
MONOCYTES NFR BLD AUTO: 9 %
NEUTROPHILS # BLD AUTO: 2.4 10E3/UL (ref 1.6–8.3)
NEUTROPHILS NFR BLD AUTO: 51 %
PLATELET # BLD AUTO: 89 10E3/UL (ref 150–450)
POTASSIUM BLD-SCNC: 4.3 MMOL/L (ref 3.5–5)
PROT SERPL-MCNC: 7 G/DL (ref 6–8)
RBC # BLD AUTO: 4.66 10E6/UL (ref 3.8–5.2)
SODIUM SERPL-SCNC: 141 MMOL/L (ref 136–145)
TRIGL SERPL-MCNC: 49 MG/DL
WBC # BLD AUTO: 4.7 10E3/UL (ref 4–11)

## 2021-12-15 PROCEDURE — 80061 LIPID PANEL: CPT | Performed by: INTERNAL MEDICINE

## 2021-12-15 PROCEDURE — 99214 OFFICE O/P EST MOD 30 MIN: CPT | Performed by: INTERNAL MEDICINE

## 2021-12-15 PROCEDURE — 82248 BILIRUBIN DIRECT: CPT | Performed by: INTERNAL MEDICINE

## 2021-12-15 PROCEDURE — 80053 COMPREHEN METABOLIC PANEL: CPT | Performed by: INTERNAL MEDICINE

## 2021-12-15 PROCEDURE — 85025 COMPLETE CBC W/AUTO DIFF WBC: CPT | Performed by: INTERNAL MEDICINE

## 2021-12-15 PROCEDURE — 36415 COLL VENOUS BLD VENIPUNCTURE: CPT | Performed by: INTERNAL MEDICINE

## 2021-12-15 RX ORDER — QUETIAPINE FUMARATE 100 MG/1
TABLET, FILM COATED ORAL
Qty: 180 TABLET | Refills: 3 | Status: SHIPPED | OUTPATIENT
Start: 2021-12-15 | End: 2022-09-07

## 2021-12-15 RX ORDER — ATORVASTATIN CALCIUM 40 MG/1
40 TABLET, FILM COATED ORAL DAILY
Qty: 90 TABLET | Refills: 3 | Status: SHIPPED | OUTPATIENT
Start: 2021-12-15 | End: 2022-03-01 | Stop reason: ALTCHOICE

## 2021-12-15 ASSESSMENT — MIFFLIN-ST. JEOR: SCORE: 1143.08

## 2021-12-15 NOTE — PROGRESS NOTES
[facility]  [address] [address]  Phone: [phone]  Fax: [phone]  Primary Provider: Symone Arroyo  :[id number]}  PREOPERATIVE EVALUATION:  Today's date: 12/15/2021    Radha Morrison is a 61 year old female who presents for a preoperative evaluation.    Surgical Information:  Surgery/Procedure: right wrist surgery  Surgery Location: [facility]  Surgeon: Dr. Bearden  Surgery Date: 12/29/21  Where patient plans to recover: At home with family  Fax number for surgical facility:     Type of Anesthesia Anticipated: to be determined    Assessment/Plan:     DX: 1. Preop general physical exam  Radha is medically stable for anesthesia prior to surgery for ulnar neuropathy. Currently, she is free from symptoms of an infectious disease and cardiac nature. She has no untoward reaction to local or general anesthetic agents.    Medications are reviewed. She is able to be n.p.o. on the morning of surgery.    Aftercare will be completed by her .      - Basic metabolic panel  (Ca, Cl, CO2, Creat, Gluc, K, Na, BUN); Future  - CBC with Platelets & Differential; Future    2. Platelet disorder (H)  with clumping reported on platelets. Therefore, this lab will be run stat  - CBC with Platelets & Differential; Future    3. ILD (interstitial lung disease) (H)  stable    4. Coronary artery calcification seen on CT scan  started on atorvastatin and aspirin. Stable  - atorvastatin (LIPITOR) 40 MG tablet; Take 1 tablet (40 mg) by mouth daily  Dispense: 90 tablet; Refill: 3    NORMAL STRESS ECHO 8/2021    5. Primary insomnia  medications renewed  - QUEtiapine (SEROQUEL) 100 MG tablet; [QUETIAPINE (SEROQUEL) 100 MG TABLET] TAKE 2 TABLETS (200 MG TOTAL) BY MOUTH AT BEDTIME  Dispense: 180 tablet; Refill: 3          Return in about 6 months (around 6/15/2022) for Follow up.      Subjective     HPI related to upcoming procedure: Radha lester a  delightful 61-year-old female who is here today for preoperative evaluation prior to having surgery for ulnar neuropathy. Of note, overall, she is very stable. She has underlying interstitial lung disease-followed by pulmonary and history of platelet aggregation disorder i.e.. Otherwise, she knows well. She is treated with Prolia for osteoporosis.    Currently, she is feeling well. She is free from symptoms of an infectious disease and cardiovascular nature. She denies any cough, chest pain, fever or chills. Preop review of systems is as below.    Preop Questions 12/13/2021   1. Have you ever had a heart attack or stroke? No   2. Have you ever had surgery on your heart or blood vessels, such as a stent placement, a coronary artery bypass, or surgery on an artery in your head, neck, heart, or legs? No   3. Do you have chest pain with activity? No   4. Do you have a history of  heart failure? No   5. Do you currently have a cold, bronchitis or symptoms of other infection? No   6. Do you have a cough, shortness of breath, or wheezing? No   7. Do you or anyone in your family have previous history of blood clots? No   8. Do you or does anyone in your family have a serious bleeding problem such as prolonged bleeding following surgeries or cuts? No   9. Have you ever had problems with anemia or been told to take iron pills? No   10. Have you had any abnormal blood loss such as black, tarry or bloody stools, or abnormal vaginal bleeding? No   11. Have you ever had a blood transfusion? No   12. Are you willing to have a blood transfusion if it is medically needed before, during, or after your surgery? Yes   13. Have you or any of your relatives ever had problems with anesthesia? No   14. Do you have sleep apnea, excessive snoring or daytime drowsiness? No   15. Do you have any artifical heart valves or other implanted medical devices like a pacemaker, defibrillator, or continuous glucose monitor? No   16. Do you have  artificial joints? No   17. Are you allergic to latex? No       Health Care Directive:  Patient does not have a Health Care Directive or Living Will:    Preoperative Review of :   reviewed - no record of controlled substances prescribed.    Patient Active Problem List    Diagnosis Date Noted     Coronary artery calcification seen on CT scan 09/03/2021     Priority: Medium     Screening for cervical cancer 03/09/2021     Priority: Medium     2015, 2017 NIL paps  2/26/21 NIL pap, neg HPV. On Prolia and Imuran. Cotest every 3 years         NSIP (nonspecific interstitial pneumonia) (H)      Priority: Medium      on VATS lung biopsy 7/2012 and has underlying connective tissue disorder,   followed by          Other osteoporosis without current pathological fracture      Priority: Medium     Started Prolia 1/2016         Raynaud's Phenomenon      Priority: Medium     Created by Conversion         Sjogren's Syndrome      Priority: Medium     Has interstitial pneumonitis dry eyes/mouth, skin rash, Raynaud's,   thrombocytopenia, burning tongue syndrome, abnormal serology including   positive TOBY, SSA antibodies, rheumatoid factor, and IgM cardiolipin   antibodies and she is followed by Dr. Arce from rheumatology   currently on prednisone and Imuran         Agatston coronary artery calcium score less than 100 06/09/2017     Priority: Medium     Score of 7 in 2017         Vasomotor Rhinitis      Priority: Medium     Created by Conversion  Replacement Utility updated for latest IMO load         Insomnia 06/08/2016     Priority: Medium     Does not get into deep/restorative sleep without medications         Platelet disorder (H) 06/08/2016     Priority: Medium     EDTA resistant         Cervicalgia      Priority: Medium     Created by Conversion         Lumbar Radiculopathy      Priority: Medium     Created by Conversion          Past Medical History:   Diagnosis Date     Cervicalgia      Idiopathic  thrombocytopenic purpura (H)      Interstitial lung disease (H)      Interstitial pulmonary disease (H)      Lumbar radiculopathy      Osteopenia      Raynaud phenomenon      Right shoulder pain      Sjoegren syndrome      Vasomotor rhinitis      Past Surgical History:   Procedure Laterality Date     HC REPAIR OF NASAL SEPTUM      Description: Septoplasty;  Recorded: 07/12/2012;     IR CERVICAL EPIDURAL STEROID INJECTION  3/23/2009     SD THORACOSCOPY W/DX BX OF LUNG INFILTRATE UNILATRL      Description: Thoracoscopy Of Lungs And Pleural Space With Biopsy Of Lung Infiltrates;  Recorded: 07/23/2012;     Current Outpatient Medications   Medication Sig Dispense Refill     aspirin 81 MG EC tablet Take 81 mg by mouth daily       atorvastatin (LIPITOR) 40 MG tablet Take 1 tablet (40 mg) by mouth daily 90 tablet 3     azaTHIOprine (IMURAN) 50 mg tablet Take 1 tablet by mouth daily        calcium carbonate (OS-KOLE) 600 mg calcium (1,500 mg) tablet [CALCIUM CARBONATE (OS-KOLE) 600 MG CALCIUM (1,500 MG) TABLET] Take 600 mg by mouth 2 (two) times a day with meals.       cetirizine (ZYRTEC) 10 MG tablet [CETIRIZINE (ZYRTEC) 10 MG TABLET] Take 10 mg by mouth daily.       cyclobenzaprine (FLEXERIL) 10 MG tablet TAKE 1 TABLET (10 MG TOTAL) BY MOUTH DAILY AS NEEDED FOR MUSCLE SPASMS. 90 tablet 0     estradiol (ESTRACE) 0.1 MG/GM vaginal cream INSERT 1 GRAM INTO THE VAGINA DAILY FOR ONE WEEK, THEN USE 2-3 TIMES PER WEEK. 42.5 g 3     fluconazole (DIFLUCAN) 150 MG tablet [FLUCONAZOLE (DIFLUCAN) 150 MG TABLET] Use weekly for two weeks 2 tablet 0     MULTIVITAMIN ORAL [MULTIVITAMIN ORAL]        potassium chloride (K-DUR,KLOR-CON) 20 MEQ tablet [POTASSIUM CHLORIDE (K-DUR,KLOR-CON) 20 MEQ TABLET] TAKE 1 TABLET (20 MEQ TOTAL) BY MOUTH DAILY. 90 tablet 3     QUEtiapine (SEROQUEL) 100 MG tablet [QUETIAPINE (SEROQUEL) 100 MG TABLET] TAKE 2 TABLETS (200 MG TOTAL) BY MOUTH AT BEDTIME 180 tablet 3     spironolactone-hydrochlorothiazide  "(ALDACTAZIDE) 25-25 mg tablet [SPIRONOLACTONE-HYDROCHLOROTHIAZIDE (ALDACTAZIDE) 25-25 MG TABLET] TAKE 1.5 TABLETS BY MOUTH DAILY. 135 tablet 11     traZODone (DESYREL) 100 MG tablet [TRAZODONE (DESYREL) 100 MG TABLET] TAKE 3 TO 4 TABLETS (300  MG TOTAL) BY MOUTH AT BEDTIME. 360 tablet 1     tretinoin, emollient, (RENOVA) 0.02 % Crea [TRETINOIN, EMOLLIENT, (RENOVA) 0.02 % CREA] Apply topically daily. Apply sparingly to affected areas       VITAMIN B COMPLEX ORAL [VITAMIN B COMPLEX ORAL] Take 1 capsule by mouth daily.         No Known Allergies     Social History     Tobacco Use     Smoking status: Never Smoker     Smokeless tobacco: Never Used   Substance Use Topics     Alcohol use: Yes     Comment: Alcoholic Drinks/day: occasional     History   Drug Use No         Objective     /62 (BP Location: Left arm, Patient Position: Sitting, Cuff Size: Adult Regular)   Pulse 84   Ht 1.689 m (5' 6.5\")   Wt 55.3 kg (122 lb)   SpO2 99%   BMI 19.40 kg/m      Physical Exam  EYES: Eyelids, conjunctiva, and sclera were normal. Pupils were normal. Cornea, iris, and lens were normal bilaterally.  HEAD, EARS, NOSE, MOUTH, AND THROAT: Head and face were normal. Hearing was normal to voice and the ears were normal to external exam. Nose appearance was normal and there was no discharge. Oropharynx was normal.  TMs were normal.  NECK: Neck appearance was normal. There were no neck masses and the thyroid was not enlarged.  RESPIRATORY: Breathing pattern was normal and the chest moved symmetrically.   Lung sounds were equal bilaterally.  CARDIOVASCULAR: Heart rate and rhythm were normal.  S1 and S2 were normal and there were no extra sounds or murmurs. Peripheral pulses in arms and legs were normal.  Jugular venous pressure was normal.  There was no peripheral edema.  GASTROINTESTINAL: The abdomen was normal in contour.  Bowel sounds were present.   Palpation detected no tenderness, mass, or enlarged organs. "   MUSCULOSKELETAL: Skeletal configuration was normal and muscle mass was normal for age. Joint appearance was overall normal.  LYMPHATIC: There were no enlarged nodes.  SKIN/HAIR/NAILS: Skin color was normal.  There were no abnormal skin lesions.  Hair and nails were normal.  NEUROLOGIC: The patient was alert and oriented to person, place, time, and circumstance. Speech was normal. Cranial nerves were normal. Motor strength was normal for age. The patient was normally coordinated.  PSYCHIATRIC:  Mood and affect were normal and the patient had normal recent and remote memory. The patient's judgment and insight were normal.      A basic metabolic profile and CBC with platelets are pending    Recent Labs   Lab Test 09/17/21  0748 03/15/21  0000 02/26/21  1326   HGB  --   --  13.2     --  141   POTASSIUM 4.1  --  3.7   CR 0.94 0.80 0.92     Lab Results   Component Value Date    VITDT 48 09/17/2021     No components found for: VITD  Lab Results   Component Value Date    CHOL 176 09/17/2021        Diagnostics:  Labs pending at this time.  Results will be reviewed when available.   No EKG required for low risk surgery (cataract, skin procedure, breast biopsy, etc).   She had a normal EKG and stress echocardiogram done at [place] in August 2021    Revised Cardiac Risk Index (RCRI):  The patient has the following serious cardiovascular risks for perioperative complications:   - No serious cardiac risks = 0 points     RCRI Interpretation: 0 points: Class I (very low risk - 0.4% complication rate)      The visit lasted a total of 30 minutes          Signed Electronically by: Symone Arroyo MD

## 2021-12-21 RX ORDER — ATORVASTATIN CALCIUM 80 MG/1
80 TABLET, FILM COATED ORAL DAILY
Qty: 90 TABLET | Refills: 2 | Status: SHIPPED | OUTPATIENT
Start: 2021-12-21 | End: 2022-05-10

## 2021-12-27 ENCOUNTER — LAB (OUTPATIENT)
Dept: LAB | Facility: CLINIC | Age: 61
End: 2021-12-27
Attending: INTERNAL MEDICINE
Payer: COMMERCIAL

## 2021-12-27 DIAGNOSIS — Z20.822 ENCOUNTER FOR LABORATORY TESTING FOR COVID-19 VIRUS: ICD-10-CM

## 2021-12-27 PROCEDURE — U0003 INFECTIOUS AGENT DETECTION BY NUCLEIC ACID (DNA OR RNA); SEVERE ACUTE RESPIRATORY SYNDROME CORONAVIRUS 2 (SARS-COV-2) (CORONAVIRUS DISEASE [COVID-19]), AMPLIFIED PROBE TECHNIQUE, MAKING USE OF HIGH THROUGHPUT TECHNOLOGIES AS DESCRIBED BY CMS-2020-01-R: HCPCS

## 2021-12-27 PROCEDURE — U0005 INFEC AGEN DETEC AMPLI PROBE: HCPCS

## 2021-12-28 LAB — SARS-COV-2 RNA RESP QL NAA+PROBE: NEGATIVE

## 2021-12-29 ENCOUNTER — TRANSFERRED RECORDS (OUTPATIENT)
Dept: HEALTH INFORMATION MANAGEMENT | Facility: CLINIC | Age: 61
End: 2021-12-29
Payer: COMMERCIAL

## 2022-01-10 ENCOUNTER — TRANSFERRED RECORDS (OUTPATIENT)
Dept: HEALTH INFORMATION MANAGEMENT | Facility: CLINIC | Age: 62
End: 2022-01-10
Payer: COMMERCIAL

## 2022-02-24 DIAGNOSIS — E87.6 HYPOKALEMIA: ICD-10-CM

## 2022-02-25 RX ORDER — POTASSIUM CHLORIDE 1500 MG/1
TABLET, EXTENDED RELEASE ORAL
Qty: 90 TABLET | Refills: 3 | Status: SHIPPED | OUTPATIENT
Start: 2022-02-25 | End: 2022-11-05

## 2022-02-25 NOTE — TELEPHONE ENCOUNTER
"Routing refill request to provider for review/approval because:  Drug interaction warning    Last Written Prescription Date:  2/10/21  Last Fill Quantity: 90,  # refills: 3   Last office visit provider:  12/15/21     Requested Prescriptions   Pending Prescriptions Disp Refills     potassium chloride ER (KLOR-CON M) 20 MEQ CR tablet [Pharmacy Med Name: POTASSIUM CL 20 MEQ ER 20 Tablet] 90 tablet 3     Sig: TAKE 1 TABLET (20 MEQ TOTAL) BY MOUTH DAILY.       Potassium Supplements Protocol Passed - 2/25/2022  1:45 PM        Passed - Recent (12 mo) or future (30 days) visit within the authorizing provider's department     Patient has had an office visit with the authorizing provider or a provider within the authorizing providers department within the previous 12 mos or has a future within next 30 days. See \"Patient Info\" tab in inbasket, or \"Choose Columns\" in Meds & Orders section of the refill encounter.              Passed - Medication is active on med list        Passed - Patient is age 18 or older        Passed - Normal serum potassium in past 12 months     Recent Labs   Lab Test 12/15/21  0846   POTASSIUM 4.3                         Vinicius Velazquez RN 02/25/22 1:45 PM  "

## 2022-03-01 ENCOUNTER — OFFICE VISIT (OUTPATIENT)
Dept: INTERNAL MEDICINE | Facility: CLINIC | Age: 62
End: 2022-03-01
Payer: COMMERCIAL

## 2022-03-01 VITALS
BODY MASS INDEX: 19.38 KG/M2 | HEART RATE: 90 BPM | HEIGHT: 66 IN | SYSTOLIC BLOOD PRESSURE: 98 MMHG | WEIGHT: 120.6 LBS | DIASTOLIC BLOOD PRESSURE: 66 MMHG | OXYGEN SATURATION: 99 % | RESPIRATION RATE: 18 BRPM

## 2022-03-01 DIAGNOSIS — N95.2 ATROPHIC VAGINITIS: ICD-10-CM

## 2022-03-01 DIAGNOSIS — D69.1 PLATELET DISORDER (H): ICD-10-CM

## 2022-03-01 DIAGNOSIS — Z23 HIGH PRIORITY FOR 2019-NCOV VACCINE: ICD-10-CM

## 2022-03-01 DIAGNOSIS — Z92.25 PERSONAL HISTORY OF IMMUNOSUPPRESSIVE THERAPY: ICD-10-CM

## 2022-03-01 DIAGNOSIS — Z79.899 HIGH RISK MEDICATION USE: ICD-10-CM

## 2022-03-01 DIAGNOSIS — M81.8 OTHER OSTEOPOROSIS WITHOUT CURRENT PATHOLOGICAL FRACTURE: ICD-10-CM

## 2022-03-01 DIAGNOSIS — J84.89 NSIP (NONSPECIFIC INTERSTITIAL PNEUMONIA) (H): ICD-10-CM

## 2022-03-01 DIAGNOSIS — I25.10 CORONARY ARTERY CALCIFICATION SEEN ON CT SCAN: ICD-10-CM

## 2022-03-01 DIAGNOSIS — H61.21 IMPACTED CERUMEN OF RIGHT EAR: ICD-10-CM

## 2022-03-01 DIAGNOSIS — B37.31 YEAST INFECTION OF THE VAGINA: ICD-10-CM

## 2022-03-01 DIAGNOSIS — Z00.00 ENCOUNTER FOR PREVENTIVE CARE: Primary | ICD-10-CM

## 2022-03-01 LAB
ALBUMIN SERPL-MCNC: 4.6 G/DL (ref 3.5–5)
ALP SERPL-CCNC: 68 U/L (ref 45–120)
ALT SERPL W P-5'-P-CCNC: 16 U/L (ref 0–45)
AST SERPL W P-5'-P-CCNC: 33 U/L (ref 0–40)
BASOPHILS # BLD AUTO: 0 10E3/UL (ref 0–0.2)
BASOPHILS NFR BLD AUTO: 1 %
BILIRUB DIRECT SERPL-MCNC: 0.3 MG/DL
BILIRUB SERPL-MCNC: 0.7 MG/DL (ref 0–1)
CHOLEST SERPL-MCNC: 180 MG/DL
EOSINOPHIL # BLD AUTO: 0.3 10E3/UL (ref 0–0.7)
EOSINOPHIL NFR BLD AUTO: 6 %
ERYTHROCYTE [DISTWIDTH] IN BLOOD BY AUTOMATED COUNT: 12.1 % (ref 10–15)
FASTING STATUS PATIENT QL REPORTED: YES
HCT VFR BLD AUTO: 45.9 % (ref 35–47)
HDLC SERPL-MCNC: 84 MG/DL
HGB BLD-MCNC: 14.9 G/DL (ref 11.7–15.7)
IMM GRANULOCYTES # BLD: 0 10E3/UL
IMM GRANULOCYTES NFR BLD: 0 %
LDLC SERPL CALC-MCNC: 82 MG/DL
LYMPHOCYTES # BLD AUTO: 1.7 10E3/UL (ref 0.8–5.3)
LYMPHOCYTES NFR BLD AUTO: 28 %
MCH RBC QN AUTO: 29.5 PG (ref 26.5–33)
MCHC RBC AUTO-ENTMCNC: 32.5 G/DL (ref 31.5–36.5)
MCV RBC AUTO: 91 FL (ref 78–100)
MONOCYTES # BLD AUTO: 0.4 10E3/UL (ref 0–1.3)
MONOCYTES NFR BLD AUTO: 7 %
NEUTROPHILS # BLD AUTO: 3.4 10E3/UL (ref 1.6–8.3)
NEUTROPHILS NFR BLD AUTO: 58 %
PLATELET # BLD AUTO: 77 10E3/UL (ref 150–450)
PROT SERPL-MCNC: 7.9 G/DL (ref 6–8)
RBC # BLD AUTO: 5.05 10E6/UL (ref 3.8–5.2)
TRIGL SERPL-MCNC: 70 MG/DL
WBC # BLD AUTO: 5.9 10E3/UL (ref 4–11)

## 2022-03-01 PROCEDURE — 69210 REMOVE IMPACTED EAR WAX UNI: CPT | Performed by: INTERNAL MEDICINE

## 2022-03-01 PROCEDURE — 0054A COVID-19,PF,PFIZER (12+ YRS): CPT | Performed by: INTERNAL MEDICINE

## 2022-03-01 PROCEDURE — 91305 COVID-19,PF,PFIZER (12+ YRS): CPT | Performed by: INTERNAL MEDICINE

## 2022-03-01 PROCEDURE — 36415 COLL VENOUS BLD VENIPUNCTURE: CPT | Performed by: INTERNAL MEDICINE

## 2022-03-01 PROCEDURE — 80076 HEPATIC FUNCTION PANEL: CPT | Performed by: INTERNAL MEDICINE

## 2022-03-01 PROCEDURE — 85025 COMPLETE CBC W/AUTO DIFF WBC: CPT | Performed by: INTERNAL MEDICINE

## 2022-03-01 PROCEDURE — 82306 VITAMIN D 25 HYDROXY: CPT | Performed by: INTERNAL MEDICINE

## 2022-03-01 PROCEDURE — 99214 OFFICE O/P EST MOD 30 MIN: CPT | Mod: 25 | Performed by: INTERNAL MEDICINE

## 2022-03-01 PROCEDURE — 80061 LIPID PANEL: CPT | Performed by: INTERNAL MEDICINE

## 2022-03-01 RX ORDER — ESTRADIOL 0.1 MG/G
CREAM VAGINAL
Qty: 42.5 G | Refills: 3 | Status: SHIPPED | OUTPATIENT
Start: 2022-03-01 | End: 2022-12-02

## 2022-03-01 RX ORDER — FLUCONAZOLE 150 MG/1
TABLET ORAL
Qty: 2 TABLET | Refills: 0 | Status: SHIPPED | OUTPATIENT
Start: 2022-03-01 | End: 2022-09-06

## 2022-03-01 NOTE — PROGRESS NOTES
prolaAnswers for HPI/ROS submitted by the patient on 2/26/2022  Frequency of exercise:: 4-5 days/week  Getting at least 3 servings of Calcium per day:: Yes  Diet:: Regular (no restrictions)  Taking medications regularly:: Yes  Medication side effects:: None  Bi-annual eye exam:: Yes  Dental care twice a year:: Yes  Sleep apnea or symptoms of sleep apnea:: None  Additional concerns today:: Yes  Duration of exercise:: 30-45 minutes    [facility] [place] [facility] [address] [place] [place] [address]  Phone: [phone]  Fax: [phone]  Primary Provider: Symone Arroyo        PREOPERATIVE EVALUATION:  Today's date: 3/1/2022    Radha Morrison is a 61 year old female who presents for a preoperative evaluation.    Surgical Information:  Surgery/Procedure:  Cataract   Surgery Location[facility]  Surgeon: Dr Gleason  Surgery Date: 3/3 (R), 3/17 (L)  Time of Surgery: TBD  Where patient plans to recover: At home with family  Fax number for surgical facility: [phone]    Type of Anesthesia Anticipated: to be determined        Subjective     HPI related to upcoming procedure: Radha is a delightful 61-year-old female who is here today for a preoperative evaluation prior to having cataract surgery.  Of note, she had a recent wrist surgery with general anesthesia none procedure proceeded without any events or issues.    In general, she is stable.  Her medical problems include rheumatologic disease/interstitial lung disease for which she takes low-dose Imuran.  She has no issues with functional activity.  She exercises regularly.  She is a non-smoker.  She has a history of coronary calcifications-under 100 and is on statin therapy but no previous cardiac events, arrhythmia, type 2 diabetes or chronic kidney disease.      Preop Questions 3/1/2022   1. Have you ever had a heart attack or stroke? No   2. Have you ever had surgery on your heart or blood  vessels, such as a stent placement, a coronary artery bypass, or surgery on an artery in your head, neck, heart, or legs? No   3. Do you have chest pain with activity? No   4. Do you have a history of  heart failure? No   5. Do you currently have a cold, bronchitis or symptoms of other infection? No   6. Do you have a cough, shortness of breath, or wheezing? No   7. Do you or anyone in your family have previous history of blood clots? No   8. Do you or does anyone in your family have a serious bleeding problem such as prolonged bleeding following surgeries or cuts? No   9. Have you ever had problems with anemia or been told to take iron pills? No   10. Have you had any abnormal blood loss such as black, tarry or bloody stools, or abnormal vaginal bleeding? No   11. Have you ever had a blood transfusion? No   12. Are you willing to have a blood transfusion if it is medically needed before, during, or after your surgery? Yes   13. Have you or any of your relatives ever had problems with anesthesia? No   14. Do you have sleep apnea, excessive snoring or daytime drowsiness? No   15. Do you have any artifical heart valves or other implanted medical devices like a pacemaker, defibrillator, or continuous glucose monitor? No   16. Do you have artificial joints? No   17. Are you allergic to latex? No       Health Care Directive:  Patient does not have a Health Care Directive or Living Will:       Patient is medically stable    Review of Systems  CONSTITUTIONAL: NEGATIVE for fever, chills, change in weight  INTEGUMENTARY/SKIN: NEGATIVE for worrisome rashes, moles or lesions  EYES: NEGATIVE for vision changes or irritation  ENT/MOUTH: NEGATIVE for ear, mouth and throat problems  RESP: NEGATIVE for significant cough or SOB  CV: NEGATIVE for chest pain, palpitations or peripheral edema  GI: NEGATIVE for nausea, abdominal pain, heartburn, or change in bowel habits  : NEGATIVE for frequency, dysuria, or  hematuria  MUSCULOSKELETAL: NEGATIVE for significant arthralgias or myalgia  NEURO: NEGATIVE for weakness, dizziness or paresthesias  ENDOCRINE: NEGATIVE for temperature intolerance, skin/hair changes  HEME: NEGATIVE for bleeding problems  PSYCHIATRIC: NEGATIVE for changes in mood or affect    Patient Active Problem List    Diagnosis Date Noted     Coronary artery calcification seen on CT scan 09/03/2021     Priority: Medium     Screening for cervical cancer 03/09/2021     Priority: Medium     2015, 2017 NIL paps  2/26/21 NIL pap, neg HPV. On Prolia and Imuran. Cotest every 3 years         NSIP (nonspecific interstitial pneumonia) (H)      Priority: Medium      on VATS lung biopsy 7/2012 and has underlying connective tissue disorder,   followed by          Other osteoporosis without current pathological fracture      Priority: Medium     Started Prolia 1/2016         Raynaud's Phenomenon      Priority: Medium     Created by Conversion         Sjogren's Syndrome      Priority: Medium     Has interstitial pneumonitis dry eyes/mouth, skin rash, Raynaud's,   thrombocytopenia, burning tongue syndrome, abnormal serology including   positive TOBY, SSA antibodies, rheumatoid factor, and IgM cardiolipin   antibodies and she is followed by Dr. Arce from rheumatology   currently on prednisone and Imuran         Agatston coronary artery calcium score less than 100 06/09/2017     Priority: Medium     Score of 7 in 2017         Vasomotor Rhinitis      Priority: Medium     Created by Conversion  Replacement Utility updated for latest IMO load         Insomnia 06/08/2016     Priority: Medium     Does not get into deep/restorative sleep without medications         Platelet disorder (H) 06/08/2016     Priority: Medium     EDTA resistant         Cervicalgia      Priority: Medium     Created by Conversion         Lumbar Radiculopathy      Priority: Medium     Created by Conversion          Past Medical History:    Diagnosis Date     Cervicalgia      Idiopathic thrombocytopenic purpura (H)      Interstitial lung disease (H)      Interstitial pulmonary disease (H)      Lumbar radiculopathy      Osteopenia      Raynaud phenomenon      Right shoulder pain      Sjoegren syndrome      Vasomotor rhinitis      Past Surgical History:   Procedure Laterality Date     HC REPAIR OF NASAL SEPTUM      Description: Septoplasty;  Recorded: 07/12/2012;     IR CERVICAL EPIDURAL STEROID INJECTION  3/23/2009     CA THORACOSCOPY W/DX BX OF LUNG INFILTRATE UNILATRL      Description: Thoracoscopy Of Lungs And Pleural Space With Biopsy Of Lung Infiltrates;  Recorded: 07/23/2012;     Current Outpatient Medications   Medication Sig Dispense Refill     aspirin 81 MG EC tablet Take 81 mg by mouth daily       atorvastatin (LIPITOR) 80 MG tablet Take 1 tablet (80 mg) by mouth daily 90 tablet 2     azaTHIOprine (IMURAN) 50 mg tablet Take 1 tablet by mouth daily        calcium carbonate (OS-KOLE) 600 mg calcium (1,500 mg) tablet [CALCIUM CARBONATE (OS-KOLE) 600 MG CALCIUM (1,500 MG) TABLET] Take 600 mg by mouth 2 (two) times a day with meals.       cetirizine (ZYRTEC) 10 MG tablet [CETIRIZINE (ZYRTEC) 10 MG TABLET] Take 10 mg by mouth daily.       cyclobenzaprine (FLEXERIL) 10 MG tablet TAKE 1 TABLET (10 MG TOTAL) BY MOUTH DAILY AS NEEDED FOR MUSCLE SPASMS. 90 tablet 0     estradiol (ESTRACE) 0.1 MG/GM vaginal cream INSERT 1 GRAM INTO THE VAGINA DAILY FOR ONE WEEK, THEN USE 2-3 TIMES PER WEEK. 42.5 g 3     fluconazole (DIFLUCAN) 150 MG tablet [FLUCONAZOLE (DIFLUCAN) 150 MG TABLET] Use weekly for two weeks 2 tablet 0     MULTIVITAMIN ORAL [MULTIVITAMIN ORAL]        potassium chloride ER (KLOR-CON M) 20 MEQ CR tablet TAKE 1 TABLET (20 MEQ TOTAL) BY MOUTH DAILY. 90 tablet 3     QUEtiapine (SEROQUEL) 100 MG tablet [QUETIAPINE (SEROQUEL) 100 MG TABLET] TAKE 2 TABLETS (200 MG TOTAL) BY MOUTH AT BEDTIME 180 tablet 3     spironolactone-hydrochlorothiazide  "(ALDACTAZIDE) 25-25 mg tablet [SPIRONOLACTONE-HYDROCHLOROTHIAZIDE (ALDACTAZIDE) 25-25 MG TABLET] TAKE 1.5 TABLETS BY MOUTH DAILY. 135 tablet 11     traZODone (DESYREL) 100 MG tablet [TRAZODONE (DESYREL) 100 MG TABLET] TAKE 3 TO 4 TABLETS (300  MG TOTAL) BY MOUTH AT BEDTIME. 360 tablet 1     tretinoin, emollient, (RENOVA) 0.02 % Crea [TRETINOIN, EMOLLIENT, (RENOVA) 0.02 % CREA] Apply topically daily. Apply sparingly to affected areas       VITAMIN B COMPLEX ORAL [VITAMIN B COMPLEX ORAL] Take 1 capsule by mouth daily.       atorvastatin (LIPITOR) 40 MG tablet Take 1 tablet (40 mg) by mouth daily 90 tablet 3       No Known Allergies     Social History     Tobacco Use     Smoking status: Never Smoker     Smokeless tobacco: Never Used   Substance Use Topics     Alcohol use: Yes     Comment: Alcoholic Drinks/day: occasional     Family History   Problem Relation Age of Onset     Cancer Father         Mesothelioma,  age 71     Diabetes Brother          age 43     Diabetes Type 1 Son      Diabetes Son      Parkinsonism Mother      Osteoporosis Mother      Alcoholism Brother      History   Drug Use No         Objective     BP 98/66 (BP Location: Left arm, Patient Position: Sitting, Cuff Size: Adult Regular)   Pulse 90   Resp 18   Ht 1.683 m (5' 6.25\")   Wt 54.7 kg (120 lb 9.6 oz)   SpO2 99%   BMI 19.32 kg/m      Physical Exam    GENERAL APPEARANCE: healthy, alert and no distress     EYES: EOMI, PERRL     HENT: ear canals and TM's normal and nose and mouth without ulcers or lesions     NECK: no adenopathy, no asymmetry, masses, or scars and thyroid normal to palpation     RESP: lungs clear to auscultation - no rales, rhonchi or wheezes     CV: regular rates and rhythm, normal S1 S2, no S3 or S4 and no murmur, click or rub     ABDOMEN:  soft, nontender, no HSM or masses and bowel sounds normal     MS: extremities normal- no gross deformities noted, no evidence of inflammation in joints, FROM in all " extremities.     SKIN: no suspicious lesions or rashes     NEURO: Normal strength and tone, sensory exam grossly normal, mentation intact and speech normal     PSYCH: mentation appears normal. and affect normal/bright     LYMPHATICS: No cervical adenopathy    A breast exam is performed and is negative for any masses, nipple discharge or axillary adenopathy    Recent Labs   Lab Test 12/15/21  0846 09/17/21  0748 03/15/21  0000 02/26/21  1326   HGB 14.0  --   --  13.2   PLT 89*  --   --  124*    142  --  141   POTASSIUM 4.3 4.1  --  3.7   CR 0.87 0.94   < > 0.92    < > = values in this interval not displayed.        Diagnostics:  Labs pending at this time.  Results will be reviewed when available.   No EKG required for low risk surgery (cataract, skin procedure, breast biopsy, etc).    Revised Cardiac Risk Index (RCRI):  The patient has the following serious cardiovascular risks for perioperative complications:   - No serious cardiac risks = 0 points     RCRI Interpretation: 0 points: Class I (very low risk - 0.4% complication rate)     Diagnosis Comments   1. Encounter for preventive care   patient is up-to-date on mammography/bone density/colon cancer screening/ophthalmologic, dental and Derm care   2. Personal history of immunosuppressive therapy   on low-dose Imuran-COVID booster today   3. NSIP (nonspecific interstitial pneumonia) (H)   stable lung disease   4. Atrophic vaginitis  estradiol (ESTRACE) 0.1 MG/GM vaginal cream    5. Coronary artery calcification seen on CT scan  Lipid panel reflex to direct LDL Fasting recent med change i.e. atorvastatin increased to 80 mg   6. High priority for 2019-nCoV vaccine  COVID-19,PF,PFIZER (12+ Yrs GRAY LABEL)    7. Platelet disorder (H)  CBC with Platelets & Differential -OF NOTE SHE HAS PLATELET CLUMPING WITH LABS>  PLATELETS ARE SAFE FOR SURGERY>   8. Other osteoporosis without current pathological fracture  Vitamin D Deficiency due for Prolia in March 9. High  risk medication use  Hepatic function panel    10. Yeast infection of the vagina  fluconazole (DIFLUCAN) 150 MG tablet    11. Impacted cerumen of right ear  REMOVE IMPACTED CERUMEN             Signed Electronically by: Symone Arroyo MD  Copy of this evaluation report is provided to requesting physician.

## 2022-03-02 LAB — DEPRECATED CALCIDIOL+CALCIFEROL SERPL-MC: 52 UG/L (ref 30–80)

## 2022-03-15 ENCOUNTER — TRANSFERRED RECORDS (OUTPATIENT)
Dept: HEALTH INFORMATION MANAGEMENT | Facility: CLINIC | Age: 62
End: 2022-03-15
Payer: COMMERCIAL

## 2022-03-15 LAB
ALT SERPL-CCNC: 16 IU/L (ref 5–35)
AST SERPL-CCNC: 35 U/L (ref 5–34)
CREATININE (EXTERNAL): 0.94 MG/DL (ref 0.5–1.3)
GFR ESTIMATED (EXTERNAL): 64.3 ML/MIN/1.73M2

## 2022-03-18 ENCOUNTER — ALLIED HEALTH/NURSE VISIT (OUTPATIENT)
Dept: FAMILY MEDICINE | Facility: CLINIC | Age: 62
End: 2022-03-18
Payer: COMMERCIAL

## 2022-03-18 DIAGNOSIS — M81.8 OTHER OSTEOPOROSIS WITHOUT CURRENT PATHOLOGICAL FRACTURE: Primary | ICD-10-CM

## 2022-03-18 PROCEDURE — 96372 THER/PROPH/DIAG INJ SC/IM: CPT | Performed by: INTERNAL MEDICINE

## 2022-03-18 NOTE — PROGRESS NOTES
"Prolia Injection Phone Screen      Screening questions have been asked 2-3 days prior to administration visit for Prolia. If any questions are answered with \"Yes,\" this phone encounter were will routed to ordering provider for further evaluation.     1.  When was the last injection?  9/17/21    2.  Has insurance for this injection been verified?  Yes    3.  Did you experience any new onset achiness or rashes that lasted for over a month with your previous Prolia injection?   No    4.  Do you have a fever over 101?F or a new deep cough that is unusual for you today? No    5.  Have you started any new medications in the last 6 months that you were told could affect your immune system? These may have been prescribed by oncologist, transplant, rheumatology, or dermatology.   No    6.  In the last 6 months have you have gastric bypass or parathyroid surgery?   No    7.  Do you plan dental work requiring drilling into the bone such as implants/extractions or oral surgery in the next 2-3 months?   No    8. Do you have new insurance since the last injection?    9. Have you received the Covid-19 vaccine? Yes  If No - Proceed with Prolia injection  If Yes - Date of vaccination 3/1/22. Will need to wait until 2 weeks after 2nd dose of Covid-19 vaccine before administering Prolia       Patient informed if symptoms discussed above present prior to their administration appointment, they are to notify clinic immediately.     ELIZA JENKINS LPN    Clinic Administered Medication Documentation          Prolia Documentation    Prior to injection, verified patient identity using patient's name and date of birth. Medication was administered. Please see MAR and medication order for additional information. Patient instructed to report any adverse reaction to staff immediately .    Indication: Prolia  (denosumab) is a prescription medicine used to treat osteoporosis in patients who:     Are at high risk for fracture, meaning patients " who have had a fracture related to osteoporosis, or who have multiple risk factors for fracture.    Cannot use another osteoporosis medicine or other osteoporosis medicines did not work well.    The timeline for early/late injections would be 4 weeks early and any time after the 6 month bettye. If a patient receives their injection late, then the subsequent injection would be 6 months from the date that they actually received the injection.    When was the last injection?  9/1721  Was the last injection at least 6 months ago? Yes  Has the prior authorization been completed?  Yes  Is there an active order (within the past 365 days) in the chart?  Yes  Patient denied having dental work involving the bone in the past 6 months?  Yes  Patient denies a plan to dental work involving the bone in the next 6 months? Yes    The following steps were completed to comply with the REMS program for Prolia:    Confirms that patient received education from RN or provider via the Medication Guide and Patient Counseling Chart, including the serious risks of Prolia  and the symptoms of each risk.    Told the patient to seek prompt medical attention if they have signs or symptoms of any of the serious risks, as described in the Medication Guide and Patient Counseling Chart that was reviewed between the patient and RN or LP.    Provided each patient a copy of the Medication Guide and Patient Brochure.      Was entire vial of medication used? Yes  Vial/Syringe: Syringe  Expiration Date:  05/31/24  Was the medication not being billed by clinic? No

## 2022-04-11 DIAGNOSIS — F19.982 INSOMNIA DUE TO DRUG (H): ICD-10-CM

## 2022-04-14 RX ORDER — TRAZODONE HYDROCHLORIDE 100 MG/1
TABLET ORAL
Qty: 360 TABLET | Refills: 3 | Status: SHIPPED | OUTPATIENT
Start: 2022-04-14 | End: 2022-09-07

## 2022-04-14 NOTE — TELEPHONE ENCOUNTER
"Last Written Prescription Date:  6/22/21  Last Fill Quantity: 360,  # refills: 1   Last office visit provider:  3/1/22     Requested Prescriptions   Pending Prescriptions Disp Refills     traZODone (DESYREL) 100 MG tablet [Pharmacy Med Name: TRAZODONE  MG TABS** 100 Tablet] 360 tablet 1     Sig: TAKE 3 TO 4 TABLETS (300  MG TOTAL) BY MOUTH AT BEDTIME.       Serotonin Modulators Passed - 4/11/2022 11:55 AM        Passed - Recent (12 mo) or future (30 days) visit within the authorizing provider's specialty     Patient has had an office visit with the authorizing provider or a provider within the authorizing providers department within the previous 12 mos or has a future within next 30 days. See \"Patient Info\" tab in inbasket, or \"Choose Columns\" in Meds & Orders section of the refill encounter.              Passed - Medication is active on med list        Passed - Patient is age 18 or older        Passed - No active pregnancy on record        Passed - No positive pregnancy test in past 12 months             Vinicius Velazquez RN 04/14/22 10:31 AM  "

## 2022-05-09 ENCOUNTER — TRANSFERRED RECORDS (OUTPATIENT)
Dept: HEALTH INFORMATION MANAGEMENT | Facility: CLINIC | Age: 62
End: 2022-05-09
Payer: COMMERCIAL

## 2022-05-10 ENCOUNTER — LAB (OUTPATIENT)
Dept: LAB | Facility: CLINIC | Age: 62
End: 2022-05-10
Payer: COMMERCIAL

## 2022-05-10 DIAGNOSIS — R93.1 AGATSTON CORONARY ARTERY CALCIUM SCORE LESS THAN 100: ICD-10-CM

## 2022-05-10 LAB
ALBUMIN SERPL-MCNC: 4.5 G/DL (ref 3.5–5)
ALP SERPL-CCNC: 59 U/L (ref 45–120)
ALT SERPL W P-5'-P-CCNC: 14 U/L (ref 0–45)
AST SERPL W P-5'-P-CCNC: 28 U/L (ref 0–40)
BILIRUB DIRECT SERPL-MCNC: 0.2 MG/DL
BILIRUB SERPL-MCNC: 0.6 MG/DL (ref 0–1)
CHOLEST SERPL-MCNC: 200 MG/DL
FASTING STATUS PATIENT QL REPORTED: YES
HDLC SERPL-MCNC: 101 MG/DL
LDLC SERPL CALC-MCNC: 85 MG/DL
PROT SERPL-MCNC: 7.8 G/DL (ref 6–8)
TRIGL SERPL-MCNC: 68 MG/DL

## 2022-05-10 PROCEDURE — 36415 COLL VENOUS BLD VENIPUNCTURE: CPT

## 2022-05-10 PROCEDURE — 80061 LIPID PANEL: CPT

## 2022-05-10 PROCEDURE — 80076 HEPATIC FUNCTION PANEL: CPT

## 2022-06-02 ENCOUNTER — TRANSFERRED RECORDS (OUTPATIENT)
Dept: HEALTH INFORMATION MANAGEMENT | Facility: CLINIC | Age: 62
End: 2022-06-02
Payer: COMMERCIAL

## 2022-07-01 ENCOUNTER — LAB (OUTPATIENT)
Dept: LAB | Facility: CLINIC | Age: 62
End: 2022-07-01
Attending: FAMILY MEDICINE
Payer: COMMERCIAL

## 2022-07-01 ENCOUNTER — LAB (OUTPATIENT)
Dept: LAB | Facility: CLINIC | Age: 62
End: 2022-07-01
Payer: COMMERCIAL

## 2022-07-01 DIAGNOSIS — Z20.822 CLOSE EXPOSURE TO 2019 NOVEL CORONAVIRUS: ICD-10-CM

## 2022-07-01 DIAGNOSIS — E78.00 HYPERCHOLESTEROLEMIA: ICD-10-CM

## 2022-07-01 DIAGNOSIS — I25.10 CORONARY ARTERY CALCIFICATION SEEN ON CT SCAN: ICD-10-CM

## 2022-07-01 LAB
ALBUMIN SERPL BCG-MCNC: 4.8 G/DL (ref 3.5–5.2)
ALP SERPL-CCNC: 59 U/L (ref 35–104)
ALT SERPL W P-5'-P-CCNC: 25 U/L (ref 10–35)
AST SERPL W P-5'-P-CCNC: 37 U/L (ref 10–35)
BILIRUB DIRECT SERPL-MCNC: <0.2 MG/DL (ref 0–0.3)
BILIRUB SERPL-MCNC: 0.3 MG/DL
CHOLEST SERPL-MCNC: 183 MG/DL
HDLC SERPL-MCNC: 107 MG/DL
LDLC SERPL CALC-MCNC: 67 MG/DL
NONHDLC SERPL-MCNC: 76 MG/DL
PROT SERPL-MCNC: 7.1 G/DL (ref 6.4–8.3)
TRIGL SERPL-MCNC: 45 MG/DL

## 2022-07-01 PROCEDURE — 36415 COLL VENOUS BLD VENIPUNCTURE: CPT

## 2022-07-01 PROCEDURE — U0005 INFEC AGEN DETEC AMPLI PROBE: HCPCS

## 2022-07-01 PROCEDURE — 80076 HEPATIC FUNCTION PANEL: CPT

## 2022-07-01 PROCEDURE — U0003 INFECTIOUS AGENT DETECTION BY NUCLEIC ACID (DNA OR RNA); SEVERE ACUTE RESPIRATORY SYNDROME CORONAVIRUS 2 (SARS-COV-2) (CORONAVIRUS DISEASE [COVID-19]), AMPLIFIED PROBE TECHNIQUE, MAKING USE OF HIGH THROUGHPUT TECHNOLOGIES AS DESCRIBED BY CMS-2020-01-R: HCPCS

## 2022-07-01 PROCEDURE — 80061 LIPID PANEL: CPT

## 2022-07-02 LAB — SARS-COV-2 RNA RESP QL NAA+PROBE: NEGATIVE

## 2022-08-04 ENCOUNTER — TELEPHONE (OUTPATIENT)
Dept: INTERNAL MEDICINE | Facility: CLINIC | Age: 62
End: 2022-08-04

## 2022-08-04 DIAGNOSIS — Z92.29 PERSONAL HISTORY OF OTHER DRUG THERAPY: ICD-10-CM

## 2022-08-04 DIAGNOSIS — M81.0 SENILE OSTEOPOROSIS: Primary | ICD-10-CM

## 2022-08-04 NOTE — TELEPHONE ENCOUNTER
----- Message from Alice Lala sent at 2022  8:45 AM CDT -----  Regarding: CAM order   Hello,    This patient is scheduled for prolia on 22. Their med order  on  3/18/22. Please enter a new CAM order.     Thanks!  Alice Lala     [facility]  HE Clinically Administered Medications

## 2022-08-29 ENCOUNTER — TRANSFERRED RECORDS (OUTPATIENT)
Dept: HEALTH INFORMATION MANAGEMENT | Facility: CLINIC | Age: 62
End: 2022-08-29

## 2022-09-06 ENCOUNTER — OFFICE VISIT (OUTPATIENT)
Dept: INTERNAL MEDICINE | Facility: CLINIC | Age: 62
End: 2022-09-06
Payer: COMMERCIAL

## 2022-09-06 VITALS
TEMPERATURE: 97 F | BODY MASS INDEX: 19.25 KG/M2 | WEIGHT: 119.8 LBS | OXYGEN SATURATION: 98 % | RESPIRATION RATE: 12 BRPM | HEART RATE: 84 BPM | HEIGHT: 66 IN | SYSTOLIC BLOOD PRESSURE: 110 MMHG | DIASTOLIC BLOOD PRESSURE: 66 MMHG

## 2022-09-06 DIAGNOSIS — R25.2 LEG CRAMPS: ICD-10-CM

## 2022-09-06 DIAGNOSIS — I25.10 CORONARY ARTERY CALCIFICATION SEEN ON CT SCAN: Primary | ICD-10-CM

## 2022-09-06 DIAGNOSIS — M35.00 SICCA SYNDROME (H): ICD-10-CM

## 2022-09-06 DIAGNOSIS — J84.89 NSIP (NONSPECIFIC INTERSTITIAL PNEUMONIA) (H): ICD-10-CM

## 2022-09-06 DIAGNOSIS — M81.8 OTHER OSTEOPOROSIS WITHOUT CURRENT PATHOLOGICAL FRACTURE: ICD-10-CM

## 2022-09-06 LAB
ANION GAP SERPL CALCULATED.3IONS-SCNC: 10 MMOL/L (ref 7–15)
BUN SERPL-MCNC: 17.1 MG/DL (ref 8–23)
CALCIUM SERPL-MCNC: 9.9 MG/DL (ref 8.8–10.2)
CHLORIDE SERPL-SCNC: 98 MMOL/L (ref 98–107)
CHOLEST SERPL-MCNC: 262 MG/DL
CREAT SERPL-MCNC: 0.95 MG/DL (ref 0.51–0.95)
DEPRECATED HCO3 PLAS-SCNC: 31 MMOL/L (ref 22–29)
GFR SERPL CREATININE-BSD FRML MDRD: 67 ML/MIN/1.73M2
GLUCOSE SERPL-MCNC: 95 MG/DL (ref 70–99)
HDLC SERPL-MCNC: 102 MG/DL
LDLC SERPL CALC-MCNC: 148 MG/DL
MAGNESIUM SERPL-MCNC: 2 MG/DL (ref 1.7–2.3)
NONHDLC SERPL-MCNC: 160 MG/DL
POTASSIUM SERPL-SCNC: 4 MMOL/L (ref 3.4–5.3)
SODIUM SERPL-SCNC: 139 MMOL/L (ref 136–145)
TRIGL SERPL-MCNC: 59 MG/DL

## 2022-09-06 PROCEDURE — 80048 BASIC METABOLIC PNL TOTAL CA: CPT | Performed by: INTERNAL MEDICINE

## 2022-09-06 PROCEDURE — 99214 OFFICE O/P EST MOD 30 MIN: CPT | Performed by: INTERNAL MEDICINE

## 2022-09-06 PROCEDURE — 80061 LIPID PANEL: CPT | Performed by: INTERNAL MEDICINE

## 2022-09-06 PROCEDURE — 83735 ASSAY OF MAGNESIUM: CPT | Performed by: INTERNAL MEDICINE

## 2022-09-06 PROCEDURE — 82306 VITAMIN D 25 HYDROXY: CPT | Performed by: INTERNAL MEDICINE

## 2022-09-06 PROCEDURE — 36415 COLL VENOUS BLD VENIPUNCTURE: CPT | Performed by: INTERNAL MEDICINE

## 2022-09-06 RX ORDER — DIMENHYDRINATE 50 MG
1 TABLET ORAL DAILY
COMMUNITY
Start: 2022-04-09

## 2022-09-06 RX ORDER — DILTIAZEM HYDROCHLORIDE 60 MG/1
TABLET, FILM COATED ORAL
COMMUNITY
Start: 2022-08-03

## 2022-09-06 NOTE — PROGRESS NOTES
[facility] Follow Up Note    Assessment/Plan:  1. Coronary artery calcification seen on CT scan/known coronary calcifications  LDL refractory to a atorvastatin at 80 mg.  Goal for LDL cholesterol approximately 70.  At goal with Crestor but significant leg cramps.  Recommendation: Would like to switch to PC SK 9 inhibitor such as Repatha or Praluent.  May need update on coronary calcium scan.  We will contact her after I make my inquiries  - CT Coronary Calcium Scan; Future    2. Sjogren's Syndrome  Stable-followed by rheumatology    3. NSIP (nonspecific interstitial pneumonia) (H)  Felt secondary to autoimmune disease/pulmonary functions soon- lungs stable    4. Other osteoporosis without current pathological fracture  On Prolia  - Basic metabolic panel; Future  - Magnesium; Future  - Vitamin D Deficiency; Future    5. Leg cramps  Seemingly associated with Crestor.  Additionally, recommend 64 ounces of fluid.  We will check electrolytes and magnesium.  - Basic metabolic panel; Future  - Magnesium; Future      Follow-up for physical    Symone Arroyo MD, MD    Chief Complaint:  Chief Complaint   Patient presents with     Medication Follow-up     Alternative statin - leg/feet cramps on Rosuvastatin       History of Present Illness:  Radha is a 62 year old female who is here today for follow-up and discussion of cholesterol medications given her history of coronary artery calcifications and hyperlipidemia.  Of note, she has seen cardiology at [facility] in the past.  Her LDL recommendation is for an LDL less than 70.  Cardiology records in care everywhere.  Negative stress test.    She contacted me with symptoms of intense leg cramps that improved with cessation of the Crestor.  She currently has been on no lipid-lowering therapies.  Of note, her symptoms improved although not entirely with cessation of the Crestor.  She describes intense nocturnal leg cramps.    She has traveled the summer.  She  has been extremely active.  She has not had any difficulties with ambulation.  She has no new symptoms.    Review of Systems:  A comprehensive review of systems was performed and was otherwise negative    PFSH:  Social History:  with adult children.  Non-smoker  History   Smoking Status     Never Smoker   Smokeless Tobacco     Never Used       Past History:   Current Outpatient Medications   Medication Sig Dispense Refill     aspirin 81 MG EC tablet Take 81 mg by mouth daily       azaTHIOprine (IMURAN) 50 mg tablet Take 1 tablet by mouth daily        calcium carbonate (OS-KOLE) 600 mg calcium (1,500 mg) tablet [CALCIUM CARBONATE (OS-KOLE) 600 MG CALCIUM (1,500 MG) TABLET] Take 600 mg by mouth 2 (two) times a day with meals.       cetirizine (ZYRTEC) 10 MG tablet [CETIRIZINE (ZYRTEC) 10 MG TABLET] Take 10 mg by mouth daily.       cyclobenzaprine (FLEXERIL) 10 MG tablet TAKE 1 TABLET (10 MG TOTAL) BY MOUTH DAILY AS NEEDED FOR MUSCLE SPASMS. 90 tablet 0     estradiol (ESTRACE) 0.1 MG/GM vaginal cream INSERT 1 GRAM INTO THE VAGINA DAILY FOR ONE WEEK, THEN USE 2-3 TIMES PER WEEK. 42.5 g 3     famotidine (PEPCID) 20 MG tablet Take 20 mg by mouth daily       Flaxseed, Linseed, (FLAX SEED OIL) 1000 MG capsule        MULTIVITAMIN ORAL [MULTIVITAMIN ORAL]        potassium chloride ER (KLOR-CON M) 20 MEQ CR tablet TAKE 1 TABLET (20 MEQ TOTAL) BY MOUTH DAILY. 90 tablet 3     QUEtiapine (SEROQUEL) 100 MG tablet [QUETIAPINE (SEROQUEL) 100 MG TABLET] TAKE 2 TABLETS (200 MG TOTAL) BY MOUTH AT BEDTIME 180 tablet 3     spironolactone-hydrochlorothiazide (ALDACTAZIDE) 25-25 mg tablet [SPIRONOLACTONE-HYDROCHLOROTHIAZIDE (ALDACTAZIDE) 25-25 MG TABLET] TAKE 1.5 TABLETS BY MOUTH DAILY. 135 tablet 11     traZODone (DESYREL) 100 MG tablet TAKE 3 TO 4 TABLETS (300  MG TOTAL) BY MOUTH AT BEDTIME. 360 tablet 3     tretinoin, emollient, (RENOVA) 0.02 % Crea [TRETINOIN, EMOLLIENT, (RENOVA) 0.02 % CREA] Apply topically daily. Apply  "sparingly to affected areas       VITAMIN B COMPLEX ORAL [VITAMIN B COMPLEX ORAL] Take 1 capsule by mouth daily.       SYMBICORT 80-4.5 MCG/ACT Inhaler INHALE 2 PUFFS BY MOUTH 2 TIMES DAILY.         Family History:     Physical Exam:  General Appearance:   She appears quite well and in no acute distress  Vitals:    09/06/22 0813   BP: 110/66   BP Location: Left arm   Patient Position: Sitting   Cuff Size: Adult Regular   Pulse: 84   Resp: 12   Temp: 97  F (36.1  C)   TempSrc: Tympanic   SpO2: 98%   Weight: 54.3 kg (119 lb 12.8 oz)   Height: 1.682 m (5' 6.24\")     Wt Readings from Last 3 Encounters:   09/06/22 54.3 kg (119 lb 12.8 oz)   03/01/22 54.7 kg (120 lb 9.6 oz)   12/15/21 55.3 kg (122 lb)     Body mass index is 19.2 kg/m .        Data Review:    Analysis and Summary of Old Records (2): Reviewed my chart notes and cardiology notes    Records Requested (1):       Other History Summarized (from other people in the room) (2):     Radiology Tests Summarized (XRAY/CT/MRI/DXA) (1):     Labs Reviewed (1): Ordered    Medicine Tests Reviewed (EKG/ECHO/COLONOSCOPY/EGD) (1):     Independent Review of EKG or X-RAY (2):       Answers for HPI/ROS submitted by the patient on 8/30/2022  What is the reason for your visit today? : Medication check. New treatment to reduce LDL since I don't tolerate Crestor.  How many servings of fruits and vegetables do you eat daily?: 4 or more  On average, how many sweetened beverages do you drink each day (Examples: soda, juice, sweet tea, etc.  Do NOT count diet or artificially sweetened beverages)?: 0  How many minutes a day do you exercise enough to make your heart beat faster?: 30 to 60  How many days a week do you exercise enough to make your heart beat faster?: 5  How many days per week do you miss taking your medication?: 0      "

## 2022-09-07 ENCOUNTER — VIRTUAL VISIT (OUTPATIENT)
Dept: PHARMACY | Facility: CLINIC | Age: 62
End: 2022-09-07

## 2022-09-07 DIAGNOSIS — M81.8 OTHER OSTEOPOROSIS WITHOUT CURRENT PATHOLOGICAL FRACTURE: ICD-10-CM

## 2022-09-07 DIAGNOSIS — K21.9 GASTROESOPHAGEAL REFLUX DISEASE, UNSPECIFIED WHETHER ESOPHAGITIS PRESENT: ICD-10-CM

## 2022-09-07 DIAGNOSIS — M35.00 SICCA SYNDROME (H): ICD-10-CM

## 2022-09-07 DIAGNOSIS — J30.89 NON-SEASONAL ALLERGIC RHINITIS, UNSPECIFIED TRIGGER: ICD-10-CM

## 2022-09-07 DIAGNOSIS — R93.1 AGATSTON CORONARY ARTERY CALCIUM SCORE LESS THAN 100: Primary | ICD-10-CM

## 2022-09-07 DIAGNOSIS — R25.2 LEG CRAMPING: ICD-10-CM

## 2022-09-07 DIAGNOSIS — I25.10 CORONARY ARTERY CALCIFICATION SEEN ON CT SCAN: ICD-10-CM

## 2022-09-07 DIAGNOSIS — J84.89 NSIP (NONSPECIFIC INTERSTITIAL PNEUMONIA) (H): ICD-10-CM

## 2022-09-07 DIAGNOSIS — F51.01 PRIMARY INSOMNIA: ICD-10-CM

## 2022-09-07 LAB — DEPRECATED CALCIDIOL+CALCIFEROL SERPL-MC: 67 UG/L (ref 20–75)

## 2022-09-07 PROCEDURE — 99605 MTMS BY PHARM NP 15 MIN: CPT | Performed by: PHARMACIST

## 2022-09-07 PROCEDURE — 99607 MTMS BY PHARM ADDL 15 MIN: CPT | Performed by: PHARMACIST

## 2022-09-07 RX ORDER — SPIRONOLACTONE AND HYDROCHLOROTHIAZIDE 25; 25 MG/1; MG/1
1 TABLET ORAL DAILY
Qty: 135 TABLET | Refills: 11 | COMMUNITY
Start: 2022-09-07 | End: 2022-10-03

## 2022-09-07 RX ORDER — TRAZODONE HYDROCHLORIDE 100 MG/1
200 TABLET ORAL AT BEDTIME
Qty: 360 TABLET | Refills: 3 | COMMUNITY
Start: 2022-09-07 | End: 2023-10-03

## 2022-09-07 RX ORDER — QUETIAPINE FUMARATE 100 MG/1
100 TABLET, FILM COATED ORAL AT BEDTIME
Qty: 180 TABLET | Refills: 3 | COMMUNITY
Start: 2022-09-07 | End: 2022-12-02

## 2022-09-07 NOTE — PROGRESS NOTES
Medication Therapy Management (MTM) Encounter    ASSESSMENT:                            Medication Adherence/Access: No issues identified    1. Agatston coronary artery calcium score less than 100  2. Coronary artery calcification seen on CT scan  Lengthy discussion regarding benefits versus risks of treatment and intensity of treatment needed for her cholesterol levels.  Identified that her previous coronary artery calcium score was 7, with an estimated ASCVD risk score less than 7.5%.  During previous evaluation with cardiology and per rheumatology they would favor treating her cholesterol.  Acknowledged previous intolerance to higher doses of rosuvastatin, adequate tolerance to atorvastatin 80 however was not able to decrease LDL to target goal at that time, and she has not yet tried ezetimibe.  At this time she does not have an indication for PCSK9 inhibitor, rather may benefit from resuming previously tolerated statin.  Recommend to resume rosuvastatin 20 mg daily.  If tolerating it may consider intensifying treatment with the addition of ezetimibe 10 mg daily.  In the meantime, PCP would like her to update her coronary artery CT scan, she is agreeable.    3. NSIP (nonspecific interstitial pneumonia) (H)  Managed by pulmonology, with upcoming follow-up later this week.  I recommended continue ICS/LABA as directed.    4. Sjogren's Syndrome  Managed by rheumatology, recommend to continue azathioprine.    5. Other osteoporosis without current pathological fracture  Managed by Dr. Arroyo, stable on Prolia with adequate calcium and vitamin D.  Awaiting vitamin D level, adjust dose pending result.    6. Leg cramping  Ongoing leg cramping despite normal magnesium, encouraged to initiate magnesium supplement per PCP.    7. Primary insomnia  Stable per patient report, tolerating without adverse effects.  Recommend to continue.    8. Gastroesophageal reflux disease, unspecified whether esophagitis present  Stable with  daily H2 blocke coronary artery r, recommend to continue.    9. Non-seasonal allergic rhinitis, unspecified trigger  Stable on daily antihistamine, recommend to continue.    PLAN:                            1. Update CAC per Dr. Arroyo  2. Resume rosuvastatin 20mg +/- ezetimibe     Follow-up: Return in about 2 weeks (around 9/21/2022) for Follow up, with me, using a phone visit.    SUBJECTIVE/OBJECTIVE:                          Radha Morrison is a 62 year old female called for an initial visit. She was referred to me from Dr. Arroyo.      Reason for visit: Medication management initial.    Allergies/ADRs: Reviewed in chart  Past Medical History: Reviewed in chart  Tobacco: She reports that she has never smoked. She has never used smokeless tobacco.  Alcohol: Occasionally    Medication Adherence/Access: no issues reported    Coronary artery calcification: She was referred by PCP to discuss alternative medications due to statin intolerance.  In 2017 that she had a coronary artery calcium CT completed, thereafter she was referred to cardiology.  As a result she has been on statin therapy since that time.  Recommendations for LDL goal has varied between primary care and cardiology, who prefers to have her LDL less than 70.  Reviewed cardiology notes and was not able to see specific: Recommended however this was given to her verbally.  She had previously been on atorvastatin 80 mg daily, tolerating without adverse effects however this did not effectively decrease her LDL to goal.  She was subsequently switched to Crestor 20 mg which she tolerated, but was further titrated to 40 mg daily to help further decrease LDL.  After about 6 weeks on rosuvastatin 40 mg she started cramping.  She has now been off of rosuvastatin for about 4 weeks, and her symptoms have largely subsided.  She does have intermittent cramping from time to time.  She did have a stress test which was not positive for inducible disease.  She continues on  low-dose aspirin daily.  It was difficult to find her coronary CT, however we were able to find this with a score of 7.  At that time it was recommended that she consider rechecking in 5 years to evaluate for change.  Based on this report it was deemed that her risk of future cardiac event was low.  Coronary artery calcifications were also noted on chest CT as completed by her pulmonologist.  Her rheumatologist would like her to be on a statin due to connective tissue disease which may increase her risk of cardiac events.  Her HDL at this time is slightly too high for the validated pooled risk calculator, however if decreased down to 100, her estimated 10-year risk score would be about 3.4%.  Appointment today was also to discuss potential use of PCSK9 inhibitor due to statin intolerance.  She has never tried ezetimibe.  LDL Cholesterol Calculated   Date Value Ref Range Status   09/06/2022 148 (H) <=100 mg/dL Final     ILD: Follows with her pulmonologist Dr. Berry, she has a follow-up later this week.  Believes she gets annual CT scans for monitoring of her lung disease.  She continues on Symbicort twice daily.  Denies signs or symptoms of adverse effects.    Sjogren's syndrome: Following with her rheumatologist, Dr. Arce, continues on azathioprine.    Osteoporosis: Treated with Prolia every 6 months.  She continues on it calcium and vitamin D.  Awaiting updated vitamin D level as drawn yesterday.  To prevent urinary losses of calcium she continues on spironolactone/hydrochlorothiazide.  Tolerating without adverse effects.  She exercises 4-5 times weekly to remain active.  No results found for: PTHI   Lab Results   Component Value Date    KOLE 9.9 09/06/2022      Lab Results   Component Value Date    VITDT 67 09/06/2022    VITDT 52 03/01/2022    VITDT 48 09/17/2021     Leg cramping: Despite normal magnesium, per my chart PCP recommended starting daily magnesium to see if this improves cramping  symptoms.    Insomnia: Longstanding insomnia, continues on quetiapine and trazodone which she finds effective.  She has slowly been tapering down her doses to minimize medication.  Denies signs or symptoms of adverse effects.    GERD: Continues on famotidine daily in the evening, this is largely effective at controlling symptoms    Allergies: She is on cetirizine daily all year-round, she does not do so she is excessively congested.      Today's Vitals: There were no vitals taken for this visit.  ----------------    I spent 45 minutes with this patient today. All changes were made via collaborative practice agreement with Symone Arroyo MD. A copy of the visit note was provided to the patient's provider(s).    The patient was sent via [employer] a summary of these recommendations.     Ronnie Christine, PharmD, BCACP  Medication Management (MTM) Pharmacist  [facility]    Telemedicine Visit Details  Type of service:  Telephone visit  Start Time: 1PM  End Time: 1:45PM  Originating Location (patient location): Home  Distant Location (provider location):  [facility]     Medication Therapy Recommendations  [facility] coronary artery calcium score less than 100    Rationale: Untreated condition - Needs additional medication therapy - Indication   Recommendation: Start Medication - rosuvastatin 20 MG tablet   Status: Accepted per CPA

## 2022-09-07 NOTE — Clinical Note
She would also like to add zetia, hoping to treat to goal per cardiology. I encouraged her to start rosuvastatin 20mg first, and after we know she tolerates that, perhaps two months, consider adding zetia. Thoughts? Raissa

## 2022-09-08 NOTE — PATIENT INSTRUCTIONS
PLAN:                            1. Update CAC per Dr. Arroyo  2. Resume rosuvastatin 20mg +/- ezetimibe     Follow-up: Return in about 2 weeks (around 9/21/2022) for Follow up, with me, using a phone visit.

## 2022-09-09 RX ORDER — ROSUVASTATIN CALCIUM 20 MG/1
20 TABLET, COATED ORAL DAILY
Qty: 90 TABLET | Refills: 3 | Status: SHIPPED | OUTPATIENT
Start: 2022-09-09 | End: 2022-09-23 | Stop reason: ALTCHOICE

## 2022-09-19 ENCOUNTER — HOSPITAL ENCOUNTER (OUTPATIENT)
Dept: CT IMAGING | Facility: CLINIC | Age: 62
Discharge: HOME | End: 2022-09-19
Attending: INTERNAL MEDICINE | Admitting: INTERNAL MEDICINE
Payer: COMMERCIAL

## 2022-09-19 ENCOUNTER — ALLIED HEALTH/NURSE VISIT (OUTPATIENT)
Dept: FAMILY MEDICINE | Facility: CLINIC | Age: 62
End: 2022-09-19
Payer: COMMERCIAL

## 2022-09-19 DIAGNOSIS — I25.10 CORONARY ARTERY CALCIFICATION SEEN ON CT SCAN: ICD-10-CM

## 2022-09-19 DIAGNOSIS — M81.8 OTHER OSTEOPOROSIS WITHOUT CURRENT PATHOLOGICAL FRACTURE: Primary | ICD-10-CM

## 2022-09-19 LAB
CV CALCIUM SCORE AGATSTON LM: 0
CV CALCIUM SCORING AGATSON LAD: 71
CV CALCIUM SCORING AGATSTON CX: 0
CV CALCIUM SCORING AGATSTON RCA: 0
CV CALCIUM SCORING AGATSTON TOTAL: 71

## 2022-09-19 PROCEDURE — 75571 CT HRT W/O DYE W/CA TEST: CPT | Mod: 26 | Performed by: INTERNAL MEDICINE

## 2022-09-19 PROCEDURE — 99207 PR NO CHARGE NURSE ONLY: CPT

## 2022-09-19 PROCEDURE — 75571 CT HRT W/O DYE W/CA TEST: CPT

## 2022-09-19 PROCEDURE — 96372 THER/PROPH/DIAG INJ SC/IM: CPT | Performed by: INTERNAL MEDICINE

## 2022-09-19 NOTE — PROGRESS NOTES
Indication: Prolia  (denosumab) is a prescription medicine used to treat osteoporosis in patients who:     Are at high risk for fracture, meaning patients who have had a fracture related to osteoporosis, or who have multiple risk factors for fracture     Cannot use another osteoporosis medicine or other osteoporosis medicines did not work well   The timeline for early/late injections would be 4 weeks early and any time after the 6 month bettye. If a patient receives their injection late, then the subsequent injection would be 6 months from the date that they actually received the injection    1.  When was the last injection?  3/18/2022  2.  Did they check with their insurance for this calendar year?  Yes  3.  Is there an order in the chart?  Yes  4.  Has the patient had dental work involving the bone in the past month or will have work in the next 6 months?  No  5.  Did you have the patient wait 15 minutes after the injection?  Yes  6.  Remember to use .injection under the medication notes    The following steps were completed to comply with the REMS program for Prolia:    Reviewed information in the Medication Guide and Patient Counseling Chart, including the serious risks of Prolia  and the symptoms of each risk.    Advised patient to seek prompt medical attention if they have signs or symptoms of any of the serious risks.  Provided each patient a copy of the Medication Guide and Patient Brochure.

## 2022-09-30 ENCOUNTER — TRANSFERRED RECORDS (OUTPATIENT)
Dept: HEALTH INFORMATION MANAGEMENT | Facility: CLINIC | Age: 62
End: 2022-09-30

## 2022-10-01 DIAGNOSIS — E87.6 HYPOKALEMIA: Primary | ICD-10-CM

## 2022-10-02 ENCOUNTER — HEALTH MAINTENANCE LETTER (OUTPATIENT)
Age: 62
End: 2022-10-02

## 2022-10-02 NOTE — TELEPHONE ENCOUNTER
"Routing refill request to provider for review/approval because:  Refill rx sig does not match med list    Last Written Prescription Date:  9/7/22  Last Fill Quantity: 135,  # refills: 11   Last office visit provider:  9/6/22     Requested Prescriptions   Pending Prescriptions Disp Refills     spironolactone-HCTZ (ALDACTAZIDE) 25-25 MG tablet [Pharmacy Med Name: SPIRONOLACT/HCTZ 25/25 ^^ 25-25 Tablet] 135 tablet 11     Sig: TAKE ONE AND A HALF TABLETS BY MOUTH DAILY.       Diuretics (Including Combos) Protocol Passed - 10/1/2022  2:24 PM        Passed - Blood pressure under 140/90 in past 12 months     BP Readings from Last 3 Encounters:   09/06/22 110/66   03/01/22 98/66   12/15/21 112/62                 Passed - Recent (12 mo) or future (30 days) visit within the authorizing provider's specialty     Patient has had an office visit with the authorizing provider or a provider within the authorizing providers department within the previous 12 mos or has a future within next 30 days. See \"Patient Info\" tab in inbasket, or \"Choose Columns\" in Meds & Orders section of the refill encounter.              Passed - Medication is active on med list        Passed - Patient is age 18 or older        Passed - No active pregancy on record        Passed - Normal serum creatinine on file in past 12 months     Recent Labs   Lab Test 09/06/22  0913   CR 0.95              Passed - Normal serum potassium on file in past 12 months     Recent Labs   Lab Test 09/06/22  0913   POTASSIUM 4.0                    Passed - Normal serum sodium on file in past 12 months     Recent Labs   Lab Test 09/06/22  0913                 Passed - No positive pregnancy test in past 12 months             Anais Rhodes, RN 10/02/22 6:32 PM  "

## 2022-10-03 RX ORDER — SPIRONOLACTONE AND HYDROCHLOROTHIAZIDE 25; 25 MG/1; MG/1
TABLET ORAL
Qty: 135 TABLET | Refills: 11 | Status: SHIPPED | OUTPATIENT
Start: 2022-10-03 | End: 2023-10-03

## 2022-10-12 ENCOUNTER — TELEPHONE (OUTPATIENT)
Dept: INTERNAL MEDICINE | Facility: CLINIC | Age: 62
End: 2022-10-12

## 2022-10-13 NOTE — TELEPHONE ENCOUNTER
Prior Authorization Approval    Authorization Effective Date: 9/13/2022  Authorization Expiration Date: 10/13/2023  Medication: Repatha SureClick 140MG/ML auto-injectors    Approved Dose/Quantity:   Reference #:     Insurance Company: [employer] - Phone [phone] Fax [phone]  Expected CoPay:       CoPay Card Available:      Foundation Assistance Needed:    Which Pharmacy is filling the prescription (Not needed for infusion/clinic administered): [facility] - [place], [place] - [address]  Pharmacy Notified: yes   Patient Notified:  yes

## 2022-10-13 NOTE — TELEPHONE ENCOUNTER
Central Prior Authorization Team   Phone: [phone]    PA Initiation    Medication: Repatha SureClick 140MG/ML auto-injectors    Insurance Company: [employer] - Phone [phone] Fax [phone]  Pharmacy Filling the Rx: [facility] - [place], [place] - [facility] [address]  Filling Pharmacy Phone: [phone]  Filling Pharmacy Fax:    Start Date: 10/13/2022

## 2022-10-14 ENCOUNTER — TELEPHONE (OUTPATIENT)
Dept: INTERNAL MEDICINE | Facility: CLINIC | Age: 62
End: 2022-10-14

## 2022-11-03 DIAGNOSIS — E87.6 HYPOKALEMIA: ICD-10-CM

## 2022-11-05 NOTE — TELEPHONE ENCOUNTER
"Drug interaction warning    Last Written Prescription Date:  2/25/22  Last Fill Quantity: 90,  # refills: 3   Last office visit provider:  9/6/22     Requested Prescriptions   Pending Prescriptions Disp Refills     potassium chloride ER (KLOR-CON M) 20 MEQ CR tablet [Pharmacy Med Name: POTASSIUM CHL ER 20MEQ* 20 Tablet] 90 tablet 3     Sig: TAKE 1 TABLET (20 MEQ TOTAL) BY MOUTH DAILY.       Potassium Supplements Protocol Passed - 11/3/2022  2:45 PM        Passed - Recent (12 mo) or future (30 days) visit within the authorizing provider's department     Patient has had an office visit with the authorizing provider or a provider within the authorizing providers department within the previous 12 mos or has a future within next 30 days. See \"Patient Info\" tab in inbasket, or \"Choose Columns\" in Meds & Orders section of the refill encounter.              Passed - Medication is active on med list        Passed - Patient is age 18 or older        Passed - Normal serum potassium in past 12 months     Recent Labs   Lab Test 09/06/22  0913   POTASSIUM 4.0                         Anais Rhodes RN 11/05/22 12:09 PM  "

## 2022-11-06 RX ORDER — POTASSIUM CHLORIDE 1500 MG/1
TABLET, EXTENDED RELEASE ORAL
Qty: 90 TABLET | Refills: 2 | Status: SHIPPED | OUTPATIENT
Start: 2022-11-06 | End: 2022-12-02

## 2022-11-07 ENCOUNTER — LAB REQUISITION (OUTPATIENT)
Dept: LAB | Facility: CLINIC | Age: 62
End: 2022-11-07
Payer: COMMERCIAL

## 2022-11-07 DIAGNOSIS — Z12.4 ENCOUNTER FOR SCREENING FOR MALIGNANT NEOPLASM OF CERVIX: ICD-10-CM

## 2022-11-07 DIAGNOSIS — N95.0 POSTMENOPAUSAL BLEEDING: ICD-10-CM

## 2022-11-07 PROCEDURE — 88305 TISSUE EXAM BY PATHOLOGIST: CPT | Mod: TC,ORL | Performed by: OBSTETRICS & GYNECOLOGY

## 2022-11-07 PROCEDURE — 88305 TISSUE EXAM BY PATHOLOGIST: CPT | Mod: 26 | Performed by: PATHOLOGY

## 2022-11-07 PROCEDURE — G0145 SCR C/V CYTO,THINLAYER,RESCR: HCPCS | Mod: ORL | Performed by: OBSTETRICS & GYNECOLOGY

## 2022-11-09 LAB
BKR LAB AP GYN ADEQUACY: NORMAL
BKR LAB AP GYN INTERPRETATION: NORMAL
BKR LAB AP HPV REFLEX: NORMAL
BKR LAB AP LMP: NORMAL
BKR LAB AP PREVIOUS ABNL DX: NORMAL
BKR LAB AP PREVIOUS ABNORMAL: NORMAL
PATH REPORT.COMMENTS IMP SPEC: NORMAL
PATH REPORT.COMMENTS IMP SPEC: NORMAL
PATH REPORT.RELEVANT HX SPEC: NORMAL

## 2022-11-10 LAB
PATH REPORT.COMMENTS IMP SPEC: NORMAL
PATH REPORT.COMMENTS IMP SPEC: NORMAL
PATH REPORT.FINAL DX SPEC: NORMAL
PATH REPORT.GROSS SPEC: NORMAL
PATH REPORT.MICROSCOPIC SPEC OTHER STN: NORMAL
PATH REPORT.RELEVANT HX SPEC: NORMAL
PHOTO IMAGE: NORMAL

## 2022-11-22 DIAGNOSIS — N95.2 ATROPHIC VAGINITIS: ICD-10-CM

## 2022-11-22 DIAGNOSIS — F51.01 PRIMARY INSOMNIA: ICD-10-CM

## 2022-11-23 RX ORDER — ESTRADIOL 0.1 MG/G
CREAM VAGINAL
Qty: 42.5 G | Refills: 3 | OUTPATIENT
Start: 2022-11-23

## 2022-11-23 RX ORDER — QUETIAPINE FUMARATE 100 MG/1
TABLET, FILM COATED ORAL
Qty: 180 TABLET | Refills: 3 | OUTPATIENT
Start: 2022-11-23

## 2022-11-30 ENCOUNTER — LAB (OUTPATIENT)
Dept: LAB | Facility: CLINIC | Age: 62
End: 2022-11-30
Payer: COMMERCIAL

## 2022-11-30 DIAGNOSIS — I25.10 CORONARY ARTERY CALCIFICATION SEEN ON CT SCAN: ICD-10-CM

## 2022-11-30 LAB
ALT SERPL W P-5'-P-CCNC: 16 U/L (ref 10–35)
CHOLEST SERPL-MCNC: 197 MG/DL
HDLC SERPL-MCNC: 104 MG/DL
LDLC SERPL CALC-MCNC: 81 MG/DL
NONHDLC SERPL-MCNC: 93 MG/DL
TRIGL SERPL-MCNC: 62 MG/DL

## 2022-11-30 PROCEDURE — 80061 LIPID PANEL: CPT

## 2022-11-30 PROCEDURE — 84460 ALANINE AMINO (ALT) (SGPT): CPT

## 2022-11-30 PROCEDURE — 36415 COLL VENOUS BLD VENIPUNCTURE: CPT

## 2022-12-01 ENCOUNTER — TRANSFERRED RECORDS (OUTPATIENT)
Dept: HEALTH INFORMATION MANAGEMENT | Facility: CLINIC | Age: 62
End: 2022-12-01

## 2022-12-01 LAB
ALT SERPL-CCNC: 14 IU/L (ref 5–35)
AST SERPL-CCNC: 29 U/L (ref 5–34)
CREATININE (EXTERNAL): 1.05 MG/DL (ref 0.5–1.3)
GFR ESTIMATED (EXTERNAL): 56.4 ML/MIN/1.73M2

## 2022-12-02 ENCOUNTER — OFFICE VISIT (OUTPATIENT)
Dept: INTERNAL MEDICINE | Facility: CLINIC | Age: 62
End: 2022-12-02
Payer: COMMERCIAL

## 2022-12-02 VITALS
HEIGHT: 66 IN | OXYGEN SATURATION: 99 % | BODY MASS INDEX: 19.82 KG/M2 | DIASTOLIC BLOOD PRESSURE: 67 MMHG | WEIGHT: 123.3 LBS | HEART RATE: 82 BPM | SYSTOLIC BLOOD PRESSURE: 98 MMHG | TEMPERATURE: 98.7 F

## 2022-12-02 DIAGNOSIS — H61.23 BILATERAL IMPACTED CERUMEN: ICD-10-CM

## 2022-12-02 DIAGNOSIS — N95.2 ATROPHIC VAGINITIS: ICD-10-CM

## 2022-12-02 DIAGNOSIS — F51.01 PRIMARY INSOMNIA: ICD-10-CM

## 2022-12-02 DIAGNOSIS — R93.1 AGATSTON CORONARY ARTERY CALCIUM SCORE LESS THAN 100: Primary | ICD-10-CM

## 2022-12-02 DIAGNOSIS — M81.8 OTHER OSTEOPOROSIS WITHOUT CURRENT PATHOLOGICAL FRACTURE: Chronic | ICD-10-CM

## 2022-12-02 DIAGNOSIS — J84.89 NSIP (NONSPECIFIC INTERSTITIAL PNEUMONIA) (H): Chronic | ICD-10-CM

## 2022-12-02 PROCEDURE — 69209 REMOVE IMPACTED EAR WAX UNI: CPT | Mod: 50 | Performed by: INTERNAL MEDICINE

## 2022-12-02 PROCEDURE — 90471 IMMUNIZATION ADMIN: CPT | Performed by: INTERNAL MEDICINE

## 2022-12-02 PROCEDURE — 90677 PCV20 VACCINE IM: CPT | Performed by: INTERNAL MEDICINE

## 2022-12-02 PROCEDURE — 99213 OFFICE O/P EST LOW 20 MIN: CPT | Mod: 25 | Performed by: INTERNAL MEDICINE

## 2022-12-02 RX ORDER — QUETIAPINE FUMARATE 100 MG/1
100 TABLET, FILM COATED ORAL AT BEDTIME
Qty: 180 TABLET | Refills: 3 | Status: SHIPPED | OUTPATIENT
Start: 2022-12-02 | End: 2022-12-06

## 2022-12-02 RX ORDER — EZETIMIBE 10 MG/1
10 TABLET ORAL DAILY
Qty: 90 TABLET | Refills: 3 | Status: SHIPPED | OUTPATIENT
Start: 2022-12-02 | End: 2023-10-03

## 2022-12-02 RX ORDER — ESTRADIOL 0.1 MG/G
CREAM VAGINAL
Qty: 42.5 G | Refills: 3 | Status: SHIPPED | OUTPATIENT
Start: 2022-12-02 | End: 2023-10-03

## 2022-12-02 RX ORDER — POTASSIUM CHLORIDE 1500 MG/1
TABLET, EXTENDED RELEASE ORAL
Qty: 90 TABLET | Refills: 3 | Status: SHIPPED | OUTPATIENT
Start: 2022-12-02 | End: 2023-10-03

## 2022-12-02 RX ORDER — CYCLOBENZAPRINE HCL 10 MG
10 TABLET ORAL 2 TIMES DAILY PRN
Qty: 90 TABLET | Refills: 3 | Status: SHIPPED | OUTPATIENT
Start: 2022-12-02 | End: 2023-10-03

## 2022-12-02 NOTE — PROGRESS NOTES
Assessment & Plan     Agatston coronary artery calcium score less than 100  Most recent coronary artery calcium scan equals 70.  Intolerant of statins with significant myalgia.  Recommendation: We will continue on Repatha.  Current LDL is 80.  We are going to set a goal of less than 70.  Therefore, she will add Zetia.    Orders left for labs to be done in 3 months    NSIP (nonspecific interstitial pneumonia) (H)  Agustin notes reviewed-stable on Symbicort    Other osteoporosis without current pathological fracture  On Prolia- with bone mineral density due in March.  She is continue to have a nice improvement on this medication with no fractures or falls.  Continue with weightbearing and balance exercise along with calcium and vitamin D    Let me know that Prolia is a transient medication.  If this is stopped without a follow-up antiresorptive agent, she would lose all the gains made.  She will need either a bisphosphonate or raloxifene when she reaches low bone mass and lower fracture risk.    - DX Hip/Pelvis/Spine    Primary insomnia  Ordered and stable  - QUEtiapine (SEROQUEL) 100 MG tablet  Dispense: 180 tablet; Refill: 3    Atrophic vaginitis  Ordered and stable-has been seen by gynecology for vaginal bleeding.  Work-up negative.  I have encouraged her to follow-up with Dr. Cooley if any further bleeding occurs indefinitely at 1 year  - estradiol (ESTRACE) 0.1 MG/GM vaginal cream  Dispense: 42.5 g; Refill: 3    Bilateral impacted cerumen  Will remove impacted cerumen  - REMOVE IMPACTED CERUMEN  [id number]}         Return in about 3 months (around 3/2/2023) for Follow up.    Symone Arroyo MD  [facility]    Subjective   Radha is a 62 year old accompanied by her ALONE, presenting for the following health issues:  Recheck Medication      Radha is a delightful 62-year-old female who is here today for follow-up of her usual medical problems discussion of plans moving forward.    First, she  has hypercholesterolemia with intolerance to statins.  She has a coronary calcium score of 70.  Therefore, we are going to treat her LDL cholesterol to a guideline of less than 70.  She was started on Repatha with beautiful response.  LDL is at 80.  While she may get some additional improvement, we have decided to add Zetia to further enhance her situation.    She also has osteoporosis.  She was started on Prolia by Dr. Cisneros years ago.  She continues to have a nice response with Prolia.  She is due for a bone mineral density in 2023.  This test is ordered.  Of note, reminders were provided today that she cannot come off of Prolia without a follow-up medication.  The effects of Prolia are transient and she could lose what she has gained over the years if she does not have follow-up treatment with either bisphosphonate or raloxifene.    Her other medical problems are stable.  She is requesting a renewal of prescriptions    History of Present Illness       She eats 4 or more servings of fruits and vegetables daily.She consumes 0 sweetened beverage(s) daily.She exercises with enough effort to increase her heart rate 30 to 60 minutes per day.  She exercises with enough effort to increase her heart rate 5 days per week.   She is taking medications regularly.       Current Outpatient Medications   Medication     aspirin 81 MG EC tablet     azaTHIOprine (IMURAN) 50 mg tablet     calcium carbonate (OS-KOLE) 600 mg calcium (1,500 mg) tablet     cetirizine (ZYRTEC) 10 MG tablet     cyclobenzaprine (FLEXERIL) 10 MG tablet     estradiol (ESTRACE) 0.1 MG/GM vaginal cream     evolocumab (REPATHA) 140 MG/ML prefilled autoinjector     ezetimibe (ZETIA) 10 MG tablet     famotidine (PEPCID) 20 MG tablet     Flaxseed, Linseed, (FLAX SEED OIL) 1000 MG capsule     magnesium chloride 535 (64 Mg) MG TBEC CR tablet     MULTIVITAMIN ORAL     potassium chloride ER (KLOR-CON M) 20 MEQ CR tablet     QUEtiapine (SEROQUEL) 100 MG tablet      spironolactone-HCTZ (ALDACTAZIDE) 25-25 MG tablet     SYMBICORT 80-4.5 MCG/ACT Inhaler     traZODone (DESYREL) 100 MG tablet     tretinoin, emollient, (RENOVA) 0.02 % Crea     VITAMIN B COMPLEX ORAL     Current Facility-Administered Medications   Medication     denosumab (PROLIA) injection 60 mg           Objective    There were no vitals taken for this visit.  There is no height or weight on file to calculate BMI.  Physical Exam

## 2022-12-02 NOTE — NURSING NOTE
Patient identified using two patient identifiers.  Ear exam showing wax occlusion completed by provider. Order present in chart.  Solution: warm water was placed in the bilateral ear(s) via irrigation tool: elephant ear.    Post Ear Irrigation exam completed and cerumen plug removed.  Pain assessment completed.     Patient tolerated procedure:  yes           Su Bob RN  [facility]    1:12 PM, December 2, 2022

## 2022-12-05 DIAGNOSIS — B37.31 YEAST INFECTION OF THE VAGINA: ICD-10-CM

## 2022-12-05 NOTE — TELEPHONE ENCOUNTER
Former patient of Tomas & has not established care with another provider.  Please assign refill request to covering provider per clinic standard process.    Routing refill request to provider for review/approval because:  Please see pharmacy note.  SIG needs clarification.  No PCP    Last Written Prescription Date:  12/2/22  Last Fill Quantity: 180,  # refills: 3   Last office visit provider:  12/2/22     Requested Prescriptions   Pending Prescriptions Disp Refills     QUEtiapine (SEROQUEL) 100 MG tablet [Pharmacy Med Name: QUETIAPINE   Tablet] 180 tablet 3     Sig: TAKE 1 TABLET (100 MG) BY MOUTH AT BEDTIME [QUETIAPINE (SEROQUEL) 100 MG TABLET] TAKE 2 TABLETS (200 MG TOTAL) BY MOUTH AT BEDTIME *NEEDS CLARIFICATION*       Antipsychotic Medications Passed - 12/5/2022 10:39 AM        Passed - Blood pressure under 140/90 in past 12 months     BP Readings from Last 3 Encounters:   12/02/22 98/67   09/06/22 110/66   03/01/22 98/66                 Passed - Patient is 12 years of age or older        Passed - Lipid panel on file within the past 12 months     Recent Labs   Lab Test 11/30/22  0826   CHOL 197   TRIG 62      LDL 81   NHDL 93               Passed - CBC on file in past 12 months     Recent Labs   Lab Test 03/01/22  0941   WBC 5.9   RBC 5.05   HGB 14.9   HCT 45.9   PLT 77*                 Passed - Heart Rate on file within past 12 months     Pulse Readings from Last 3 Encounters:   12/02/22 82   09/06/22 84   03/01/22 90               Passed - A1c or Glucose on file in past 12 months     Recent Labs   Lab Test 09/06/22  0913 06/18/19  1205 12/12/18  0924   GLC 95   < > 88   A1C  --   --  5.5    < > = values in this interval not displayed.       Please review patients last 3 weights. If a weight gain of >10 lbs exists, you may refill the prescription once after instructing the patient to schedule an appointment within the next 30 days.    Wt Readings from Last 3 Encounters:   12/02/22 55.9 kg  "(123 lb 4.8 oz)   09/06/22 54.3 kg (119 lb 12.8 oz)   03/01/22 54.7 kg (120 lb 9.6 oz)             Passed - Medication is active on med list        Passed - Patient is not pregnant        Passed - No positve pregnancy test on file in past 12 months        Passed - Recent (6 mo) or future (30 days) visit within the authorizing provider's specialty     Patient had office visit in the last 6 months or has a visit in the next 30 days with authorizing provider or within the authorizing provider's specialty.  See \"Patient Info\" tab in inbasket, or \"Choose Columns\" in Meds & Orders section of the refill encounter.                 Vinicius Velazquez RN 12/05/22 10:40 AM  "

## 2022-12-06 DIAGNOSIS — R93.1 AGATSTON CORONARY ARTERY CALCIUM SCORE LESS THAN 100: Primary | ICD-10-CM

## 2022-12-06 RX ORDER — FLUCONAZOLE 150 MG/1
TABLET ORAL
Qty: 2 TABLET | Refills: 0 | OUTPATIENT
Start: 2022-12-06

## 2022-12-06 RX ORDER — QUETIAPINE FUMARATE 100 MG/1
TABLET, FILM COATED ORAL
Qty: 180 TABLET | Refills: 3 | Status: SHIPPED | OUTPATIENT
Start: 2022-12-06 | End: 2022-12-07

## 2022-12-06 NOTE — TELEPHONE ENCOUNTER
Outpatient Medication Detail     Disp Refills Start End SRINIVAS   fluconazole (DIFLUCAN) 150 MG tablet (Discontinued) 2 tablet 0 3/1/2022 9/6/2022 No   Sig: [FLUCONAZOLE (DIFLUCAN) 150 MG TABLET] Use weekly for two weeks   Sent to pharmacy as: Fluconazole 150 MG Oral Tablet (DIFLUCAN)   Class: E-Prescribe   Reason for Discontinue: Therapy completed   Order: [id number]

## 2022-12-12 ENCOUNTER — TRANSFERRED RECORDS (OUTPATIENT)
Dept: HEALTH INFORMATION MANAGEMENT | Facility: CLINIC | Age: 62
End: 2022-12-12

## 2022-12-16 ENCOUNTER — TRANSFERRED RECORDS (OUTPATIENT)
Dept: HEALTH INFORMATION MANAGEMENT | Facility: CLINIC | Age: 62
End: 2022-12-16

## 2022-12-28 DIAGNOSIS — B37.31 YEAST INFECTION OF THE VAGINA: ICD-10-CM

## 2022-12-29 RX ORDER — FLUCONAZOLE 150 MG/1
TABLET ORAL
Qty: 2 TABLET | Refills: 0 | OUTPATIENT
Start: 2022-12-29

## 2022-12-29 NOTE — TELEPHONE ENCOUNTER
Outpatient Medication Detail     Disp Refills Start End SRINIVAS   fluconazole (DIFLUCAN) 150 MG tablet (Discontinued) 2 tablet 0 3/1/2022 9/6/2022 No   Sig: [FLUCONAZOLE (DIFLUCAN) 150 MG TABLET] Use weekly for two weeks   Sent to pharmacy as: Fluconazole 150 MG Oral Tablet (DIFLUCAN)   Class: E-Prescribe   Reason for Discontinue: Therapy completed   Order: [id number]

## 2023-01-16 ENCOUNTER — NURSE TRIAGE (OUTPATIENT)
Dept: NURSING | Facility: CLINIC | Age: 63
End: 2023-01-16

## 2023-01-16 ENCOUNTER — LAB (OUTPATIENT)
Dept: LAB | Facility: CLINIC | Age: 63
End: 2023-01-16
Payer: COMMERCIAL

## 2023-01-16 DIAGNOSIS — R93.1 AGATSTON CORONARY ARTERY CALCIUM SCORE LESS THAN 100: ICD-10-CM

## 2023-01-16 DIAGNOSIS — E78.00 HYPERCHOLESTEROLEMIA: ICD-10-CM

## 2023-01-16 LAB
CHOLEST SERPL-MCNC: 232 MG/DL
HDLC SERPL-MCNC: 107 MG/DL
LDLC SERPL CALC-MCNC: 106 MG/DL
NONHDLC SERPL-MCNC: 125 MG/DL
TRIGL SERPL-MCNC: 93 MG/DL

## 2023-01-16 PROCEDURE — 36415 COLL VENOUS BLD VENIPUNCTURE: CPT

## 2023-01-16 PROCEDURE — 80061 LIPID PANEL: CPT

## 2023-01-16 PROCEDURE — 84443 ASSAY THYROID STIM HORMONE: CPT

## 2023-01-16 NOTE — TELEPHONE ENCOUNTER
The patient is calling and is inquiring about her LDL lab on 01/16/23    The patient is concerned about the elevation and would like to speak with a provider to explain why is it elevated if she is taking the medication as prescribed and her diet has not changed    The patient is looking for a provider rationale for the findings    Triage guidelines recommend to call pcp within 24 hours    Caller verbalized and understands directives    Triage guidelines recommend    Reason for Disposition    Lab result questions    [1] Follow-up call from patient regarding patient's clinical status AND [2] information NON-URGENT    Additional Information    Negative: Lab calling with strep throat test results and triager can call in prescription    Negative: Lab calling with urinalysis test results and triager can call in prescription    Negative: Medication questions    Negative: Medication renewal and refill questions    Negative: Pre-operative or pre-procedural questions    Negative: ED call to PCP (i.e., primary care provider; doctor, NP, or PA)    Negative: Doctor (or NP/PA) call to PCP    Negative: Call about patient who is currently hospitalized    Negative: Lab or radiology calling with CRITICAL test results    Negative: [1] Follow-up call from patient regarding patient's clinical status AND [2] information urgent    Negative: [1] Caller requests to speak ONLY to PCP AND [2] URGENT question    Negative: [1] Caller requests to speak to PCP now AND [2] won't tell us reason for call  (Exception: If 10 pm to 6 am, caller must first discuss reason for the call.)    Negative: Notification of hospital admission    Negative: Notification of death    Negative: Caller requesting lab results  (Exception: Routine or non-urgent lab result.)    Negative: Lab or radiology calling with test results    Protocols used: INFORMATION ONLY CALL - NO TRIAGE-A-, PCP CALL - NO TRIAGE-A-

## 2023-01-18 ENCOUNTER — VIRTUAL VISIT (OUTPATIENT)
Dept: INTERNAL MEDICINE | Facility: CLINIC | Age: 63
End: 2023-01-18
Payer: COMMERCIAL

## 2023-01-18 DIAGNOSIS — J84.89 NSIP (NONSPECIFIC INTERSTITIAL PNEUMONIA) (H): ICD-10-CM

## 2023-01-18 DIAGNOSIS — E78.00 HYPERCHOLESTEROLEMIA: ICD-10-CM

## 2023-01-18 DIAGNOSIS — I25.10 CORONARY ARTERY CALCIFICATION SEEN ON CT SCAN: Primary | ICD-10-CM

## 2023-01-18 PROBLEM — J45.30 MILD PERSISTENT ASTHMA: Status: ACTIVE | Noted: 2022-06-09

## 2023-01-18 PROBLEM — J84.9 ILD (INTERSTITIAL LUNG DISEASE) (H): Status: ACTIVE | Noted: 2019-02-09

## 2023-01-18 LAB — TSH SERPL DL<=0.005 MIU/L-ACNC: 2.03 UIU/ML (ref 0.3–4.2)

## 2023-01-18 PROCEDURE — 99215 OFFICE O/P EST HI 40 MIN: CPT | Mod: TEL | Performed by: INTERNAL MEDICINE

## 2023-01-18 RX ORDER — UBIDECARENONE 100 MG
CAPSULE ORAL
COMMUNITY
Start: 2022-09-23 | End: 2023-03-10

## 2023-01-18 NOTE — PROGRESS NOTES
Radha is a 62 year old female being evaluated via a billable phone visit, and would like to be contacted via the following  Cell phone/Mobile:   Telephone Information:   Mobile [phone]       ASSESSMENT and PLAN:  1. Coronary artery calcification seen on CT scan  With goal LDL below 70.  Discussed peak and trough effects of any medication including Repatha.  Recommend checking cholesterol at same interval after Repatha.  Recommend adding Zetia and giving that at least 2 weeks.  Could also add low-dose traditional statin  - Lipid panel reflex to direct LDL Fasting; Standing    2. NSIP (nonspecific interstitial pneumonia) (H)  On azathioprine with increased risk from connective tissue disease    3. Hypercholesterolemia  Noted and discussed treatment and goals.  Rule out new hypothyroidism  - Lipid panel reflex to direct LDL Fasting; Standing  - TSH; Future       Patient Instructions   Recommend checking cholesterol profile at same interval of Repatha-proximately 3 to 5 days after injection    Add Zetia 10 mg daily    Standing order for lipid profile    TSH to rule out coexisting hypothyroidism            Return in about 4 months (around 5/18/2023) for using a video visit.       CHIEF COMPLAINT:  Chief Complaint   Patient presents with     Recheck Medication     Repatha and Zetia       History of Present Illness       She eats 4 or more servings of fruits and vegetables daily.She consumes 0 sweetened beverage(s) daily.She exercises with enough effort to increase her heart rate 30 to 60 minutes per day.  She exercises with enough effort to increase her heart rate 5 days per week.   She is taking medications regularly.      HISTORY OF PRESENT ILLNESS:  Radha is a 62 year old female contacting the clinic today via phone for questions regarding her cholesterol.  She was found to have a calcium score of 71 on a calcium scan.  Cardiology recommended a goal LDL below 70 to prevent vascular events.  Stress test was  "normal.  She developed side effects on high-dose Crestor and atorvastatin and was initiated on Repatha in October.  Follow-up lipid profile done approximately 5 days after dose showed an LDL of 81, but profile this past week showed an LDL of 106.  She questions why this occurred.  Second lipid profile was done 10 days after Repatha according to review and calculation we discussed peak and trough of medications and bell curve of pharmacokinetics.  She has been advised that if the Repatha did not achieve her goal, she should add Zetia but has not yet done so    We discussed American Heart Association guidelines regarding low-dose statins, high-dose statin, primary prevention and secondary prevention.  We reviewed reasons for rise in cholesterol and discuss addition of Zetia, or addition of low-dose statin for smoother control.  She would like to continue to be aggressive.  Blood pressure is normal    REVIEW OF SYSTEMS:  No peripheral edema    PFSH:  Social History     Social History Narrative     Not on file     Musician and a perfectionist    TOBACCO USE:  History   Smoking Status     Never   Smokeless Tobacco     Never       VITALS:  There were no vitals filed for this visit.  There were no vitals taken for this visit. Estimated body mass index is 19.9 kg/m  as calculated from the following:    Height as of 12/2/22: 1.676 m (5' 6\").    Weight as of 12/2/22: 55.9 kg (123 lb 4.8 oz).    PHYSICAL EXAM:  (observations via Phone)  Alert and oriented    MEDICATIONS  Current Outpatient Medications   Medication Sig Dispense Refill     co-enzyme Q-10 100 MG CAPS capsule        aspirin 81 MG EC tablet Take 81 mg by mouth daily       azaTHIOprine (IMURAN) 50 mg tablet Take 1 tablet by mouth daily        calcium carbonate (OS-KOLE) 600 mg calcium (1,500 mg) tablet Take 600 mg by mouth 2 times daily (with meals)       cetirizine (ZYRTEC) 10 MG tablet Take 10 mg by mouth daily       cyclobenzaprine (FLEXERIL) 10 MG tablet Take 1 " tablet (10 mg) by mouth 2 times daily as needed for muscle spasms 90 tablet 3     estradiol (ESTRACE) 0.1 MG/GM vaginal cream INSERT 1 GRAM INTO THE VAGINA DAILY FOR ONE WEEK, THEN USE 2-3 TIMES PER WEEK. 42.5 g 3     evolocumab (REPATHA) 140 MG/ML prefilled autoinjector Inject 1 mL (140 mg) Subcutaneous every 14 days 6 mL 3     ezetimibe (ZETIA) 10 MG tablet Take 1 tablet (10 mg) by mouth daily 90 tablet 3     famotidine (PEPCID) 20 MG tablet Take 20 mg by mouth At Bedtime       Flaxseed, Linseed, (FLAX SEED OIL) 1000 MG capsule Take 1 capsule by mouth daily For dry eyes        magnesium chloride 535 (64 Mg) MG TBEC CR tablet Take 535 mg by mouth daily       MULTIVITAMIN ORAL Take 1 tablet by mouth daily       potassium chloride ER (KLOR-CON M) 20 MEQ CR tablet TAKE 1 TABLET (20 MEQ TOTAL) BY MOUTH DAILY. Strength: 20 mEq 90 tablet 3     QUEtiapine (SEROQUEL) 100 MG tablet Take 1 tablet (100 mg) by mouth At Bedtime 90 tablet 3     spironolactone-HCTZ (ALDACTAZIDE) 25-25 MG tablet TAKE ONE AND A HALF TABLETS BY MOUTH DAILY. 135 tablet 11     SYMBICORT 80-4.5 MCG/ACT Inhaler INHALE 2 PUFFS BY MOUTH 2 TIMES DAILY.       traZODone (DESYREL) 100 MG tablet Take 2 tablets (200 mg) by mouth At Bedtime 360 tablet 3     tretinoin, emollient, (RENOVA) 0.02 % Crea Apply 1 Application topically daily For wrinkle prevention        VITAMIN B COMPLEX ORAL Take 1 capsule by mouth daily         Notes summarized:   Labs, x-rays, cardiology, GI tests reviewed: Calcium CT score of 71.  Stress test normal.  LDL x3  Recent Labs   Lab Test 09/06/22  0913 03/01/22  0941 12/15/21  0846   HGB  --  14.9 14.0   WBC  --  5.9 4.7     --  141   POTASSIUM 4.0  --  4.3   CR 0.95  --  0.87   VITDT 67 52  --      Lab Results   Component Value Date    FTHWY21CRU Negative 07/01/2022     Lab Results   Component Value Date    CHOL 232 01/16/2023     New orders:   Orders Placed This Encounter   Procedures     Lipid panel reflex to direct LDL Fasting      TSH       Independent review of:    Patient would like to receive their AVS by Nancy Vasquez MD  [facility]    Phone-Visit Details  Type of service:  Phone Visit  Patient has given verbal consent to a Phone visit?  Yes  How would you like to obtain your AVS?  Nancy  Will anyone else be joining your phone visit, giving supplemental history? No    Originating location (pt location): Home    Distant Location (provider location):  Off-site    Phone Start Time: 11:33 AM  Phone End time:  12:08 PM  Conversation plus orders: 35 minutes  Dictation time:  5 minutes    The visit lasted a total of 40 minutes

## 2023-01-18 NOTE — PATIENT INSTRUCTIONS
Recommend checking cholesterol profile at same interval of Repatha-proximately 3 to 5 days after injection    Add Zetia 10 mg daily    Standing order for lipid profile    TSH to rule out coexisting hypothyroidism

## 2023-02-08 ENCOUNTER — LAB (OUTPATIENT)
Dept: LAB | Facility: CLINIC | Age: 63
End: 2023-02-08
Payer: COMMERCIAL

## 2023-02-08 DIAGNOSIS — E78.00 HYPERCHOLESTEROLEMIA: ICD-10-CM

## 2023-02-08 DIAGNOSIS — I25.10 CORONARY ARTERY CALCIFICATION SEEN ON CT SCAN: ICD-10-CM

## 2023-02-08 LAB
CHOLEST SERPL-MCNC: 201 MG/DL
HDLC SERPL-MCNC: 113 MG/DL
LDLC SERPL CALC-MCNC: 74 MG/DL
NONHDLC SERPL-MCNC: 88 MG/DL
TRIGL SERPL-MCNC: 68 MG/DL

## 2023-02-08 PROCEDURE — 36415 COLL VENOUS BLD VENIPUNCTURE: CPT

## 2023-02-08 PROCEDURE — 80061 LIPID PANEL: CPT

## 2023-02-13 ENCOUNTER — TRANSFERRED RECORDS (OUTPATIENT)
Dept: HEALTH INFORMATION MANAGEMENT | Facility: CLINIC | Age: 63
End: 2023-02-13

## 2023-03-10 DIAGNOSIS — M81.0 SENILE OSTEOPOROSIS: Primary | ICD-10-CM

## 2023-03-10 DIAGNOSIS — Z92.29 PERSONAL HISTORY OF OTHER DRUG THERAPY: ICD-10-CM

## 2023-03-20 ENCOUNTER — ANCILLARY PROCEDURE (OUTPATIENT)
Dept: BONE DENSITY | Facility: CLINIC | Age: 63
End: 2023-03-20
Attending: INTERNAL MEDICINE
Payer: COMMERCIAL

## 2023-03-20 DIAGNOSIS — M81.8 OTHER OSTEOPOROSIS WITHOUT CURRENT PATHOLOGICAL FRACTURE: Chronic | ICD-10-CM

## 2023-03-20 PROCEDURE — 77080 DXA BONE DENSITY AXIAL: CPT | Mod: TC | Performed by: RADIOLOGY

## 2023-03-20 ASSESSMENT — ENCOUNTER SYMPTOMS
NAUSEA: 0
COUGH: 0
DYSURIA: 0
FEVER: 0
CHILLS: 0
FREQUENCY: 0
SHORTNESS OF BREATH: 0
SORE THROAT: 0
NERVOUS/ANXIOUS: 0
HEARTBURN: 0
ARTHRALGIAS: 1
DIARRHEA: 0
ABDOMINAL PAIN: 0
MYALGIAS: 0
PARESTHESIAS: 0
HEMATURIA: 0
WEAKNESS: 0
HEADACHES: 0
PALPITATIONS: 0
CONSTIPATION: 0
BREAST MASS: 0
EYE PAIN: 0
HEMATOCHEZIA: 0
DIZZINESS: 0
JOINT SWELLING: 0

## 2023-03-20 ASSESSMENT — ASTHMA QUESTIONNAIRES
QUESTION_2 LAST FOUR WEEKS HOW OFTEN HAVE YOU HAD SHORTNESS OF BREATH: NOT AT ALL
ACT_TOTALSCORE: 25
QUESTION_5 LAST FOUR WEEKS HOW WOULD YOU RATE YOUR ASTHMA CONTROL: COMPLETELY CONTROLLED
QUESTION_4 LAST FOUR WEEKS HOW OFTEN HAVE YOU USED YOUR RESCUE INHALER OR NEBULIZER MEDICATION (SUCH AS ALBUTEROL): NOT AT ALL
QUESTION_3 LAST FOUR WEEKS HOW OFTEN DID YOUR ASTHMA SYMPTOMS (WHEEZING, COUGHING, SHORTNESS OF BREATH, CHEST TIGHTNESS OR PAIN) WAKE YOU UP AT NIGHT OR EARLIER THAN USUAL IN THE MORNING: NOT AT ALL
QUESTION_1 LAST FOUR WEEKS HOW MUCH OF THE TIME DID YOUR ASTHMA KEEP YOU FROM GETTING AS MUCH DONE AT WORK, SCHOOL OR AT HOME: NONE OF THE TIME
ACT_TOTALSCORE: 25

## 2023-03-21 ENCOUNTER — ALLIED HEALTH/NURSE VISIT (OUTPATIENT)
Dept: FAMILY MEDICINE | Facility: CLINIC | Age: 63
End: 2023-03-21
Payer: COMMERCIAL

## 2023-03-21 DIAGNOSIS — M81.8 OTHER OSTEOPOROSIS WITHOUT CURRENT PATHOLOGICAL FRACTURE: Primary | ICD-10-CM

## 2023-03-21 PROCEDURE — 99207 PR NO CHARGE NURSE ONLY: CPT

## 2023-03-21 PROCEDURE — 96372 THER/PROPH/DIAG INJ SC/IM: CPT | Performed by: INTERNAL MEDICINE

## 2023-03-21 NOTE — PROGRESS NOTES
Indication: Prolia  (denosumab) is a prescription medicine used to treat osteoporosis in patients who:     Are at high risk for fracture, meaning patients who have had a fracture related to osteoporosis, or who have multiple risk factors for fracture     Cannot use another osteoporosis medicine or other osteoporosis medicines did not work well   The timeline for early/late injections would be 4 weeks early and any time after the 6 month bettye. If a patient receives their injection late, then the subsequent injection would be 6 months from the date that they actually received the injection    1.  When was the last injection?  9/19/2022  2.  Did they check with their insurance for this calendar year?  Yes  3.  Is there an order in the chart?  Yes  4.  Has the patient had dental work involving the bone in the past month or will have work in the next 6 months?  No  5.  Did you have the patient wait 15 minutes after the injection?  Yes  6.  Remember to use .injection under the medication notes    The following steps were completed to comply with the REMS program for Prolia:    Reviewed information in the Medication Guide and Patient Counseling Chart, including the serious risks of Prolia  and the symptoms of each risk.    Advised patient to seek prompt medical attention if they have signs or symptoms of any of the serious risks.  Provided each patient a copy of the Medication Guide and Patient Brochure.

## 2023-03-27 ENCOUNTER — LAB (OUTPATIENT)
Dept: INTERNAL MEDICINE | Facility: CLINIC | Age: 63
End: 2023-03-27

## 2023-03-27 ENCOUNTER — OFFICE VISIT (OUTPATIENT)
Dept: INTERNAL MEDICINE | Facility: CLINIC | Age: 63
End: 2023-03-27
Payer: COMMERCIAL

## 2023-03-27 VITALS
BODY MASS INDEX: 19.72 KG/M2 | HEART RATE: 78 BPM | HEIGHT: 66 IN | DIASTOLIC BLOOD PRESSURE: 68 MMHG | WEIGHT: 122.7 LBS | RESPIRATION RATE: 16 BRPM | SYSTOLIC BLOOD PRESSURE: 110 MMHG | OXYGEN SATURATION: 99 %

## 2023-03-27 DIAGNOSIS — Z12.12 SCREENING FOR COLORECTAL CANCER: ICD-10-CM

## 2023-03-27 DIAGNOSIS — Z00.00 ROUTINE GENERAL MEDICAL EXAMINATION AT A HEALTH CARE FACILITY: Primary | ICD-10-CM

## 2023-03-27 DIAGNOSIS — Z12.11 SCREENING FOR COLORECTAL CANCER: ICD-10-CM

## 2023-03-27 DIAGNOSIS — M81.8 OTHER OSTEOPOROSIS WITHOUT CURRENT PATHOLOGICAL FRACTURE: Chronic | ICD-10-CM

## 2023-03-27 DIAGNOSIS — M35.00 SICCA SYNDROME (H): Chronic | ICD-10-CM

## 2023-03-27 DIAGNOSIS — J84.89 NSIP (NONSPECIFIC INTERSTITIAL PNEUMONIA) (H): ICD-10-CM

## 2023-03-27 DIAGNOSIS — I25.10 CORONARY ARTERY CALCIFICATION SEEN ON CT SCAN: ICD-10-CM

## 2023-03-27 DIAGNOSIS — R82.994 HYPERCALCIURIA: ICD-10-CM

## 2023-03-27 DIAGNOSIS — F51.01 PRIMARY INSOMNIA: ICD-10-CM

## 2023-03-27 DIAGNOSIS — N95.2 ATROPHIC VAGINITIS: ICD-10-CM

## 2023-03-27 DIAGNOSIS — R93.1 AGATSTON CORONARY ARTERY CALCIUM SCORE LESS THAN 100: ICD-10-CM

## 2023-03-27 DIAGNOSIS — K21.9 GASTROESOPHAGEAL REFLUX DISEASE WITHOUT ESOPHAGITIS: ICD-10-CM

## 2023-03-27 LAB
ALBUMIN SERPL BCG-MCNC: 4.6 G/DL (ref 3.5–5.2)
ALP SERPL-CCNC: 48 U/L (ref 35–104)
ALT SERPL W P-5'-P-CCNC: 15 U/L (ref 10–35)
ANION GAP SERPL CALCULATED.3IONS-SCNC: 11 MMOL/L (ref 7–15)
AST SERPL W P-5'-P-CCNC: 31 U/L (ref 10–35)
BASOPHILS # BLD AUTO: 0 10E3/UL (ref 0–0.2)
BASOPHILS NFR BLD AUTO: 0 %
BILIRUB DIRECT SERPL-MCNC: <0.2 MG/DL (ref 0–0.3)
BILIRUB SERPL-MCNC: 0.3 MG/DL
BUN SERPL-MCNC: 22.9 MG/DL (ref 8–23)
CALCIUM SERPL-MCNC: 9.5 MG/DL (ref 8.8–10.2)
CHLORIDE SERPL-SCNC: 100 MMOL/L (ref 98–107)
CHOLEST SERPL-MCNC: 174 MG/DL
CREAT SERPL-MCNC: 1.11 MG/DL (ref 0.51–0.95)
DEPRECATED HCO3 PLAS-SCNC: 30 MMOL/L (ref 22–29)
EOSINOPHIL # BLD AUTO: 0.2 10E3/UL (ref 0–0.7)
EOSINOPHIL NFR BLD AUTO: 4 %
ERYTHROCYTE [DISTWIDTH] IN BLOOD BY AUTOMATED COUNT: 12.3 % (ref 10–15)
GFR SERPL CREATININE-BSD FRML MDRD: 56 ML/MIN/1.73M2
GLUCOSE SERPL-MCNC: 100 MG/DL (ref 70–99)
HCT VFR BLD AUTO: 44.7 % (ref 35–47)
HDLC SERPL-MCNC: 96 MG/DL
HGB BLD-MCNC: 14.9 G/DL (ref 11.7–15.7)
IMM GRANULOCYTES # BLD: 0 10E3/UL
IMM GRANULOCYTES NFR BLD: 0 %
LDLC SERPL CALC-MCNC: 69 MG/DL
LYMPHOCYTES # BLD AUTO: 2.1 10E3/UL (ref 0.8–5.3)
LYMPHOCYTES NFR BLD AUTO: 39 %
MCH RBC QN AUTO: 30.9 PG (ref 26.5–33)
MCHC RBC AUTO-ENTMCNC: 33.3 G/DL (ref 31.5–36.5)
MCV RBC AUTO: 93 FL (ref 78–100)
MONOCYTES # BLD AUTO: 0.4 10E3/UL (ref 0–1.3)
MONOCYTES NFR BLD AUTO: 8 %
NEUTROPHILS # BLD AUTO: 2.6 10E3/UL (ref 1.6–8.3)
NEUTROPHILS NFR BLD AUTO: 49 %
NONHDLC SERPL-MCNC: 78 MG/DL
PLATELET # BLD AUTO: 152 10E3/UL (ref 150–450)
POTASSIUM SERPL-SCNC: 4.1 MMOL/L (ref 3.4–5.3)
PROT SERPL-MCNC: 7 G/DL (ref 6.4–8.3)
RBC # BLD AUTO: 4.82 10E6/UL (ref 3.8–5.2)
SODIUM SERPL-SCNC: 141 MMOL/L (ref 136–145)
TRIGL SERPL-MCNC: 45 MG/DL
WBC # BLD AUTO: 5.3 10E3/UL (ref 4–11)

## 2023-03-27 PROCEDURE — 99214 OFFICE O/P EST MOD 30 MIN: CPT | Mod: 25 | Performed by: INTERNAL MEDICINE

## 2023-03-27 PROCEDURE — 80053 COMPREHEN METABOLIC PANEL: CPT | Performed by: INTERNAL MEDICINE

## 2023-03-27 PROCEDURE — 99396 PREV VISIT EST AGE 40-64: CPT | Performed by: INTERNAL MEDICINE

## 2023-03-27 PROCEDURE — 80061 LIPID PANEL: CPT | Performed by: INTERNAL MEDICINE

## 2023-03-27 PROCEDURE — 85025 COMPLETE CBC W/AUTO DIFF WBC: CPT | Performed by: INTERNAL MEDICINE

## 2023-03-27 PROCEDURE — 82248 BILIRUBIN DIRECT: CPT | Performed by: INTERNAL MEDICINE

## 2023-03-27 PROCEDURE — 36415 COLL VENOUS BLD VENIPUNCTURE: CPT | Performed by: INTERNAL MEDICINE

## 2023-03-27 ASSESSMENT — ENCOUNTER SYMPTOMS
ARTHRALGIAS: 1
SORE THROAT: 0
DIZZINESS: 0
FREQUENCY: 0
FEVER: 0
NAUSEA: 0
PARESTHESIAS: 0
HEMATOCHEZIA: 0
HEARTBURN: 0
HEADACHES: 0
EYE PAIN: 0
PALPITATIONS: 0
MYALGIAS: 0
NERVOUS/ANXIOUS: 0
HEMATURIA: 0
DIARRHEA: 0
CHILLS: 0
COUGH: 0
WEAKNESS: 0
ABDOMINAL PAIN: 0
JOINT SWELLING: 0
SHORTNESS OF BREATH: 0
CONSTIPATION: 0
DYSURIA: 0
BREAST MASS: 0

## 2023-03-27 NOTE — ASSESSMENT & PLAN NOTE
Doing very well on Prolia.  Last DEXA 3/2023 showed 2% increase in BMD in bilateral hips and spine.  -Last Prolia injection 3/23/2023, will be due for next in September  -Next DEXA in March 2025

## 2023-03-27 NOTE — PATIENT INSTRUCTIONS
Call our clinic at [phone] in July/August to get your next Prolia injection scheduled for September.       Preventive Health Recommendations  Female Ages 50 - 64    Yearly exam: See your health care provider every year in order to  Review health changes.   Discuss preventive care.    Review your medicines if your doctor has prescribed any.    Get a Pap test every three years (unless you have an abnormal result and your provider advises testing more often).  If you get Pap tests with HPV test, you only need to test every 5 years, unless you have an abnormal result.   You do not need a Pap test if your uterus was removed (hysterectomy) and you have not had cancer.  You should be tested each year for STDs (sexually transmitted diseases) if you're at risk.   Have a mammogram every 1 to 2 years.  Have a colonoscopy at age 50, or have a yearly FIT test (stool test). These exams screen for colon cancer.    Have a cholesterol test every 5 years, or more often if advised.  Have a diabetes test (fasting glucose) every three years. If you are at risk for diabetes, you should have this test more often.   If you are at risk for osteoporosis (brittle bone disease), think about having a bone density scan (DEXA).    Shots: Get a flu shot each year. Get a tetanus shot every 10 years.    Nutrition:   Eat at least 5 servings of fruits and vegetables each day.  Eat whole-grain bread, whole-wheat pasta and brown rice instead of white grains and rice.  Get adequate Calcium and Vitamin D.     Lifestyle  Exercise at least 150 minutes a week (30 minutes a day, 5 days a week). This will help you control your weight and prevent disease.  Limit alcohol to one drink per day.  No smoking.   Wear sunscreen to prevent skin cancer.   See your dentist every six months for an exam and cleaning.  See your eye doctor every 1 to 2 years.

## 2023-03-27 NOTE — ASSESSMENT & PLAN NOTE
We discussed healthy lifestyle, nutrition, cardiovascular risk reduction, self care, safety, sunscreen, and timing of cancer screening.  Health maintenance screening and immunizations reviewed with the patient.    - Mammo BIRADS 1 2/2023  - Pap due 2027  - Cologuard ordered today  - Update labs today   - Vaccines all up to date

## 2023-03-27 NOTE — ASSESSMENT & PLAN NOTE
Follows with Dr. Berry (pulm) and Dr. Arce (rheum), recent notes reviewed.  - Stable on imuran and symbicort

## 2023-03-27 NOTE — ASSESSMENT & PLAN NOTE
CAC score of 71 (9/19/22), unable to tolerate high dose statin so started on Repatha. Zetia started 1/2023 as LDL was in the low 100s. TSH 2.03. Repeat lipid panel 2/8/23, LDL down to 74.   - Repeat lipids today  - Continue repatha and zetia  - Consider adding back low dose statin if LDL not at goal of 65-70

## 2023-03-27 NOTE — ASSESSMENT & PLAN NOTE
Follows with Dr. Arce from rheumatology, recent note reviewed.  - Continue q6 month f/u with rheum  - Continue imuran 50 mg daily

## 2023-03-27 NOTE — PROGRESS NOTES
SUBJECTIVE:   CC: Radha is an 62 year old who presents for preventive health visit.   Additional Questions 12/2/2022   Roomed by TEODORAJELIEK   Accompanied by ALONE   Patient has been advised of split billing requirements and indicates understanding: Yes  Healthy Habits:     Getting at least 3 servings of Calcium per day:  Yes    Bi-annual eye exam:  Yes    Dental care twice a year:  Yes    Sleep apnea or symptoms of sleep apnea:  None    Diet:  Regular (no restrictions)    Frequency of exercise:  6-7 days/week    Duration of exercise:  30-45 minutes    Taking medications regularly:  Yes    Medication side effects:  None    PHQ-2 Total Score: 0    Additional concerns today:  Yes    ILD/NSIP: Last saw pulm (Dr. Berry) at [facility] 3/23/23. Symbicort continued. Plan follow up 9/2023.     Sjogren's: Follows with Dr. Arce (rheum), sees every 6 months, last visit 12/1/22. On imuran 50 mg daily.     CAD: CAC score of 71 (9/19/22), unable to tolerate high dose statin so started on Repatha. Discussed with Dr. Vasquez 1/18/2023 - zetia started. TSH 2.03. Repeat lipid panel 2/8/23, LDL down to 74.   - ASA 81     Hypercalciuria: spironolactone/HCTZ 25/25 1.5 tablets daily. KCl 20 mEq daily.     Osteoporosis: Diagnosed in s/o continuous steroid use. Prolia, last injection 3/21/23.  calcium. Last DEXA 3/2023 showed 2% increase in BMD in hips and spine.     Insomnia: Seroquel 100 mg daily, trazodone 200 mg  at bedtime.     Atrophic vaginitis: estrace cream     GERD: pepcid 20 mg    Muscle tightness: Flexeril 10 mg BID PRN, maybe uses a few times a month.     Allergies: zyrtec 10 mg daily     Works as an organist.     Sees [facility], Sonam Aburto once a year for skin checks, last 12/2022.     Today's PHQ-2 Score:   PHQ-2 ( 1999 Pf[employer]) 3/20/2023   Q1: Little interest or pleasure in doing things 0   Q2: Feeling down, depressed or hopeless 0   PHQ-2 Score 0   PHQ-2 Total Score (12-17 Years)- Positive if 3 or more  points; Administer PHQ-A if positive -   Q1: Little interest or pleasure in doing things Not at all   Q2: Feeling down, depressed or hopeless Not at all   PHQ-2 Score 0       Social History     Tobacco Use     Smoking status: Never     Smokeless tobacco: Never   Substance Use Topics     Alcohol use: Yes     Comment: Alcoholic Drinks/day: occasional     Alcohol Use 3/20/2023   Prescreen: >3 drinks/day or >7 drinks/week? No     Reviewed orders with patient.  Reviewed health maintenance and updated orders accordingly - Yes  Lab work is in process  Labs reviewed in EPIC    Breast Cancer Screening:    Breast CA Risk Assessment (FHS-7) 12/13/2021   Do you have a family history of breast, colon, or ovarian cancer? No / Unknown       Mammogram Screening: Recommended mammography every 1-2 years with patient discussion and risk factor consideration  Pertinent mammograms are reviewed under the imaging tab.    History of abnormal Pap smear: NO - age 30-65 PAP every 5 years with negative HPV co-testing recommended  PAP / HPV Latest Ref Rng & Units 11/7/2022 2/26/2021   PAP   Negative for Intraepithelial Lesion or Malignancy (NILM) Negative for squamous intraepithelial lesion or malignancy  Electronically signed by Lizbet Stafford CT (ASCP) on 3/8/2021 at  3:30 PM     HPV16 NEG - Negative   HPV18 NEG - Negative   HRHPV NEG - Negative     Reviewed and updated as needed this visit by clinical staff   Tobacco  Allergies  Meds  Problems  Med Hx  Surg Hx  Fam Hx          Reviewed and updated as needed this visit by Provider   Tobacco  Allergies  Meds  Problems  Med Hx  Surg Hx  Fam Hx         Past Medical History:   Diagnosis Date     Cervicalgia      Idiopathic thrombocytopenic purpura (H)      Interstitial lung disease (H)      Interstitial pulmonary disease (H)      Lumbar radiculopathy      Osteopenia      Raynaud phenomenon      Right shoulder pain      Sjoegren syndrome      Vasomotor rhinitis       Past  "Surgical History:   Procedure Laterality Date     HC REPAIR OF NASAL SEPTUM      Description: Septoplasty;  Recorded: 07/12/2012;     IR CERVICAL EPIDURAL STEROID INJECTION  3/23/2009     SD THORACOSCOPY W/DX BX OF LUNG INFILTRATE UNILATRL      Description: Thoracoscopy Of Lungs And Pleural Space With Biopsy Of Lung Infiltrates;  Recorded: 07/23/2012;     OB History   No obstetric history on file.       Review of Systems   Constitutional: Negative for chills and fever.   HENT: Negative for congestion, ear pain, hearing loss and sore throat.    Eyes: Negative for pain and visual disturbance.   Respiratory: Negative for cough and shortness of breath.    Cardiovascular: Negative for chest pain, palpitations and peripheral edema.   Gastrointestinal: Negative for abdominal pain, constipation, diarrhea, heartburn, hematochezia and nausea.   Breasts:  Negative for tenderness, breast mass and discharge.   Genitourinary: Negative for dysuria, frequency, genital sores, hematuria, pelvic pain, urgency, vaginal bleeding and vaginal discharge.   Musculoskeletal: Positive for arthralgias. Negative for joint swelling and myalgias.   Skin: Negative for rash.   Neurological: Negative for dizziness, weakness, headaches and paresthesias.   Psychiatric/Behavioral: Negative for mood changes. The patient is not nervous/anxious.         OBJECTIVE:   /68 (BP Location: Right arm, Patient Position: Sitting, Cuff Size: Adult Regular)   Pulse 78   Resp 16   Ht 1.676 m (5' 6\")   Wt 55.7 kg (122 lb 11.2 oz)   SpO2 99%   BMI 19.80 kg/m    Physical Exam  GENERAL APPEARANCE: healthy, alert and no distress  EYES: Eyes grossly normal to inspection and conjunctivae and sclerae normal  HENT: nose and mouth without ulcers or lesions, oropharynx clear and oral mucous membranes moist  NECK: no adenopathy, no asymmetry, masses, or scars and thyroid normal to palpation  RESP: lungs clear to auscultation - no rales, rhonchi or wheezes  CV: " regular rate and rhythm, normal S1 S2, no S3 or S4, no murmur, click or rub, no peripheral edema and peripheral pulses strong  ABDOMEN: soft, nontender, no hepatosplenomegaly, no masses and bowel sounds normal  MS: no musculoskeletal defects are noted and gait is age appropriate without ataxia  SKIN: no suspicious lesions or rashes on exposed skin  NEURO: Normal strength and tone, sensory exam grossly normal, mentation intact and speech normal  PSYCH: mentation appears normal and affect normal/bright    Diagnostic Test Results:  Labs reviewed in Epic  Results for orders placed or performed in visit on 03/27/23 (from the past 24 hour(s))   CBC with platelets and differential    Narrative    The following orders were created for panel order CBC with platelets and differential.  Procedure                               Abnormality         Status                     ---------                               -----------         ------                     CBC with platelets and d...[[id number]]                      Final result                 Please view results for these tests on the individual orders.   CBC with platelets and differential   Result Value Ref Range    WBC Count 5.3 4.0 - 11.0 10e3/uL    RBC Count 4.82 3.80 - 5.20 10e6/uL    Hemoglobin 14.9 11.7 - 15.7 g/dL    Hematocrit 44.7 35.0 - 47.0 %    MCV 93 78 - 100 fL    MCH 30.9 26.5 - 33.0 pg    MCHC 33.3 31.5 - 36.5 g/dL    RDW 12.3 10.0 - 15.0 %    Platelet Count 152 150 - 450 10e3/uL    % Neutrophils 49 %    % Lymphocytes 39 %    % Monocytes 8 %    % Eosinophils 4 %    % Basophils 0 %    % Immature Granulocytes 0 %    Absolute Neutrophils 2.6 1.6 - 8.3 10e3/uL    Absolute Lymphocytes 2.1 0.8 - 5.3 10e3/uL    Absolute Monocytes 0.4 0.0 - 1.3 10e3/uL    Absolute Eosinophils 0.2 0.0 - 0.7 10e3/uL    Absolute Basophils 0.0 0.0 - 0.2 10e3/uL    Absolute Immature Granulocytes 0.0 <=0.4 10e3/uL       ASSESSMENT/PLAN:     Problem List Items Addressed This Visit         Respiratory    NSIP (nonspecific interstitial pneumonia) (H) (Chronic)     Follows with Dr. Berry (pulm) and Dr. Arce (rheum), recent notes reviewed.  - Stable on imuran and symbicort         Relevant Orders    PRIMARY CARE FOLLOW-UP SCHEDULING       Digestive    Sjogren's Syndrome (Chronic)     Follows with Dr. Arce from rheumatology, recent note reviewed.  - Continue q6 month f/u with rheum  - Continue imuran 50 mg daily         Gastroesophageal reflux disease without esophagitis     Well-controlled with as needed Pepcid 20 mg.            Endocrine    Hypercalciuria     Well controlled with spironolactone/HCTZ 25/25 1.5 tablets daily. KCl 20 mEq daily.             Circulatory    Agatston coronary artery calcium score less than 100     CAC score of 71 (9/19/22), unable to tolerate high dose statin so started on Repatha. Zetia started 1/2023 as LDL was in the low 100s. TSH 2.03. Repeat lipid panel 2/8/23, LDL down to 74.   - Repeat lipids today  - Continue repatha and zetia  - Consider adding back low dose statin if LDL not at goal of 65-70         Relevant Orders    Bilirubin direct    Coronary artery calcification seen on CT scan    Relevant Orders    Lipid Profile (Chol, Trig, HDL, LDL calc)       Musculoskeletal and Integumentary    Other osteoporosis without current pathological fracture (Chronic)     Doing very well on Prolia.  Last DEXA 3/2023 showed 2% increase in BMD in bilateral hips and spine.  -Last Prolia injection 3/23/2023, will be due for next in September  -Next DEXA in March 2025            Urinary    Atrophic vaginitis     Well-controlled with Estrace cream.            Other    Insomnia     Well-controlled with nightly trazodone 200 mg and Seroquel 100 mg.         Routine general medical examination at a health care facility - Primary     We discussed healthy lifestyle, nutrition, cardiovascular risk reduction, self care, safety, sunscreen, and timing of cancer screening.  Health  maintenance screening and immunizations reviewed with the patient.    - Mammo BIRADS 1 2/2023  - Pap due 2027  - Cologuard ordered today  - Update labs today   - Vaccines all up to date         Relevant Orders    CBC with platelets and differential (Completed)    Comprehensive metabolic panel   Other Visit Diagnoses     Screening for colorectal cancer        Relevant Orders    COLOGUARD([employer])        Patient has been advised of split billing requirements and indicates understanding: Yes    COUNSELING:  Reviewed preventive health counseling, as reflected in patient instructions  Special attention given to:        Regular exercise       Healthy diet/nutrition       Osteoporosis prevention/bone health       Colorectal Cancer Screening    She reports that she has never smoked. She has never used smokeless tobacco.    Deisi Marin MD  [facility]

## 2023-03-30 NOTE — RESULT ENCOUNTER NOTE
Your LDL is down to 69, I think we can continue the current regimen and not add in any other medications.     Your electrolytes are normal. Your kidney function was a little worse than usual, the most common reason for this is dehydration. Be sure to work on drinking 64 oz of water daily and then we will recheck again at your next visit.     Your liver function and blood counts were normal.

## 2023-03-31 ENCOUNTER — MYC MEDICAL ADVICE (OUTPATIENT)
Dept: INTERNAL MEDICINE | Facility: CLINIC | Age: 63
End: 2023-03-31
Payer: COMMERCIAL

## 2023-04-24 ENCOUNTER — TRANSFERRED RECORDS (OUTPATIENT)
Dept: HEALTH INFORMATION MANAGEMENT | Facility: CLINIC | Age: 63
End: 2023-04-24
Payer: COMMERCIAL

## 2023-05-19 ENCOUNTER — TRANSFERRED RECORDS (OUTPATIENT)
Dept: HEALTH INFORMATION MANAGEMENT | Facility: CLINIC | Age: 63
End: 2023-05-19
Payer: COMMERCIAL

## 2023-06-01 ENCOUNTER — TRANSFERRED RECORDS (OUTPATIENT)
Dept: HEALTH INFORMATION MANAGEMENT | Facility: CLINIC | Age: 63
End: 2023-06-01
Payer: COMMERCIAL

## 2023-06-01 LAB
ALT SERPL-CCNC: 13 IU/L (ref 5–35)
AST SERPL-CCNC: 28 U/L (ref 5–34)
CREATININE (EXTERNAL): 0.94 MG/DL (ref 0.5–1.3)
GFR ESTIMATED (EXTERNAL): 63.9 ML/MIN/1.73M2

## 2023-06-08 ENCOUNTER — TRANSFERRED RECORDS (OUTPATIENT)
Dept: HEALTH INFORMATION MANAGEMENT | Facility: CLINIC | Age: 63
End: 2023-06-08
Payer: COMMERCIAL

## 2023-06-19 ENCOUNTER — TRANSFERRED RECORDS (OUTPATIENT)
Dept: HEALTH INFORMATION MANAGEMENT | Facility: CLINIC | Age: 63
End: 2023-06-19
Payer: COMMERCIAL

## 2023-07-10 ENCOUNTER — TRANSFERRED RECORDS (OUTPATIENT)
Dept: HEALTH INFORMATION MANAGEMENT | Facility: CLINIC | Age: 63
End: 2023-07-10
Payer: COMMERCIAL

## 2023-08-04 ENCOUNTER — TRANSFERRED RECORDS (OUTPATIENT)
Dept: HEALTH INFORMATION MANAGEMENT | Facility: CLINIC | Age: 63
End: 2023-08-04
Payer: COMMERCIAL

## 2023-08-08 DIAGNOSIS — R19.5 POSITIVE COLORECTAL CANCER SCREENING USING COLOGUARD TEST: Primary | ICD-10-CM

## 2023-08-08 LAB — NONINV COLON CA DNA+OCC BLD SCRN STL QL: POSITIVE

## 2023-08-09 ENCOUNTER — TELEPHONE (OUTPATIENT)
Dept: GASTROENTEROLOGY | Facility: CLINIC | Age: 63
End: 2023-08-09
Payer: COMMERCIAL

## 2023-08-09 NOTE — TELEPHONE ENCOUNTER
"Endoscopy Scheduling Screen    Have you had a positive Covid test in the last 14 days?  No - but was just recently exposed. Wants to push out date.     Are you active on [email or web address]?   Yes    What insurance is in the chart?  Other:  [employer]    Ordering/Referring Provider:   TOMASA NELSON       (If ordering provider performs procedure, schedule with ordering provider unless otherwise instructed. )    BMI: Estimated body mass index is 19.8 kg/m  as calculated from the following:    Height as of 3/27/23: 1.676 m (5' 6\").    Weight as of 3/27/23: 55.7 kg (122 lb 11.2 oz).     Sedation Ordered  moderate sedation.   If patient BMI > 50 do not schedule in ASC.    Are you taking any prescription medications for pain?   No    Are you taking methadone or Suboxone?  No    Do you have a history of malignant hyperthermia or adverse reaction to anesthesia?  No    (Females) Are you currently pregnant?   No     Have you been diagnosed or told you have pulmonary hypertension?   No    Do you have an LVAD?  No    Have you been told you have moderate to severe sleep apnea?  No    Have you been told you have COPD, asthma, or any other lung disease?  Yes     What breathing problems do you have?  ILD, MILD ASTHMA      Do you use home oxygen?  No    Have your breathing problems required an ED visit or hospitalization in the last year?  No    Do you have any heart conditions?  No     Have you ever had or are you awaiting a heart or lung transplant?   No    Have you had a stroke or transient ischemic attack (TIA aka \"mini stroke\" in the last 6 months?   No    Have you been diagnosed with or been told you have cirrhosis of the liver?   No    Are you currently on dialysis?   No    Do you need assistance transferring?   No    BMI: Estimated body mass index is 19.8 kg/m  as calculated from the following:    Height as of 3/27/23: 1.676 m (5' 6\").    Weight as of 3/27/23: 55.7 kg (122 lb 11.2 oz).     Is patients BMI > 40 and scheduling location " [facility]?  No    Do you take the medication Phentermine, Ozempic or Wegovy?  No    Do you take the medication Naltrexone?  No    Do you take blood thinners?  No      Prep   Are you currently on dialysis or do you have chronic kidney disease?  No    Do you have a diagnosis of diabetes?  No    Do you have a diagnosis of cystic fibrosis (CF)?  No    On a regular basis do you go 3 -5 days between bowel movements?  No    BMI > 40?  No    Preferred Pharmacy:    [facility] - [place], [place] - [address]  [address]  [place] [address]  Phone: [phone] Fax: [phone]    Final Scheduling Details   Colonoscopy prep sent?  MiraLAX (No Mag Citrate)    Procedure scheduled  Colonoscopy    Surgeon:       Date of procedure:  8/29/2023     Schedule PAC:   No    Location  [facility] - [facility]    Sedation   Moderate Sedation    Patient Reminders:   You will receive a call from a Nurse to review instructions and health history.  This assessment must be completed prior to your procedure.  Failure to complete the Nurse assessment may result in the procedure being cancelled.      On the day of your procedure, please designate an adult(s) who can drive you home stay with you for the next 24 hours. The medicines used in the exam will make you sleepy. You will not be able to drive.      You cannot take public transportation, ride share services, or non-medical taxi service without a responsible caregiver.  Medical transport services are allowed with the requirement that a responsible caregiver will receive you at your destination.  We require that drivers and caregivers are confirmed prior to your procedure.

## 2023-08-11 ENCOUNTER — TELEPHONE (OUTPATIENT)
Dept: GASTROENTEROLOGY | Facility: CLINIC | Age: 63
End: 2023-08-11
Payer: COMMERCIAL

## 2023-08-11 NOTE — TELEPHONE ENCOUNTER
Pre assessment completed for upcoming procedure.      Procedure details:    Patient scheduled for Colonoscopy  on 8/29/23.     Arrival time: 1400. Procedure time 1500    Pre op exam needed? N/A    Facility location: [facility]; [address] [place] [address], [address], [place] [address]    Sedation type: Conscious sedation     Indication for procedure: Positive colorectal cancer screening using Cologuard test     COVID policy reviewed.    Designated  policy reviewed. Instructed to have someone stay 6 hours post procedure.       Chart review:     Electronic implanted devices? No    Diabetic? No    Diabetic medication HOLDING recommendations: (if applicable)  Oral diabetic medications: No  Diabetic injectables: No  Insulin: No    Medication review:    Anticoagulants? No    NSAIDS?  ASA - no hold time    Other medication HOLDING recommendations:  N/A      Prep for procedure:     Bowel prep recommendation: Miralax prep without magnesium citrate   Due to: standard bowel prep.    Prep instructions sent via [employer]     Reviewed procedure prep instructions.     Patient verbalized understanding and had no questions or concerns at this time.        Symone Lucas RN  Endoscopy Procedure Pre Assessment RN  [phone] option 4

## 2023-08-14 ENCOUNTER — MYC MEDICAL ADVICE (OUTPATIENT)
Dept: INTERNAL MEDICINE | Facility: CLINIC | Age: 63
End: 2023-08-14
Payer: COMMERCIAL

## 2023-08-14 ENCOUNTER — TRANSFERRED RECORDS (OUTPATIENT)
Dept: HEALTH INFORMATION MANAGEMENT | Facility: CLINIC | Age: 63
End: 2023-08-14
Payer: COMMERCIAL

## 2023-08-14 DIAGNOSIS — Z78.9 STATIN INTOLERANCE: ICD-10-CM

## 2023-08-14 DIAGNOSIS — I25.10 CORONARY ARTERY CALCIFICATION SEEN ON CT SCAN: ICD-10-CM

## 2023-08-14 NOTE — TELEPHONE ENCOUNTER
Nancy msg:   Hi. My pharmacy, [facility] for this medication, has reached out but not gotten an answer regarding a refill of repatha. I am out of refills and will need to inject the medication this coming weekend. Would you please send a refill to [facility] on [address] in [facility]? [phone]    Thanks!   Radha Whitfield is kyaw'd up reflecting pharmacy change.       Thank you,  Jaren Mccrary Jr., CMA on 8/14/2023 at 10:48 AM

## 2023-08-28 ENCOUNTER — ANESTHESIA EVENT (OUTPATIENT)
Dept: SURGERY | Facility: AMBULATORY SURGERY CENTER | Age: 63
End: 2023-08-28
Payer: COMMERCIAL

## 2023-08-28 NOTE — ANESTHESIA PREPROCEDURE EVALUATION
Anesthesia Pre-Procedure Evaluation    Patient: Radha Morrison   MRN: [id number] : 1960        Procedure : Procedure(s):  Colonoscopy          Past Medical History:   Diagnosis Date    Cervicalgia     Idiopathic thrombocytopenic purpura (H)     Interstitial lung disease (H)     Interstitial pulmonary disease (H)     Lumbar radiculopathy     Osteopenia     Raynaud phenomenon     Right shoulder pain     Sjoegren syndrome     Vasomotor rhinitis       Past Surgical History:   Procedure Laterality Date    HC REPAIR OF NASAL SEPTUM      Description: Septoplasty;  Recorded: 2012;    IR CERVICAL EPIDURAL STEROID INJECTION  3/23/2009    [place] THORACOSCOPY W/DX BX OF LUNG INFILTRATE UNILATRL      Description: Thoracoscopy Of Lungs And Pleural Space With Biopsy Of Lung Infiltrates;  Recorded: 2012;      Allergies   Allergen Reactions    Crestor [Rosuvastatin] Cramps     Tolerating 20mg dose       Social History     Tobacco Use    Smoking status: Never    Smokeless tobacco: Never   Substance Use Topics    Alcohol use: Yes     Comment: Alcoholic Drinks/day: occasional      Wt Readings from Last 1 Encounters:   23 55.7 kg (122 lb 11.2 oz)           Physical Exam    Airway        Mallampati: I   TM distance: > 3 FB   Neck ROM: full   Mouth opening: > 3 cm    Respiratory Devices and Support         Dental       (+) Minor Abnormalities - some fillings, tiny chips      Cardiovascular   cardiovascular exam normal          Pulmonary   pulmonary exam normal                OUTSIDE LABS:  CBC:   Lab Results   Component Value Date    WBC 5.3 2023    WBC 5.9 2022    HGB 14.9 2023    HGB 14.9 2022    HCT 44.7 2023    HCT 45.9 2022     2023    PLT 77 (L) 2022     BMP:   Lab Results   Component Value Date     2023     2022    POTASSIUM 4.1 2023    POTASSIUM 4.0 2022    CHLORIDE 100 2023    CHLORIDE 98 2022    CO2 30 (H)  03/27/2023    CO2 31 (H) 09/06/2022    BUN 22.9 03/27/2023    BUN 17.1 09/06/2022    CR 1.11 (H) 03/27/2023    CR 0.95 09/06/2022     (H) 03/27/2023    GLC 95 09/06/2022     COAGS: No results found for: PTT, INR, FIBR  POC: No results found for: BGM, HCG, HCGS  HEPATIC:   Lab Results   Component Value Date    ALBUMIN 4.6 03/27/2023    PROTTOTAL 7.0 03/27/2023    ALT 15 03/27/2023    AST 31 03/27/2023    ALKPHOS 48 03/27/2023    BILITOTAL 0.3 03/27/2023     OTHER:   Lab Results   Component Value Date    A1C 5.5 12/12/2018    KOLE 9.5 03/27/2023    MAG 2.0 09/06/2022    TSH 2.03 01/16/2023       Anesthesia Plan    ASA Status:  2    NPO Status:  NPO Appropriate    Anesthesia Type: MAC.     - Reason for MAC: straight local not clinically adequate   Induction: Intravenous, Propofol.   Maintenance: TIVA.        Consents    Anesthesia Plan(s) and associated risks, benefits, and realistic alternatives discussed. Questions answered and patient/representative(s) expressed understanding.     - Discussed:     - Discussed with:  Patient      - Extended Intubation/Ventilatory Support Discussed: No.      - Patient is DNR/DNI Status: No     Use of blood products discussed: No .     Postoperative Care       PONV prophylaxis: Ondansetron (or other 5HT-3), Background Propofol Infusion     Comments:                Oliverio Johnson MD

## 2023-08-29 ENCOUNTER — HOSPITAL ENCOUNTER (OUTPATIENT)
Facility: AMBULATORY SURGERY CENTER | Age: 63
Discharge: HOME | End: 2023-08-29
Attending: INTERNAL MEDICINE
Payer: COMMERCIAL

## 2023-08-29 ENCOUNTER — ANESTHESIA (OUTPATIENT)
Dept: SURGERY | Facility: AMBULATORY SURGERY CENTER | Age: 63
End: 2023-08-29
Payer: COMMERCIAL

## 2023-08-29 VITALS — HEART RATE: 79 BPM

## 2023-08-29 VITALS
HEIGHT: 66 IN | SYSTOLIC BLOOD PRESSURE: 117 MMHG | HEART RATE: 78 BPM | BODY MASS INDEX: 19.44 KG/M2 | WEIGHT: 121 LBS | DIASTOLIC BLOOD PRESSURE: 49 MMHG | OXYGEN SATURATION: 97 % | RESPIRATION RATE: 20 BRPM | TEMPERATURE: 96.2 F

## 2023-08-29 LAB — COLONOSCOPY: NORMAL

## 2023-08-29 PROCEDURE — 45385 COLONOSCOPY W/LESION REMOVAL: CPT | Mod: 33,KX | Performed by: INTERNAL MEDICINE

## 2023-08-29 PROCEDURE — 88305 TISSUE EXAM BY PATHOLOGIST: CPT | Mod: TC | Performed by: INTERNAL MEDICINE

## 2023-08-29 PROCEDURE — 88305 TISSUE EXAM BY PATHOLOGIST: CPT | Mod: 26 | Performed by: PATHOLOGY

## 2023-08-29 RX ORDER — PROPOFOL 10 MG/ML
INJECTION, EMULSION INTRAVENOUS PRN
Status: DISCONTINUED | OUTPATIENT
Start: 2023-08-29 | End: 2023-08-29

## 2023-08-29 RX ORDER — ONDANSETRON 2 MG/ML
4 INJECTION INTRAMUSCULAR; INTRAVENOUS
Status: DISCONTINUED | OUTPATIENT
Start: 2023-08-29 | End: 2023-08-29 | Stop reason: HOSPADM

## 2023-08-29 RX ORDER — NALOXONE HYDROCHLORIDE 0.4 MG/ML
0.4 INJECTION, SOLUTION INTRAMUSCULAR; INTRAVENOUS; SUBCUTANEOUS
Status: DISCONTINUED | OUTPATIENT
Start: 2023-08-29 | End: 2023-08-30 | Stop reason: HOSPADM

## 2023-08-29 RX ORDER — PROCHLORPERAZINE MALEATE 10 MG
10 TABLET ORAL EVERY 6 HOURS PRN
Status: DISCONTINUED | OUTPATIENT
Start: 2023-08-29 | End: 2023-08-30 | Stop reason: HOSPADM

## 2023-08-29 RX ORDER — LIDOCAINE HYDROCHLORIDE 20 MG/ML
INJECTION, SOLUTION INFILTRATION; PERINEURAL PRN
Status: DISCONTINUED | OUTPATIENT
Start: 2023-08-29 | End: 2023-08-29

## 2023-08-29 RX ORDER — SODIUM CHLORIDE, SODIUM LACTATE, POTASSIUM CHLORIDE, CALCIUM CHLORIDE 600; 310; 30; 20 MG/100ML; MG/100ML; MG/100ML; MG/100ML
INJECTION, SOLUTION INTRAVENOUS CONTINUOUS PRN
Status: DISCONTINUED | OUTPATIENT
Start: 2023-08-29 | End: 2023-08-29

## 2023-08-29 RX ORDER — NALOXONE HYDROCHLORIDE 0.4 MG/ML
0.2 INJECTION, SOLUTION INTRAMUSCULAR; INTRAVENOUS; SUBCUTANEOUS
Status: DISCONTINUED | OUTPATIENT
Start: 2023-08-29 | End: 2023-08-30 | Stop reason: HOSPADM

## 2023-08-29 RX ORDER — ONDANSETRON 4 MG/1
4 TABLET, ORALLY DISINTEGRATING ORAL EVERY 6 HOURS PRN
Status: DISCONTINUED | OUTPATIENT
Start: 2023-08-29 | End: 2023-08-30 | Stop reason: HOSPADM

## 2023-08-29 RX ORDER — ONDANSETRON 2 MG/ML
4 INJECTION INTRAMUSCULAR; INTRAVENOUS EVERY 6 HOURS PRN
Status: DISCONTINUED | OUTPATIENT
Start: 2023-08-29 | End: 2023-08-30 | Stop reason: HOSPADM

## 2023-08-29 RX ORDER — FLUMAZENIL 0.1 MG/ML
0.2 INJECTION, SOLUTION INTRAVENOUS
Status: DISCONTINUED | OUTPATIENT
Start: 2023-08-29 | End: 2023-08-30 | Stop reason: HOSPADM

## 2023-08-29 RX ORDER — LIDOCAINE 40 MG/G
CREAM TOPICAL
Status: DISCONTINUED | OUTPATIENT
Start: 2023-08-29 | End: 2023-08-29 | Stop reason: HOSPADM

## 2023-08-29 RX ORDER — PROPOFOL 10 MG/ML
INJECTION, EMULSION INTRAVENOUS CONTINUOUS PRN
Status: DISCONTINUED | OUTPATIENT
Start: 2023-08-29 | End: 2023-08-29

## 2023-08-29 RX ADMIN — SODIUM CHLORIDE, SODIUM LACTATE, POTASSIUM CHLORIDE, CALCIUM CHLORIDE: 600; 310; 30; 20 INJECTION, SOLUTION INTRAVENOUS at 15:28

## 2023-08-29 RX ADMIN — PROPOFOL 20 MG: 10 INJECTION, EMULSION INTRAVENOUS at 15:46

## 2023-08-29 RX ADMIN — LIDOCAINE HYDROCHLORIDE 80 MG: 20 INJECTION, SOLUTION INFILTRATION; PERINEURAL at 15:33

## 2023-08-29 RX ADMIN — PROPOFOL 20 MG: 10 INJECTION, EMULSION INTRAVENOUS at 15:48

## 2023-08-29 RX ADMIN — PROPOFOL 30 MG: 10 INJECTION, EMULSION INTRAVENOUS at 15:55

## 2023-08-29 RX ADMIN — PROPOFOL 40 MG: 10 INJECTION, EMULSION INTRAVENOUS at 15:34

## 2023-08-29 RX ADMIN — PROPOFOL 20 MG: 10 INJECTION, EMULSION INTRAVENOUS at 15:51

## 2023-08-29 RX ADMIN — PROPOFOL 20 MG: 10 INJECTION, EMULSION INTRAVENOUS at 15:44

## 2023-08-29 RX ADMIN — PROPOFOL 150 MCG/KG/MIN: 10 INJECTION, EMULSION INTRAVENOUS at 15:33

## 2023-08-29 NOTE — ANESTHESIA CARE TRANSFER NOTE
Patient: Radha Morrison    Procedure: Procedure(s):  Colonoscopy WITH POLYPECTOMY       Diagnosis: Positive colorectal cancer screening using Cologuard test [R19.5]  Diagnosis Additional Information: No value filed.    Anesthesia Type:   MAC     Note:    Oropharynx: oropharynx clear of all foreign objects and spontaneously breathing  Level of Consciousness: awake  Oxygen Supplementation: room air    Independent Airway: airway patency satisfactory and stable  Dentition: dentition unchanged  Vital Signs Stable: post-procedure vital signs reviewed and stable  Report to RN Given: handoff report given  Patient transferred to: Phase II    Handoff Report: Identifed the Patient, Identified the Reponsible Provider, Reviewed the pertinent medical history, Discussed the surgical course, Reviewed Intra-OP anesthesia mangement and issues during anesthesia, Set expectations for post-procedure period and Allowed opportunity for questions and acknowledgement of understanding      Vitals:  Vitals Value Taken Time   /57 08/29/23 1632   Temp 35.7  C (96.2  F) 08/29/23 1632   Pulse 75 08/29/23 1632   Resp 18 08/29/23 1632   SpO2 98 % 08/29/23 1632       Electronically Signed By: JOSSUE Maldonado CRNA  August 29, 2023  4:37 PM

## 2023-08-29 NOTE — H&P
Radha Morrison  [id number]  female  63 year old      Reason for procedure/surgery: + Cologuard    Patient Active Problem List   Diagnosis    NSIP (nonspecific interstitial pneumonia) (H)    Other osteoporosis without current pathological fracture    Raynaud's Phenomenon    Vasomotor Rhinitis    Sjogren's Syndrome    Cervicalgia    Lumbar Radiculopathy    Insomnia    Platelet disorder (H)    Agatston coronary artery calcium score less than 100    Screening for cervical cancer    Coronary artery calcification seen on CT scan    Mild persistent asthma    ILD (interstitial lung disease) (H)    Sjogren's syndrome (H)    Routine general medical examination at a health care facility    Gastroesophageal reflux disease without esophagitis    Atrophic vaginitis    Hypercalciuria       Past Surgical History:    Past Surgical History:   Procedure Laterality Date    HC REPAIR OF NASAL SEPTUM      Description: Septoplasty;  Recorded: 2012;    IR CERVICAL EPIDURAL STEROID INJECTION  3/23/2009    RI THORACOSCOPY W/DX BX OF LUNG INFILTRATE UNILATRL      Description: Thoracoscopy Of Lungs And Pleural Space With Biopsy Of Lung Infiltrates;  Recorded: 2012;       Past Medical History:   Past Medical History:   Diagnosis Date    Cervicalgia     Idiopathic thrombocytopenic purpura (H)     Interstitial lung disease (H)     Interstitial pulmonary disease (H)     Lumbar radiculopathy     Osteopenia     Raynaud phenomenon     Right shoulder pain     Sjoegren syndrome     Vasomotor rhinitis        Social History:   Social History     Tobacco Use    Smoking status: Never    Smokeless tobacco: Never   Substance Use Topics    Alcohol use: Yes     Comment: Alcoholic Drinks/day: occasional       Family History:   Family History   Problem Relation Age of Onset    Cancer Father         Mesothelioma,  age 71    Diabetes Brother          age 43    Diabetes Type 1 Son     Diabetes Son     Parkinsonism Mother     Osteoporosis Mother      Alcoholism Brother        Allergies:   Allergies   Allergen Reactions    Crestor [Rosuvastatin] Cramps     Tolerating 20mg dose        Active Medications:   Current Outpatient Medications   Medication Sig Dispense Refill    aspirin 81 MG EC tablet Take 81 mg by mouth daily      azaTHIOprine (IMURAN) 50 mg tablet Take 1 tablet by mouth daily       calcium carbonate (OS-KOLE) 600 mg calcium (1,500 mg) tablet Take 600 mg by mouth 2 times daily (with meals)      cetirizine (ZYRTEC) 10 MG tablet Take 10 mg by mouth daily      cyclobenzaprine (FLEXERIL) 10 MG tablet Take 1 tablet (10 mg) by mouth 2 times daily as needed for muscle spasms 90 tablet 3    estradiol (ESTRACE) 0.1 MG/GM vaginal cream INSERT 1 GRAM INTO THE VAGINA DAILY FOR ONE WEEK, THEN USE 2-3 TIMES PER WEEK. 42.5 g 3    evolocumab (REPATHA) 140 MG/ML prefilled autoinjector Inject 1 mL (140 mg) Subcutaneous every 14 days 6 mL 3    ezetimibe (ZETIA) 10 MG tablet Take 1 tablet (10 mg) by mouth daily 90 tablet 3    famotidine (PEPCID) 20 MG tablet Take 20 mg by mouth At Bedtime      Flaxseed, Linseed, (FLAX SEED OIL) 1000 MG capsule Take 1 capsule by mouth daily For dry eyes       magnesium chloride 535 (64 Mg) MG TBEC CR tablet Take 535 mg by mouth daily      potassium chloride ER (KLOR-CON M) 20 MEQ CR tablet TAKE 1 TABLET (20 MEQ TOTAL) BY MOUTH DAILY. Strength: 20 mEq 90 tablet 3    QUEtiapine (SEROQUEL) 100 MG tablet Take 1 tablet (100 mg) by mouth At Bedtime 90 tablet 3    spironolactone-HCTZ (ALDACTAZIDE) 25-25 MG tablet TAKE ONE AND A HALF TABLETS BY MOUTH DAILY. 135 tablet 11    SYMBICORT 80-4.5 MCG/ACT Inhaler INHALE 2 PUFFS BY MOUTH 2 TIMES DAILY.      traZODone (DESYREL) 100 MG tablet Take 2 tablets (200 mg) by mouth At Bedtime 360 tablet 3       Systemic Review:   CONSTITUTIONAL: NEGATIVE for fever, chills, change in weight  ENT/MOUTH: NEGATIVE for ear, mouth and throat problems  RESP: NEGATIVE for significant cough or SOB  CV: NEGATIVE for chest  pain, palpitations or peripheral edema    Physical Examination:   Vital Signs: There were no vitals taken for this visit.  GENERAL: healthy, alert and no distress  NECK: no adenopathy, no asymmetry, masses, or scars  RESP: lungs clear to auscultation - no rales, rhonchi or wheezes  CV: regular rate and rhythm, normal S1 S2, no S3 or S4, no murmur, click or rub, no peripheral edema and peripheral pulses strong  ABDOMEN: soft, nontender, no hepatosplenomegaly, no masses and bowel sounds normal  MS: no gross musculoskeletal defects noted, no edema    Plan: Appropriate to proceed as scheduled.      Eda Clements MD  8/29/2023    PCP:  Deisi Marin

## 2023-08-29 NOTE — ANESTHESIA POSTPROCEDURE EVALUATION
Patient: Radha Morrison    Procedure: Procedure(s):  Colonoscopy WITH POLYPECTOMY       Anesthesia Type:  MAC    Note:  Disposition: Outpatient   Postop Pain Control: Uneventful            Sign Out: Well controlled pain   PONV: No   Neuro/Psych: Uneventful            Sign Out: Acceptable/Baseline neuro status   Airway/Respiratory: Uneventful            Sign Out: Acceptable/Baseline resp. status   CV/Hemodynamics: Uneventful            Sign Out: Acceptable CV status; No obvious hypovolemia; No obvious fluid overload   Other NRE:    DID A NON-ROUTINE EVENT OCCUR?            Last vitals:  Vitals Value Taken Time   /57 08/29/23 1632   Temp 35.7  C (96.2  F) 08/29/23 1632   Pulse 75 08/29/23 1632   Resp 18 08/29/23 1632   SpO2 98 % 08/29/23 1632       Electronically Signed By: Oliverio Johnson MD  August 29, 2023  4:40 PM

## 2023-09-07 ENCOUNTER — PREP FOR PROCEDURE (OUTPATIENT)
Dept: GASTROENTEROLOGY | Facility: CLINIC | Age: 63
End: 2023-09-07
Payer: COMMERCIAL

## 2023-09-07 ENCOUNTER — TELEPHONE (OUTPATIENT)
Dept: GASTROENTEROLOGY | Facility: CLINIC | Age: 63
End: 2023-09-07
Payer: COMMERCIAL

## 2023-09-07 ENCOUNTER — PATIENT OUTREACH (OUTPATIENT)
Dept: GASTROENTEROLOGY | Facility: CLINIC | Age: 63
End: 2023-09-07
Payer: COMMERCIAL

## 2023-09-07 DIAGNOSIS — K63.5 POLYP OF ASCENDING COLON: Primary | ICD-10-CM

## 2023-09-07 DIAGNOSIS — Z86.0100 HISTORY OF COLONIC POLYPS: Primary | ICD-10-CM

## 2023-09-07 NOTE — TELEPHONE ENCOUNTER
Called pt to discuss request from Dr Clements and recommendations for Dr Kunz's follow up procedure.      Procedure/Imaging/Clinic: Colonoscopy with EMR   Physician: Ze   Timing: At least 3 months out (from 8/29/23 procedure date)  Scope time needed: 40 min   Anesthesia: MAC   Dx: Ascending colon polyp   Tier: 3   Location: [place] or [facility]   Header of letter for pt communication: Colonoscopy to remvoe polyp     December 1st procedure date agreed upon. Pt prefers [facility] location.     [employer]. [phone] If she has a wheelchair the estimate of the ride home is about $61. If she is ambulatory, than it's $15 less.     Explained they will need a , someone to stay with them for 24 hours and should stay in town for 24 hours (within 45 min of Hospital) post procedure    Patient needs to get pre-op physical completed. If outside [facility] system will need physical faxed to number [phone]   If you do not get a preop physical, your procedure could be cancelled, patient voiced understanding*    Preop Plan: Dr Marin, PCP in [facility].     Does patient have [employer] insurance?: [employer]    Med Review    Blood thinner -  none  ASA - none  Diabetic - none  Any meds by injection or mouth for weight loss or diabetes- none    Patient Education r/t procedure: parrish    A pre-op nurse will call 1-2 days prior to the procedure.    Adenoma study call: Yes, pt is in agreement with receiving call. Pt information added to EPIC list    Prep : Golytely bowel prep, prescription sent to pharmacy of patient's choosing.    Verbalized understanding of all instructions. All questions answered.     Procedure order placed, message routed to OR     Roma Mancilla, RN, BSN,   [facility]  Care coordinator

## 2023-09-07 NOTE — TELEPHONE ENCOUNTER
Patient reached out to schedule EMR, per recommendation notes from Colonoscopy completed on 8/29 from Dr Clements, patient is to schedule EMR with [facility]. Patient also expressed concerns that pathology results are not posted yet and when that would be done.    Staff message sent to Dr Clements to place order and to Dallin Ellis to schedule.

## 2023-09-15 ENCOUNTER — NURSE TRIAGE (OUTPATIENT)
Dept: NURSING | Facility: CLINIC | Age: 63
End: 2023-09-15
Payer: COMMERCIAL

## 2023-09-15 NOTE — TELEPHONE ENCOUNTER
Patient calling. She scheduled for a flu and an RSV vaccination at the same time at a [facility]. She was told to check in with her provider to make sure she should get the RSV vaccination.     Her appointment is scheduled for 5 pm, and she would like to know before that time.     OK to leave a detailed message at [phone].    Gale Christine RN  [facility]  September 15, 2023, 2:04 PM    Reason for Disposition   Requesting lab results and adult stable (no new symptoms, not worsening)    Additional Information   Negative: New-onset or worsening symptoms, see that protocol (e.g., diarrhea, runny nose, sore throat)   Negative: Medicine question not related to refill or renewal   Negative: Requesting a renewal or refill of a medicine patient is currently taking   Negative: Questions or concerns about high blood pressure   Negative: Nursing judgment   Negative: Nursing judgment   Negative: Nursing judgment    Protocols used: Information Only Call - No Triage-A-OH

## 2023-09-26 ASSESSMENT — ASTHMA QUESTIONNAIRES: ACT_TOTALSCORE: 25

## 2023-10-03 ENCOUNTER — OFFICE VISIT (OUTPATIENT)
Dept: INTERNAL MEDICINE | Facility: CLINIC | Age: 63
End: 2023-10-03
Attending: INTERNAL MEDICINE
Payer: COMMERCIAL

## 2023-10-03 ENCOUNTER — MYC MEDICAL ADVICE (OUTPATIENT)
Dept: INTERNAL MEDICINE | Facility: CLINIC | Age: 63
End: 2023-10-03

## 2023-10-03 VITALS
HEART RATE: 79 BPM | DIASTOLIC BLOOD PRESSURE: 64 MMHG | OXYGEN SATURATION: 98 % | RESPIRATION RATE: 18 BRPM | BODY MASS INDEX: 19.61 KG/M2 | SYSTOLIC BLOOD PRESSURE: 108 MMHG | TEMPERATURE: 97.8 F | WEIGHT: 121.5 LBS

## 2023-10-03 DIAGNOSIS — R73.01 ELEVATED FASTING BLOOD SUGAR: Primary | ICD-10-CM

## 2023-10-03 DIAGNOSIS — M35.02 SJOGREN'S SYNDROME WITH LUNG INVOLVEMENT (H): ICD-10-CM

## 2023-10-03 DIAGNOSIS — K21.9 GASTROESOPHAGEAL REFLUX DISEASE WITHOUT ESOPHAGITIS: ICD-10-CM

## 2023-10-03 DIAGNOSIS — M81.8 OTHER OSTEOPOROSIS WITHOUT CURRENT PATHOLOGICAL FRACTURE: Primary | ICD-10-CM

## 2023-10-03 DIAGNOSIS — N95.2 ATROPHIC VAGINITIS: ICD-10-CM

## 2023-10-03 DIAGNOSIS — M81.0 SENILE OSTEOPOROSIS: ICD-10-CM

## 2023-10-03 DIAGNOSIS — M54.2 CERVICALGIA: ICD-10-CM

## 2023-10-03 DIAGNOSIS — Z92.29 PERSONAL HISTORY OF OTHER DRUG THERAPY: ICD-10-CM

## 2023-10-03 DIAGNOSIS — J84.89 NSIP (NONSPECIFIC INTERSTITIAL PNEUMONIA) (H): ICD-10-CM

## 2023-10-03 DIAGNOSIS — E87.6 HYPOKALEMIA: ICD-10-CM

## 2023-10-03 DIAGNOSIS — F51.01 PRIMARY INSOMNIA: ICD-10-CM

## 2023-10-03 DIAGNOSIS — R93.1 AGATSTON CORONARY ARTERY CALCIUM SCORE LESS THAN 100: ICD-10-CM

## 2023-10-03 DIAGNOSIS — D12.2 ADENOMATOUS POLYP OF ASCENDING COLON: ICD-10-CM

## 2023-10-03 DIAGNOSIS — D69.1 PLATELET DISORDER (H): Chronic | ICD-10-CM

## 2023-10-03 DIAGNOSIS — R82.994 HYPERCALCIURIA: ICD-10-CM

## 2023-10-03 PROBLEM — I25.10 CORONARY ARTERY CALCIFICATION SEEN ON CT SCAN: Status: RESOLVED | Noted: 2021-09-03 | Resolved: 2023-10-03

## 2023-10-03 LAB
ANION GAP SERPL CALCULATED.3IONS-SCNC: 10 MMOL/L (ref 7–15)
BUN SERPL-MCNC: 15.8 MG/DL (ref 8–23)
CALCIUM SERPL-MCNC: 9.5 MG/DL (ref 8.8–10.2)
CHLORIDE SERPL-SCNC: 101 MMOL/L (ref 98–107)
CHOLEST SERPL-MCNC: 168 MG/DL
CREAT SERPL-MCNC: 0.93 MG/DL (ref 0.51–0.95)
DEPRECATED HCO3 PLAS-SCNC: 29 MMOL/L (ref 22–29)
EGFRCR SERPLBLD CKD-EPI 2021: 69 ML/MIN/1.73M2
GLUCOSE SERPL-MCNC: 112 MG/DL (ref 70–99)
HDLC SERPL-MCNC: 97 MG/DL
LDLC SERPL CALC-MCNC: 59 MG/DL
NONHDLC SERPL-MCNC: 71 MG/DL
POTASSIUM SERPL-SCNC: 3.9 MMOL/L (ref 3.4–5.3)
SODIUM SERPL-SCNC: 140 MMOL/L (ref 135–145)
TRIGL SERPL-MCNC: 60 MG/DL

## 2023-10-03 PROCEDURE — 80061 LIPID PANEL: CPT | Performed by: INTERNAL MEDICINE

## 2023-10-03 PROCEDURE — 80048 BASIC METABOLIC PNL TOTAL CA: CPT | Performed by: INTERNAL MEDICINE

## 2023-10-03 PROCEDURE — 36415 COLL VENOUS BLD VENIPUNCTURE: CPT | Performed by: INTERNAL MEDICINE

## 2023-10-03 PROCEDURE — 99214 OFFICE O/P EST MOD 30 MIN: CPT | Performed by: INTERNAL MEDICINE

## 2023-10-03 RX ORDER — SPIRONOLACTONE AND HYDROCHLOROTHIAZIDE 25; 25 MG/1; MG/1
1 TABLET ORAL DAILY
Qty: 135 TABLET | Refills: 11 | Status: SHIPPED | OUTPATIENT
Start: 2023-10-03 | End: 2023-10-03

## 2023-10-03 RX ORDER — CYCLOBENZAPRINE HCL 10 MG
10 TABLET ORAL 2 TIMES DAILY PRN
Qty: 90 TABLET | Refills: 3 | Status: SHIPPED | OUTPATIENT
Start: 2023-10-03 | End: 2024-03-25

## 2023-10-03 RX ORDER — QUETIAPINE FUMARATE 100 MG/1
100 TABLET, FILM COATED ORAL AT BEDTIME
Qty: 90 TABLET | Refills: 3 | Status: SHIPPED | OUTPATIENT
Start: 2023-10-03 | End: 2024-09-09

## 2023-10-03 RX ORDER — TRETINOIN 0.25 MG/G
CREAM TOPICAL
COMMUNITY
Start: 2023-06-22

## 2023-10-03 RX ORDER — ESTRADIOL 0.1 MG/G
CREAM VAGINAL
Qty: 42.5 G | Refills: 3 | Status: SHIPPED | OUTPATIENT
Start: 2023-10-03 | End: 2023-10-12

## 2023-10-03 RX ORDER — EZETIMIBE 10 MG/1
10 TABLET ORAL DAILY
Qty: 90 TABLET | Refills: 3 | Status: SHIPPED | OUTPATIENT
Start: 2023-10-03 | End: 2024-09-09

## 2023-10-03 RX ORDER — TRAZODONE HYDROCHLORIDE 100 MG/1
200 TABLET ORAL AT BEDTIME
Qty: 360 TABLET | Refills: 3 | Status: SHIPPED | OUTPATIENT
Start: 2023-10-03 | End: 2023-10-03

## 2023-10-03 RX ORDER — POTASSIUM CHLORIDE 1500 MG/1
TABLET, EXTENDED RELEASE ORAL
Qty: 90 TABLET | Refills: 3 | Status: SHIPPED | OUTPATIENT
Start: 2023-10-03 | End: 2024-09-09

## 2023-10-03 RX ORDER — SPIRONOLACTONE AND HYDROCHLOROTHIAZIDE 25; 25 MG/1; MG/1
1 TABLET ORAL DAILY
Qty: 90 TABLET | Refills: 3 | Status: SHIPPED | OUTPATIENT
Start: 2023-10-03 | End: 2024-09-09

## 2023-10-03 RX ORDER — TRAZODONE HYDROCHLORIDE 100 MG/1
200 TABLET ORAL AT BEDTIME
Qty: 180 TABLET | Refills: 3 | Status: SHIPPED | OUTPATIENT
Start: 2023-10-03 | End: 2024-09-09

## 2023-10-03 NOTE — ASSESSMENT & PLAN NOTE
CAC score of 71 (9/19/22), unable to tolerate high dose statin so started on Repatha. Zetia started 1/2023 as LDL was in the low 100s. TSH 2.03. Last LDL 3/2023 was 69.   - Repeat lipids today  - Continue repatha and zetia

## 2023-10-03 NOTE — PROGRESS NOTES
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  This is relieved with Flexeril 10 mg as needed.  -Refill of Flexeril provided today         Relevant Medications    cyclobenzaprine (FLEXERIL) 10 MG tablet       Respiratory    NSIP (nonspecific interstitial pneumonia) (H) (Chronic)     Follows with Dr. Berry (pulm) and Dr. Arce (rheum), recent notes reviewed. HR CT chest stable 9/11/23.   - Stable on imuran and symbicort  - Follow-up with rheumatology scheduled in December and follow-up with pulmonology as scheduled for tomorrow            Digestive    Gastroesophageal reflux disease without esophagitis     Well-controlled with as needed Pepcid 20 mg.         Adenomatous polyp of ascending colon     Cologuard positive 8/8/23, colonoscopy ordered -> 30 mm polyp 8/28/23, unable to be removed. Will be done in OR 12/1/23. She will come for pre-op 11/8/23.            Endocrine    Hypercalciuria     Well controlled with spironolactone/HCTZ 25/25 mg daily and KCl 20 mEq daily.          Relevant Medications    potassium chloride ER (KLOR-CON M) 20 MEQ CR tablet       Circulatory    Agatston coronary artery calcium score less than 100     CAC score of 71 (9/19/22), unable to tolerate high dose statin so started on Repatha. Zetia started 1/2023 as LDL was in the low 100s. TSH 2.03. Last LDL 3/2023 was 69.   - Repeat lipids today  - Continue repatha and zetia         Relevant Medications    ezetimibe (ZETIA) 10 MG tablet    Other Relevant Orders    Lipid panel reflex to direct LDL Fasting       Musculoskeletal and Integumentary    Other osteoporosis without current pathological fracture - Primary (Chronic)     Doing very well on Prolia.  Last DEXA 3/2023 showed 2% increase in BMD in bilateral hips and spine.  -Last Prolia injection 3/23/2023, due for next  injection, ordered today, will have her come back in 2 weeks when insurance approves  -Next DEXA in March 2025         Relevant Medications    denosumab (PROLIA) injection 60 mg    cyclobenzaprine (FLEXERIL) 10 MG tablet       Immune    Sjogren's syndrome (H24)     Follows with Dr. Arce from rheumatology, June visit note reviewed.  - Continue q6 month f/u with rheum  - Continue imuran 50 mg daily         Relevant Medications    tretinoin (RETIN-A) 0.025 % external cream       Urinary    Atrophic vaginitis     Well-controlled with Estrace cream.         Relevant Medications    estradiol (ESTRACE) 0.1 MG/GM vaginal cream       Hematologic    Platelet disorder (H) (Chronic)     EDTA resistant, clumps easily. Will keep in mind on future blood draws.            Other    Insomnia     Well-controlled with nightly trazodone 200 mg and Seroquel 100 mg.         Relevant Medications    QUEtiapine (SEROQUEL) 100 MG tablet    traZODone (DESYREL) 100 MG tablet     Other Visit Diagnoses       Hypokalemia        Relevant Medications    spironolactone-HCTZ (ALDACTAZIDE) 25-25 MG tablet    Senile osteoporosis        Relevant Medications    denosumab (PROLIA) injection 60 mg    cyclobenzaprine (FLEXERIL) 10 MG tablet    Other Relevant Orders    Basic metabolic panel    Personal history of other drug therapy        Relevant Medications    denosumab (PROLIA) injection 60 mg             Ordering of each unique test  Prescription drug management  I spent a total of 34 minutes on the day of the visit.   Time spent by me doing chart review, history and exam, documentation and further activities per the note     FUTURE APPOINTMENTS:       - Follow-up visit as scheduled in Nov for pre-op    Deisi Marin MD  [facility]    Felipa Garcia is a 63 year old, presenting for the following health issues:  Recheck Medication and Imm/Inj (Prolia shot )        10/3/2023     8:46 AM   Additional Questions    Roomed by Danielle MARIN   Accompanied by self     History of Present Illness     Osteoporosis: Last prolia 3/21/23. Last DEXA 3/2023 showed 2% increase in BMD in hips and spine.  Due for next injection.     ILD/NSIP: Last saw pulm (Dr. Berry) at [facility] 3/23/23. Symbicort continued. Using once a day, had noisy sounding breath sounds a few months ago, doing better today. Had repeat HR CT 9/11/23, NSIP/fibrosis/volume loss stable. Planned follow up is tomorrow.     Sjogren's: Follows with Dr. Arce (rheum), sees every 6 months, last visit 6/5/23, stable CBC and CRP normal. On imuran 50 mg daily.     CAD: CAC score of 71 (9/19/22), unable to tolerate high dose statin so started on Repatha. Discussed with Dr. Vasquez 1/18/2023 - zetia started. TSH 2.03. Repeat lipid panel 2/8/23, LDL down to 74. Then 69 3/27/23.   - ASA 81     Insomnia: Seroquel 100 mg daily, trazodone 200 mg at bedtime.      Atrophic vaginitis: estrace cream      GERD: pepcid 20 mg     Muscle tightness: Flexeril 10 mg BID PRN, maybe uses a few times a month. Always upper back.      Allergies: zyrtec 10 mg daily     Arthritis: Steroid injections in hand with [place] 2/2023, doing well at f/u 5/1/23, got repeat injections 5/22/23. Doing worse 8/2023, tried synvisc 8/16/23, no improvement. Unfortunately, still having lots of pain. Planning to wait until after Easter for possible joint replacement.     Skin check with derm 7/2023, nothing concerning.     Cologuard positive 8/8/23, colonoscopy ordered -> 30 mm polyp 8/28/23. Removal scheduled for 12/1/23. Will come for pre-op 11/8/23.     Continues to work as organist, this is more difficult with her arthritis. Also welcomed grandson in September. Was able to stay with them (in [place]) for 2 weeks. Hoping to go back over Thanksgiving.     Review of Systems   Constitutional, HEENT, cardiovascular, pulmonary, GI, , musculoskeletal, neuro, skin, endocrine and psych systems are negative, except as  otherwise noted.      Objective    /64 (BP Location: Right arm, Patient Position: Sitting, Cuff Size: Adult Regular)   Pulse 79   Temp 97.8  F (36.6  C) (Oral)   Resp 18   Wt 55.1 kg (121 lb 8 oz)   LMP  (LMP Unknown)   SpO2 98%   BMI 19.61 kg/m    Body mass index is 19.61 kg/m .  Physical Exam   GENERAL: healthy, alert and no distress  EYES: Eyes grossly normal to inspection, PERRL and conjunctivae and sclerae normal  HENT:  nose and mouth without ulcers or lesions  RESP: lungs clear to auscultation - no rales, rhonchi or wheezes  CV: regular rate and rhythm, normal S1 S2, no S3 or S4, no murmur, click or rub, no peripheral edema and peripheral pulses strong  ABDOMEN: soft, nontender  MS: no gross musculoskeletal defects noted, braces on b/l wrists, no edema  SKIN: no suspicious lesions or rashes on exposed skin   NEURO: Normal strength and tone, mentation intact and speech normal  PSYCH: mentation appears normal, affect normal/bright    No results found for this or any previous visit (from the past 24 hour(s)).

## 2023-10-03 NOTE — ASSESSMENT & PLAN NOTE
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  This is relieved with Flexeril 10 mg as needed.  -Refill of Flexeril provided today

## 2023-10-03 NOTE — ASSESSMENT & PLAN NOTE
Doing very well on Prolia.  Last DEXA 3/2023 showed 2% increase in BMD in bilateral hips and spine.  -Last Prolia injection 3/23/2023, due for next injection, ordered today, will have her come back in 2 weeks when insurance approves  -Next DEXA in March 2025

## 2023-10-03 NOTE — ASSESSMENT & PLAN NOTE
Follows with Dr. Berry (pulm) and Dr. Arce (rheum), recent notes reviewed. HR CT chest stable 9/11/23.   - Stable on imuran and symbicort  - Follow-up with rheumatology scheduled in December and follow-up with pulmonology as scheduled for tomorrow

## 2023-10-03 NOTE — ASSESSMENT & PLAN NOTE
Follows with Dr. Arce from rheumatology, Leta visit note reviewed.  - Continue q6 month f/u with rheum  - Continue imuran 50 mg daily

## 2023-10-03 NOTE — ASSESSMENT & PLAN NOTE
Cologuard positive 8/8/23, colonoscopy ordered -> 30 mm polyp 8/28/23, unable to be removed. Will be done in OR 12/1/23. She will come for pre-op 11/8/23.

## 2023-10-12 ENCOUNTER — TELEPHONE (OUTPATIENT)
Dept: INTERNAL MEDICINE | Facility: CLINIC | Age: 63
End: 2023-10-12
Payer: COMMERCIAL

## 2023-10-12 DIAGNOSIS — N95.2 ATROPHIC VAGINITIS: ICD-10-CM

## 2023-10-12 RX ORDER — ESTRADIOL 0.1 MG/G
CREAM VAGINAL
Qty: 42.5 G | Refills: 3 | Status: SHIPPED | OUTPATIENT
Start: 2023-10-12 | End: 2024-03-25

## 2023-10-12 NOTE — TELEPHONE ENCOUNTER
"Message from pharmacy regarding estradiol:    \"Per insurance it is an audit risk to the pharmacy to fill estradiol cream scripts that are NOT THE FIRST FILL with the directions on them that are not specifically for the first fill. Since this is not the first fill of this for this patient please send a new script with the CURRENT DIRECTIONS ONLY.. thanks very much!\"  "

## 2023-10-13 ENCOUNTER — TRANSFERRED RECORDS (OUTPATIENT)
Dept: HEALTH INFORMATION MANAGEMENT | Facility: CLINIC | Age: 63
End: 2023-10-13
Payer: COMMERCIAL

## 2023-10-17 ENCOUNTER — LAB (OUTPATIENT)
Dept: LAB | Facility: CLINIC | Age: 63
End: 2023-10-17
Payer: COMMERCIAL

## 2023-10-17 ENCOUNTER — ALLIED HEALTH/NURSE VISIT (OUTPATIENT)
Dept: FAMILY MEDICINE | Facility: CLINIC | Age: 63
End: 2023-10-17
Payer: COMMERCIAL

## 2023-10-17 DIAGNOSIS — M81.0 SENILE OSTEOPOROSIS: Primary | ICD-10-CM

## 2023-10-17 DIAGNOSIS — R73.01 ELEVATED FASTING BLOOD SUGAR: ICD-10-CM

## 2023-10-17 LAB — HBA1C MFR BLD: 5.4 % (ref 0–5.6)

## 2023-10-17 PROCEDURE — 83036 HEMOGLOBIN GLYCOSYLATED A1C: CPT

## 2023-10-17 PROCEDURE — 99207 PR NO CHARGE NURSE ONLY: CPT

## 2023-10-17 PROCEDURE — 36415 COLL VENOUS BLD VENIPUNCTURE: CPT

## 2023-10-17 PROCEDURE — 96372 THER/PROPH/DIAG INJ SC/IM: CPT | Performed by: INTERNAL MEDICINE

## 2023-10-17 NOTE — PROGRESS NOTES
Clinic Administered Medication Documentation      Prolia Documentation    Indication: Prolia  (denosumab) is a prescription medicine used to treat osteoporosis in patients who:   Are at high risk for fracture, meaning patients who have had a fracture related to osteoporosis, or who have multiple risk factors for fracture.  Cannot use another osteoporosis medicine or other osteoporosis medicines did not work well.  The timeline for early/late injections would be 4 weeks early and any time after the 6 month bettye. If a patient receives their injection late, then the subsequent injection would be 6 months from the date that they actually received the injection.    When was the last injection?  3/21/23  Was the last injection at least 6 months ago? Yes  Has the prior authorization been completed?  Yes  Is there an active order (written within the past 365 days, with administrations remaining, not ) in the chart?  Yes  Patient denies any dental work involving the bone (e.g. tooth extraction or dental implants) in the past 4 weeks?  Yes  Patient denies plans for any dental work involving the bone (e.g. tooth extraction or dental implants) in the next 4 weeks? Yes    The following steps were completed to comply with the REMS program for Prolia:  Reviewed information in the Medication Guide and Patient Counseling Chart, including the serious risks of Prolia  and the symptoms of each risk.  Advised patient to seek prompt medical attention if they have signs or symptoms of any of the serious risks.  Provided each patient a copy of the Medication Guide and Patient Brochure.    Prior to injection, verified patient identity using patient's name and date of birth. Medication was administered. Please see MAR and medication order for additional information. Patient instructed to remain in clinic for 15 minutes and report any adverse reaction to staff immediately.    Vial/Syringe: Single dose vial. Was entire vial of  medication used? Yes  Was this medication supplied by the patient? No  Verified that the patient has refills remaining in their prescription.

## 2023-11-06 ENCOUNTER — TELEPHONE (OUTPATIENT)
Dept: INTERNAL MEDICINE | Facility: CLINIC | Age: 63
End: 2023-11-06
Payer: COMMERCIAL

## 2023-11-06 DIAGNOSIS — Z78.9 STATIN INTOLERANCE: ICD-10-CM

## 2023-11-06 DIAGNOSIS — I25.10 CORONARY ARTERY CALCIFICATION SEEN ON CT SCAN: ICD-10-CM

## 2023-11-06 DIAGNOSIS — I25.10 ATHEROSCLEROSIS OF NATIVE CORONARY ARTERY OF NATIVE HEART WITHOUT ANGINA PECTORIS: Primary | ICD-10-CM

## 2023-11-08 ENCOUNTER — OFFICE VISIT (OUTPATIENT)
Dept: INTERNAL MEDICINE | Facility: CLINIC | Age: 63
End: 2023-11-08
Payer: COMMERCIAL

## 2023-11-08 VITALS
HEIGHT: 66 IN | HEART RATE: 78 BPM | TEMPERATURE: 98.3 F | DIASTOLIC BLOOD PRESSURE: 70 MMHG | OXYGEN SATURATION: 100 % | SYSTOLIC BLOOD PRESSURE: 108 MMHG | WEIGHT: 121.3 LBS | BODY MASS INDEX: 19.49 KG/M2 | RESPIRATION RATE: 18 BRPM

## 2023-11-08 DIAGNOSIS — F51.01 PRIMARY INSOMNIA: ICD-10-CM

## 2023-11-08 DIAGNOSIS — Z01.818 PREOP GENERAL PHYSICAL EXAM: Primary | ICD-10-CM

## 2023-11-08 DIAGNOSIS — D12.2 ADENOMATOUS POLYP OF ASCENDING COLON: ICD-10-CM

## 2023-11-08 DIAGNOSIS — R93.1 AGATSTON CORONARY ARTERY CALCIUM SCORE LESS THAN 100: ICD-10-CM

## 2023-11-08 DIAGNOSIS — M35.02 SJOGREN'S SYNDROME WITH LUNG INVOLVEMENT (H): ICD-10-CM

## 2023-11-08 DIAGNOSIS — M81.8 OTHER OSTEOPOROSIS WITHOUT CURRENT PATHOLOGICAL FRACTURE: Chronic | ICD-10-CM

## 2023-11-08 DIAGNOSIS — D69.1 PLATELET DISORDER (H): Chronic | ICD-10-CM

## 2023-11-08 DIAGNOSIS — J84.89 NSIP (NONSPECIFIC INTERSTITIAL PNEUMONIA) (H): Chronic | ICD-10-CM

## 2023-11-08 DIAGNOSIS — N95.2 ATROPHIC VAGINITIS: ICD-10-CM

## 2023-11-08 DIAGNOSIS — M54.2 CERVICALGIA: ICD-10-CM

## 2023-11-08 DIAGNOSIS — K21.9 GASTROESOPHAGEAL REFLUX DISEASE WITHOUT ESOPHAGITIS: ICD-10-CM

## 2023-11-08 LAB
ANION GAP SERPL CALCULATED.3IONS-SCNC: 10 MMOL/L (ref 7–15)
ATRIAL RATE - MUSE: 70 BPM
BUN SERPL-MCNC: 17.7 MG/DL (ref 8–23)
CALCIUM SERPL-MCNC: 10.5 MG/DL (ref 8.8–10.2)
CHLORIDE SERPL-SCNC: 98 MMOL/L (ref 98–107)
CREAT SERPL-MCNC: 0.94 MG/DL (ref 0.51–0.95)
DEPRECATED HCO3 PLAS-SCNC: 31 MMOL/L (ref 22–29)
DIASTOLIC BLOOD PRESSURE - MUSE: NORMAL MMHG
EGFRCR SERPLBLD CKD-EPI 2021: 68 ML/MIN/1.73M2
ERYTHROCYTE [DISTWIDTH] IN BLOOD BY AUTOMATED COUNT: 12.5 % (ref 10–15)
GLUCOSE SERPL-MCNC: 101 MG/DL (ref 70–99)
HCT VFR BLD AUTO: 43.3 % (ref 35–47)
HGB BLD-MCNC: 14.5 G/DL (ref 11.7–15.7)
INTERPRETATION ECG - MUSE: NORMAL
MCH RBC QN AUTO: 30.9 PG (ref 26.5–33)
MCHC RBC AUTO-ENTMCNC: 33.5 G/DL (ref 31.5–36.5)
MCV RBC AUTO: 92 FL (ref 78–100)
P AXIS - MUSE: 74 DEGREES
PLATELET # BLD AUTO: 130 10E3/UL (ref 150–450)
POTASSIUM SERPL-SCNC: 4.2 MMOL/L (ref 3.4–5.3)
PR INTERVAL - MUSE: 202 MS
QRS DURATION - MUSE: 82 MS
QT - MUSE: 398 MS
QTC - MUSE: 429 MS
R AXIS - MUSE: -46 DEGREES
RBC # BLD AUTO: 4.7 10E6/UL (ref 3.8–5.2)
SODIUM SERPL-SCNC: 139 MMOL/L (ref 135–145)
SYSTOLIC BLOOD PRESSURE - MUSE: NORMAL MMHG
T AXIS - MUSE: 45 DEGREES
VENTRICULAR RATE- MUSE: 70 BPM
WBC # BLD AUTO: 6.2 10E3/UL (ref 4–11)

## 2023-11-08 PROCEDURE — 80048 BASIC METABOLIC PNL TOTAL CA: CPT | Performed by: INTERNAL MEDICINE

## 2023-11-08 PROCEDURE — 85027 COMPLETE CBC AUTOMATED: CPT | Performed by: INTERNAL MEDICINE

## 2023-11-08 PROCEDURE — 93005 ELECTROCARDIOGRAM TRACING: CPT | Performed by: INTERNAL MEDICINE

## 2023-11-08 PROCEDURE — 99214 OFFICE O/P EST MOD 30 MIN: CPT | Mod: 25 | Performed by: INTERNAL MEDICINE

## 2023-11-08 PROCEDURE — 93010 ELECTROCARDIOGRAM REPORT: CPT | Performed by: INTERNAL MEDICINE

## 2023-11-08 PROCEDURE — 36415 COLL VENOUS BLD VENIPUNCTURE: CPT | Performed by: INTERNAL MEDICINE

## 2023-11-08 NOTE — TELEPHONE ENCOUNTER
PRIOR AUTHORIZATION DENIED    Medication: Repatha SureClick 140MG/ML auto-injectors      Denial Date: 11/8/2023    Denial Rational: Insurance requiring a CAC score of greater than or equal to 300 Agatston units        Appeal Information: This medication was denied. If physician would like to appeal because patient has contraindication or allergy to covered medication please write letter of medical necessity and route back to PA team to initiate.  If no further action is needed please close encounter thank you.

## 2023-11-08 NOTE — PROGRESS NOTES
[address] [address]  Phone: [phone]  Fax: [phone]  Primary Provider: Tomasa Nelson  Pre-op Performing Provider: TOMASA NELSON      PREOPERATIVE EVALUATION:  Today's date: 11/8/2023    Radha is a 63 year old female who presents for a preoperative evaluation.      11/8/2023    10:15 AM   Additional Questions   Roomed by Danielle Na   Accompanied by N/A     Surgical Information:  Surgery/Procedure: COLONOSCOPY, WITH ENDOSCOPIC MUCOSAL RESECTION   Surgery Location:  [facility]   Surgeon: Gregorio Kunz   Surgery Date: 12/1/2023   Time of Surgery: unknown  Where patient plans to recover: At home with family  Fax number for surgical facility: Note does not need to be faxed, will be available electronically in Epic.    Assessment & Plan     The proposed surgical procedure is considered INTERMEDIATE risk.    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  This is relieved with Flexeril 10 mg as needed.            Respiratory    NSIP (nonspecific interstitial pneumonia) (H) (Chronic)     Follows with Dr. Berry (pulm) and Dr. Arce (rheum), recent notes reviewed. HR CT chest stable 9/11/23.   - Stable on imuran and symbicort            Digestive    Gastroesophageal reflux disease without esophagitis     Well-controlled with as needed Pepcid 20 mg.         Adenomatous polyp of ascending colon     Cologuard positive 8/8/23, colonoscopy ordered -> 30 mm polyp 8/28/23, unable to be removed. Will be done in OR 12/1/23.             Circulatory    Agatston coronary artery calcium score less than 100     CAC score of 71 (9/19/22), unable to tolerate high dose statin so started on Repatha. Zetia started 1/2023 as LDL was in the low 100s. TSH  2.03. Last LDL 10/2023 was 59.   - Continue repatha and zetia  - Hold ASA 10 days prior to procedure            Musculoskeletal and Integumentary    Other osteoporosis without current pathological fracture (Chronic)     Doing very well on Prolia.  Last DEXA 3/2023 showed 2% increase in BMD in bilateral hips and spine.  -Last Prolia injection 10/17/23  -Next DEXA in March 2025            Immune    Sjogren's syndrome (H24)     Follows with Dr. Arce from rheumatology, June visit note reviewed.  - Continue q6 month f/u with rheum  - Continue imuran 50 mg daily            Urinary    Atrophic vaginitis     Well-controlled with Estrace cream.            Hematologic    Platelet disorder (H) (Chronic)     EDTA resistant, clumps easily.   - Platelets slightly low today, likely as a result of this, not a concern for upcoming procedure as true platelet count and clotting ability are normal and intact             Other    Insomnia     Well-controlled with nightly trazodone 200 mg and Seroquel 100 mg.         Preop general physical exam - Primary     Patient presents for pre-op prior to colonoscopy and polyp removal in OR on 12/1/23.   - EKG without signs of ischemia today  - BMP stable  - CBC with slightly low platelets, although this is most likely b/c she is EDTA resistant and not a reflection of true thrombocytopenia  - Can continue all prescription medications up to and AM of surgery aside from ASA  - Approval given prior to surgery          Relevant Orders    EKG 12-lead, tracing only (Completed)    CBC with platelets (Completed)    Basic metabolic panel (Completed)        - No identified additional risk factors other than previously addressed    Antiplatelet or Anticoagulation Medication Instructions:   - aspirin: Discontinue aspirin 7-10 days prior to procedure to reduce bleeding risk. It should be resumed postoperatively.     Additional Medication Instructions:   - Diuretics: HOLD on the day of  surgery.    RECOMMENDATION:  APPROVAL GIVEN to proceed with proposed procedure, without further diagnostic evaluation.    Ordering of each unique test  Prescription drug management      Subjective     HPI related to upcoming procedure: Cologuard positive 8/8/23, colonoscopy ordered -> 30 mm polyp 8/28/23. Removal scheduled for 12/1/23.         11/1/2023    10:17 AM   Preop Questions   1. Have you ever had a heart attack or stroke? No   2. Have you ever had surgery on your heart or blood vessels, such as a stent placement, a coronary artery bypass, or surgery on an artery in your head, neck, heart, or legs? No   3. Do you have chest pain with activity? No   4. Do you have a history of  heart failure? No   5. Do you currently have a cold, bronchitis or symptoms of other infection? No   6. Do you have a cough, shortness of breath, or wheezing? No   7. Do you or anyone in your family have previous history of blood clots? No   8. Do you or does anyone in your family have a serious bleeding problem such as prolonged bleeding following surgeries or cuts? No   9. Have you ever had problems with anemia or been told to take iron pills? No   10. Have you had any abnormal blood loss such as black, tarry or bloody stools, or abnormal vaginal bleeding? No   11. Have you ever had a blood transfusion? No   12. Are you willing to have a blood transfusion if it is medically needed before, during, or after your surgery? Yes   13. Have you or any of your relatives ever had problems with anesthesia? No   14. Do you have sleep apnea, excessive snoring or daytime drowsiness? No   15. Do you have any artifical heart valves or other implanted medical devices like a pacemaker, defibrillator, or continuous glucose monitor? No   16. Do you have artificial joints? No   17. Are you allergic to latex? No     Osteoporosis: Last prolia 10/17/23. Last DEXA 3/2023 showed 2% increase in BMD in hips and spine.       ILD/NSIP: Had repeat HR CT 9/11/23,  NSIP/fibrosis/volume loss stable. Last saw pulm (Dr. Berry) at [facility] 10/4/23. Stable with imuran 50 mg daily     COPD/restrictive lung disease: As above, saw Dr. Berry 10/4/23. Cont symbicort, trial aerobika flutter valve for intermittent wheezing.   - Didn't pick this up b/c she doesn't feel like she is having wheezing     Sjogren's: Follows with Dr. Arce (rheum), sees every 6 months, last visit 6/5/23, stable CBC and CRP normal. On imuran 50 mg daily.      CAD: CAC score of 71 (9/19/22), unable to tolerate high dose statin so started on Repatha. Zetia added in 2023. LDL 59 10/3/23. Also taking ASA 81 mg daily.      Insomnia: Seroquel 100 mg daily, trazodone 200 mg at bedtime.      Atrophic vaginitis: estrace cream      GERD: pepcid 20 mg     Muscle tightness: Flexeril 10 mg BID PRN, maybe uses a few times a month. Always upper back, likely related to playing.      Allergies: zyrtec 10 mg daily      Arthritis: Got steroid injection at [facility] in October, which actually really helped this time. She is hoping that they continue to provide pain relief through the holiday season, although will likely need joint replacements after Easter.     Health Care Directive:  Patient does not have a Health Care Directive or Living Will: Discussed advance care planning with patient; information given to patient to review.    Preoperative Review of :   reviewed - no record of controlled substances prescribed.      Review of Systems  CONSTITUTIONAL: NEGATIVE for fever, chills, change in weight  ENT/MOUTH: NEGATIVE for ear, mouth and throat problems  RESP: NEGATIVE for significant cough or SOB  CV: NEGATIVE for chest pain, palpitations or peripheral edema    Patient Active Problem List    Diagnosis Date Noted    Preop general physical exam 11/09/2023     Priority: Medium    Adenomatous polyp of ascending colon 10/03/2023     Priority: Medium    Routine general medical examination at a health care facility 03/27/2023      Priority: Medium    Gastroesophageal reflux disease without esophagitis 03/27/2023     Priority: Medium    Atrophic vaginitis 03/27/2023     Priority: Medium    Hypercalciuria 03/27/2023     Priority: Medium    Mild persistent asthma 06/09/2022     Priority: Medium    Screening for cervical cancer 03/09/2021     Priority: Medium     2015, 2017 NIL paps  2/26/21 NIL pap, neg HPV. On Prolia and Imuran. Cotest every 3 years        ILD (interstitial lung disease) (H) 02/09/2019     Priority: Medium    NSIP (nonspecific interstitial pneumonia) (H)      Priority: Medium      on VATS lung biopsy 7/2012 and has underlying connective tissue disorder,   followed by         Other osteoporosis without current pathological fracture      Priority: Medium     Started Prolia 1/2016        Raynaud's Phenomenon      Priority: Medium     Created by Conversion        Sjogren's Syndrome      Priority: Medium     Has interstitial pneumonitis dry eyes/mouth, skin rash, Raynaud's,   thrombocytopenia, burning tongue syndrome, abnormal serology including   positive TOBY, SSA antibodies, rheumatoid factor, and IgM cardiolipin   antibodies and she is followed by Dr. Arce from rheumatology   currently on prednisone and Imuran        Agatston coronary artery calcium score less than 100 06/09/2017     Priority: Medium     Score of 7 in 2017        Vasomotor Rhinitis      Priority: Medium     Created by Conversion  Replacement Utility updated for latest IMO load        Insomnia 06/08/2016     Priority: Medium     Does not get into deep/restorative sleep without medications        Platelet disorder (H) 06/08/2016     Priority: Medium     EDTA resistant        Cervicalgia      Priority: Medium     Created by Conversion        Lumbar Radiculopathy      Priority: Medium     Created by Conversion        Sjogren's syndrome (H24) 02/07/2014     Priority: Medium     Formatting of this note might be different from the original.  Has  interstitial pneumonitis dry eyes/mouth, skin rash, Raynaud's, thrombocytopenia, burning tongue syndrome, abnormal serology including positive TOBY, SSA antibodies, rheumatoid factor, and IgM cardiolipin antibodies and she is followed by Dr. Arce from rheumatology currently on prednisone and Imuran        Past Medical History:   Diagnosis Date    Arthritis 6/1/1999    Cervicalgia     Heart disease 7/13/2021    Calcification on an aorta    Idiopathic thrombocytopenic purpura (H)     Interstitial lung disease (H)     Interstitial pulmonary disease (H)     Lumbar radiculopathy     Osteopenia     Raynaud phenomenon     Right shoulder pain     Sjoegren syndrome     Uncomplicated asthma 6/9/2022    Vasomotor rhinitis      Past Surgical History:   Procedure Laterality Date    BIOPSY  7/2012, 11/7/2022    surgical lung biopsy, uterine biopsy    COLONOSCOPY N/A 08/29/2023    Procedure: Colonoscopy WITH POLYPECTOMY;  Surgeon: Eda Clements MD;  Location: [facility] OR    EYE SURGERY  3/3/22    cataract    HC REPAIR OF NASAL SEPTUM      Description: Septoplasty;  Recorded: 07/12/2012;    IR CERVICAL EPIDURAL STEROID INJECTION  03/23/2009    ORTHOPEDIC SURGERY  7/2015, 12/29/21    shoulder, Guyan canal release    IN THORACOSCOPY W/DX BX OF LUNG INFILTRATE UNILATRL      Description: Thoracoscopy Of Lungs And Pleural Space With Biopsy Of Lung Infiltrates;  Recorded: 07/23/2012;     Current Outpatient Medications   Medication Sig Dispense Refill    aspirin 81 MG EC tablet Take 81 mg by mouth daily      azaTHIOprine (IMURAN) 50 mg tablet Take 1 tablet by mouth daily       calcium carbonate (OS-KOLE) 600 mg calcium (1,500 mg) tablet Take 600 mg by mouth 2 times daily (with meals)      cetirizine (ZYRTEC) 10 MG tablet Take 10 mg by mouth daily      cyclobenzaprine (FLEXERIL) 10 MG tablet Take 1 tablet (10 mg) by mouth 2 times daily as needed for muscle spasms 90 tablet 3    estradiol (ESTRACE) 0.1 MG/GM vaginal cream INSERT 1 GRAM  INTO THE VAGINA 1-3 TIMES PER WEEK. 42.5 g 3    ezetimibe (ZETIA) 10 MG tablet Take 1 tablet (10 mg) by mouth daily 90 tablet 3    famotidine (PEPCID) 20 MG tablet Take 20 mg by mouth At Bedtime      Flaxseed, Linseed, (FLAX SEED OIL) 1000 MG capsule Take 1 capsule by mouth daily For dry eyes       magnesium chloride 535 (64 Mg) MG TBEC CR tablet Take 535 mg by mouth daily      potassium chloride ER (KLOR-CON M) 20 MEQ CR tablet TAKE 1 TABLET (20 MEQ TOTAL) BY MOUTH DAILY. Strength: 20 mEq 90 tablet 3    QUEtiapine (SEROQUEL) 100 MG tablet Take 1 tablet (100 mg) by mouth At Bedtime 90 tablet 3    spironolactone-HCTZ (ALDACTAZIDE) 25-25 MG tablet Take 1 tablet by mouth daily 90 tablet 3    SYMBICORT 80-4.5 MCG/ACT Inhaler INHALE 2 PUFFS BY MOUTH 2 TIMES DAILY.      traZODone (DESYREL) 100 MG tablet Take 2 tablets (200 mg) by mouth At Bedtime 180 tablet 3    tretinoin (RETIN-A) 0.025 % external cream APPLY TOPICALLY TO AFFECTED AREA OF FACE AT BEDTIME      evolocumab (REPATHA) 140 MG/ML prefilled autoinjector Inject 1 mL (140 mg) Subcutaneous every 14 days 6 mL 3       Allergies   Allergen Reactions    Crestor [Rosuvastatin] Cramps     Tolerating 20mg dose         Social History     Tobacco Use    Smoking status: Never     Passive exposure: Never    Smokeless tobacco: Never   Substance Use Topics    Alcohol use: Yes     Comment: 4-5 drinks per week     Family History   Problem Relation Age of Onset    Cancer Father         Mesothelioma,  age 71    Hyperlipidemia Father     Prostate Cancer Father     Other Cancer Father         Mesothelioma    Diabetes Brother          age 43    Diabetes Type 1 Son     Diabetes Son     Depression Son     Anxiety Disorder Son     Parkinsonism Mother     Osteoporosis Mother     Alcoholism Brother     Cerebrovascular Disease Maternal Grandmother     Coronary Artery Disease Brother     Hypertension Brother     Hyperlipidemia Brother      History   Drug Use No         Objective  "    /70 (BP Location: Right arm, Patient Position: Sitting, Cuff Size: Adult Regular)   Pulse 78   Temp 98.3  F (36.8  C) (Oral)   Resp 18   Ht 1.676 m (5' 6\")   Wt 55 kg (121 lb 4.8 oz)   LMP  (LMP Unknown)   SpO2 100%   BMI 19.58 kg/m      Physical Exam    GENERAL APPEARANCE: healthy, alert and no distress     EYES: EOMI, PERRL     HENT: nose and mouth without ulcers or lesions     RESP: lungs clear to auscultation - no rales, rhonchi or wheezes     CV: regular rates and rhythm, normal S1 S2, no S3 or S4 and no murmur, click or rub     ABDOMEN:  soft, nontender, no HSM or masses and bowel sounds normal     MS: extremities normal- no gross deformities noted, no evidence of inflammation in joints, FROM in all extremities.     SKIN: no suspicious lesions or rashes     NEURO: Normal strength and tone, sensory exam grossly normal, mentation intact and speech normal     PSYCH: mentation appears normal. and affect normal/bright     LYMPHATICS: No cervical adenopathy    Recent Labs   Lab Test 10/17/23  1319 10/03/23  0935 03/27/23  0938 09/06/22  0913 03/01/22  0941   HGB  --   --  14.9  --  14.9   PLT  --   --  152  --  77*   NA  --  140 141   < >  --    POTASSIUM  --  3.9 4.1   < >  --    CR  --  0.93 1.11*   < >  --    A1C 5.4  --   --   --   --     < > = values in this interval not displayed.        Diagnostics:  Recent Results (from the past 48 hour(s))   EKG 12-lead, tracing only    Collection Time: 11/08/23 11:02 AM   Result Value Ref Range    Systolic Blood Pressure  mmHg    Diastolic Blood Pressure  mmHg    Ventricular Rate 70 BPM    Atrial Rate 70 BPM    MS Interval 202 ms    QRS Duration 82 ms     ms    QTc 429 ms    P Axis 74 degrees    R AXIS -46 degrees    T Axis 45 degrees    Interpretation ECG       Sinus rhythm  Left axis deviation  Septal infarct , age undetermined  Abnormal ECG  No previous ECGs available  Confirmed by PANCHO HAIRSTON, LES LOC:OTIS ([id number]) on 11/8/2023 2:26:26 PM     CBC " with platelets    Collection Time: 11/08/23 11:25 AM   Result Value Ref Range    WBC Count 6.2 4.0 - 11.0 10e3/uL    RBC Count 4.70 3.80 - 5.20 10e6/uL    Hemoglobin 14.5 11.7 - 15.7 g/dL    Hematocrit 43.3 35.0 - 47.0 %    MCV 92 78 - 100 fL    MCH 30.9 26.5 - 33.0 pg    MCHC 33.5 31.5 - 36.5 g/dL    RDW 12.5 10.0 - 15.0 %    Platelet Count 130 (L) 150 - 450 10e3/uL   Basic metabolic panel    Collection Time: 11/08/23 11:25 AM   Result Value Ref Range    Sodium 139 135 - 145 mmol/L    Potassium 4.2 3.4 - 5.3 mmol/L    Chloride 98 98 - 107 mmol/L    Carbon Dioxide (CO2) 31 (H) 22 - 29 mmol/L    Anion Gap 10 7 - 15 mmol/L    Urea Nitrogen 17.7 8.0 - 23.0 mg/dL    Creatinine 0.94 0.51 - 0.95 mg/dL    GFR Estimate 68 >60 mL/min/1.73m2    Calcium 10.5 (H) 8.8 - 10.2 mg/dL    Glucose 101 (H) 70 - 99 mg/dL        Revised Cardiac Risk Index (RCRI):  The patient has the following serious cardiovascular risks for perioperative complications:   - High risk surgery (>5% cardiac complication risk) = 1 point     RCRI Interpretation: 1 point: Class II (low risk - 0.9% complication rate)         Signed Electronically by: Deisi Marin MD  Copy of this evaluation report is provided to requesting physician.

## 2023-11-08 NOTE — TELEPHONE ENCOUNTER
Central Prior Authorization Team   Phone: [phone]    PA Initiation    Medication: Repatha SureClick 140MG/ML auto-injectors    Insurance Company: [employer] Non-Specialty PA's - Phone [phone] Fax [phone]  Pharmacy Filling the Rx: [id number] - [place], [place] - [address]  Filling Pharmacy Phone: [phone]  Filling Pharmacy Fax:    Start Date: 11/8/2023

## 2023-11-08 NOTE — PATIENT INSTRUCTIONS
Preparing for Your Surgery  How to Take Your Medication Before Surgery  - Hold aspirin 10 days prior to surgery, last day will be  (Monday)   - Hold hydrochlorothiazide/spironolactone morning of surgery   Getting started  A nurse will call you to review your health history and instructions. They will give you an arrival time based on your scheduled surgery time. Please be ready to share:  Your doctor's clinic name and phone number  Your medical, surgical, and anesthesia history  A list of allergies and sensitivities  A list of medicines, including herbal treatments and over-the-counter drugs  Whether the patient has a legal guardian (ask how to send us the papers in advance)  Please tell us if you're pregnant--or if there's any chance you might be pregnant. Some surgeries may injure a fetus (unborn baby), so they require a pregnancy test. Surgeries that are safe for a fetus don't always need a test, and you can choose whether to have one.   If you have a child who's having surgery, please ask for a copy of Preparing for Your Child's Surgery.    Preparing for surgery  Within 10 to 30 days of surgery: Have a pre-op exam (sometimes called an H&P, or History and Physical). This can be done at a clinic or pre-operative center.  If you're having a , you may not need this exam. Talk to your care team.  At your pre-op exam, talk to your care team about all medicines you take. If you need to stop any medicines before surgery, ask when to start taking them again.  We do this for your safety. Many medicines can make you bleed too much during surgery. Some change how well surgery (anesthesia) drugs work.  Call your insurance company to let them know you're having surgery. (If you don't have insurance, call [phone].)  Call your clinic if there's any change in your health. This includes signs of a cold or flu (sore throat, runny nose, cough, rash, fever). It also includes a scrape or scratch near the surgery  site.  If you have questions on the day of surgery, call your hospital or surgery center.  Eating and drinking guidelines  For your safety: Unless your surgeon tells you otherwise, follow the guidelines below.  Eat and drink as usual until 8 hours before you arrive for surgery. After that, no food or milk.  Drink clear liquids until 2 hours before you arrive. These are liquids you can see through, like water, Gatorade, and Propel Water. They also include plain black coffee and tea (no cream or milk), candy, and breath mints. You can spit out gum when you arrive.  If you drink alcohol: Stop drinking it the night before surgery.  If your care team tells you to take medicine on the morning of surgery, it's okay to take it with a sip of water.  Preventing infection  Shower or bathe the night before and morning of your surgery. Follow the instructions your clinic gave you. (If no instructions, use regular soap.)  Don't shave or clip hair near your surgery site. We'll remove the hair if needed.  Don't smoke or vape the morning of surgery. You may chew nicotine gum up to 2 hours before surgery. A nicotine patch is okay.  Note: Some surgeries require you to completely quit smoking and nicotine. Check with your surgeon.  Your care team will make every effort to keep you safe from infection. We will:  Clean our hands often with soap and water (or an alcohol-based hand rub).  Clean the skin at your surgery site with a special soap that kills germs.  Give you a special gown to keep you warm. (Cold raises the risk of infection.)  Wear special hair covers, masks, gowns and gloves during surgery.  Give antibiotic medicine, if prescribed. Not all surgeries need antibiotics.  What to bring on the day of surgery  Photo ID and insurance card  Copy of your health care directive, if you have one  Glasses and hearing aids (bring cases)  You can't wear contacts during surgery  Inhaler and eye drops, if you use them (tell us about these  when you arrive)  CPAP machine or breathing device, if you use them  A few personal items, if spending the night  If you have . . .  A pacemaker, ICD (cardiac defibrillator) or other implant: Bring the ID card.  An implanted stimulator: Bring the remote control.  A legal guardian: Bring a copy of the certified (court-stamped) guardianship papers.  Please remove any jewelry, including body piercings. Leave jewelry and other valuables at home.  If you're going home the day of surgery  You must have a responsible adult drive you home. They should stay with you overnight as well.  If you don't have someone to stay with you, and you aren't safe to go home alone, we may keep you overnight. Insurance often won't pay for this.  After surgery  If it's hard to control your pain or you need more pain medicine, please call your surgeon's office.  Questions?   If you have any questions for your care team, list them here: _________________________________________________________________________________________________________________________________________________________________________

## 2023-11-08 NOTE — TELEPHONE ENCOUNTER
This was approved last year with an alternate diagnosis, which I just added. Can we resend the PA? Hopefully that will work and we don't have to appeal.

## 2023-11-09 PROBLEM — Z01.818 PREOP GENERAL PHYSICAL EXAM: Status: ACTIVE | Noted: 2023-11-09

## 2023-11-09 NOTE — ASSESSMENT & PLAN NOTE
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  This is relieved with Flexeril 10 mg as needed.

## 2023-11-09 NOTE — TELEPHONE ENCOUNTER
Called [employer] about the appeal process, let them know patient has been stable on this medication with diagnosis of CAD, hypelipidemia and is intolerant of statins.     Rep transferred me to the appeals line to start the appeals process.   They will be faxing paperwork for me to fill out.     Once completed I will fax this back to [employer] appeals.     Medication Appeal Initiation    We have initiated an appeal for the requested medication:  Medication: Repatha SureClick 140MG/ML auto-injectors    Appeal Start Date:  11/9/2023  Insurance Company:  [employer]  Comments:

## 2023-11-09 NOTE — ASSESSMENT & PLAN NOTE
CAC score of 71 (9/19/22), unable to tolerate high dose statin so started on Repatha. Zetia started 1/2023 as LDL was in the low 100s. TSH 2.03. Last LDL 10/2023 was 59.   - Continue repatha and zetia  - Hold ASA 10 days prior to procedure

## 2023-11-09 NOTE — ASSESSMENT & PLAN NOTE
Cologuard positive 8/8/23, colonoscopy ordered -> 30 mm polyp 8/28/23, unable to be removed. Will be done in OR 12/1/23.

## 2023-11-09 NOTE — ASSESSMENT & PLAN NOTE
Doing very well on Prolia.  Last DEXA 3/2023 showed 2% increase in BMD in bilateral hips and spine.  -Last Prolia injection 10/17/23  -Next DEXA in March 2025

## 2023-11-09 NOTE — ASSESSMENT & PLAN NOTE
Follows with Dr. Berry (pulm) and Dr. Arce (rheum), recent notes reviewed. HR CT chest stable 9/11/23.   - Stable on imuran and symbicort

## 2023-11-09 NOTE — ASSESSMENT & PLAN NOTE
Patient presents for pre-op prior to colonoscopy and polyp removal in OR on 12/1/23.   - EKG without signs of ischemia today  - BMP stable  - CBC with slightly low platelets, although this is most likely b/c she is EDTA resistant and not a reflection of true thrombocytopenia  - Can continue all prescription medications up to and AM of surgery aside from ASA  - Approval given prior to surgery

## 2023-11-09 NOTE — TELEPHONE ENCOUNTER
Insurance is now requiring patient to have met certain requirements for the diagnosis of CAD or hyperlipidemia.     For diagnosis of CAD patient will need a previous myocardial infarction (MI) or history of an acute coronary syndrome (ACS), angina (stable or unstable),  history of stroke or transient ischemic attack (TIA),  history of stroke or transient ischemic attack (TIA), or undergone a coronary or other arterial revascularization procedure in the past. examples include coronary artery bypass graft [CABG] surgery, percutaneous coronary intervention [PCI], angioplasty, and coronary stent procedures.    ASCVD diagnosed by artery calcification seen on CT scan was not accepted by insurance.     For diagnosis of hyperlipemia patient will need a CAC score of 300 Agatston units or higher.     For diagnosis of familial hypercholesterolemia patient will need to have a untreated LDL-c of 190 or higher.     Criteria above was not found in patient's chart.     If provider would like to appeal please provide a letter of medical necessity and route back to me and I can initiate the appeals process.

## 2023-11-09 NOTE — ASSESSMENT & PLAN NOTE
EDTA resistant, clumps easily.   - Platelets slightly low today, likely as a result of this, not a concern for upcoming procedure as true platelet count and clotting ability are normal and intact

## 2023-11-10 NOTE — TELEPHONE ENCOUNTER
MEDICATION APPEAL APPROVED    Medication: Repatha SureClick 140MG/ML auto-injectors    Authorization Effective Date:  10/10/2023  Authorization Expiration Date:  11/10/2024  Approved Dose/Quantity:   Reference #:     Insurance Company:    Expected CoPay:       CoPay Card Available:      Foundation Assistance Needed:    Which Pharmacy is filling the prescription (Not needed for infusion/clinic administered): [facility]       Pharmacy will contact patient when this is ready for .

## 2023-11-27 ENCOUNTER — MYC MEDICAL ADVICE (OUTPATIENT)
Dept: INTERNAL MEDICINE | Facility: CLINIC | Age: 63
End: 2023-11-27
Payer: COMMERCIAL

## 2023-11-27 DIAGNOSIS — I25.10 ATHEROSCLEROSIS OF NATIVE CORONARY ARTERY OF NATIVE HEART WITHOUT ANGINA PECTORIS: ICD-10-CM

## 2023-11-27 DIAGNOSIS — Z78.9 STATIN INTOLERANCE: ICD-10-CM

## 2023-11-28 ENCOUNTER — TRANSFERRED RECORDS (OUTPATIENT)
Dept: HEALTH INFORMATION MANAGEMENT | Facility: CLINIC | Age: 63
End: 2023-11-28
Payer: COMMERCIAL

## 2023-11-28 LAB
ALT SERPL-CCNC: 13 IU/L (ref 0–35)
AST SERPL-CCNC: 29 U/L (ref 5–34)
CREATININE (EXTERNAL): 0.93 MG/DL (ref 0.5–1.3)
GFR ESTIMATED (EXTERNAL): 64.7 ML/MIN/1.73M2

## 2023-11-30 DIAGNOSIS — R11.0 NAUSEA: Primary | ICD-10-CM

## 2023-11-30 RX ORDER — ONDANSETRON 4 MG/1
4 TABLET, ORALLY DISINTEGRATING ORAL EVERY 6 HOURS PRN
Qty: 3 TABLET | Refills: 0 | Status: SHIPPED | OUTPATIENT
Start: 2023-11-30

## 2023-12-01 ENCOUNTER — ANESTHESIA (OUTPATIENT)
Dept: SURGERY | Facility: CLINIC | Age: 63
End: 2023-12-01
Payer: COMMERCIAL

## 2023-12-01 ENCOUNTER — ANESTHESIA EVENT (OUTPATIENT)
Dept: SURGERY | Facility: CLINIC | Age: 63
End: 2023-12-01
Payer: COMMERCIAL

## 2023-12-01 ENCOUNTER — HOSPITAL ENCOUNTER (OUTPATIENT)
Facility: CLINIC | Age: 63
Discharge: HOME | End: 2023-12-01
Attending: INTERNAL MEDICINE | Admitting: INTERNAL MEDICINE
Payer: COMMERCIAL

## 2023-12-01 VITALS
HEIGHT: 66 IN | DIASTOLIC BLOOD PRESSURE: 59 MMHG | HEART RATE: 80 BPM | TEMPERATURE: 97.6 F | OXYGEN SATURATION: 97 % | BODY MASS INDEX: 19.52 KG/M2 | WEIGHT: 121.47 LBS | SYSTOLIC BLOOD PRESSURE: 115 MMHG | RESPIRATION RATE: 14 BRPM

## 2023-12-01 LAB — COLONOSCOPY: NORMAL

## 2023-12-01 PROCEDURE — 258N000003 HC RX IP 258 OP 636: Performed by: NURSE ANESTHETIST, CERTIFIED REGISTERED

## 2023-12-01 PROCEDURE — 370N000017 HC ANESTHESIA TECHNICAL FEE, PER MIN: Performed by: INTERNAL MEDICINE

## 2023-12-01 PROCEDURE — 250N000009 HC RX 250: Performed by: NURSE ANESTHETIST, CERTIFIED REGISTERED

## 2023-12-01 PROCEDURE — 250N000009 HC RX 250: Performed by: INTERNAL MEDICINE

## 2023-12-01 PROCEDURE — 710N000012 HC RECOVERY PHASE 2, PER MINUTE: Performed by: INTERNAL MEDICINE

## 2023-12-01 PROCEDURE — 272N000001 HC OR GENERAL SUPPLY STERILE: Performed by: INTERNAL MEDICINE

## 2023-12-01 PROCEDURE — 272N000002 HC OR SUPPLY OTHER OPNP: Performed by: INTERNAL MEDICINE

## 2023-12-01 PROCEDURE — 999N000141 HC STATISTIC PRE-PROCEDURE NURSING ASSESSMENT: Performed by: INTERNAL MEDICINE

## 2023-12-01 PROCEDURE — 360N000075 HC SURGERY LEVEL 2, PER MIN: Performed by: INTERNAL MEDICINE

## 2023-12-01 PROCEDURE — 250N000011 HC RX IP 250 OP 636: Mod: JZ | Performed by: NURSE ANESTHETIST, CERTIFIED REGISTERED

## 2023-12-01 PROCEDURE — 88305 TISSUE EXAM BY PATHOLOGIST: CPT | Mod: 26 | Performed by: PATHOLOGY

## 2023-12-01 PROCEDURE — 88307 TISSUE EXAM BY PATHOLOGIST: CPT | Mod: TC | Performed by: INTERNAL MEDICINE

## 2023-12-01 RX ORDER — NALOXONE HYDROCHLORIDE 0.4 MG/ML
0.2 INJECTION, SOLUTION INTRAMUSCULAR; INTRAVENOUS; SUBCUTANEOUS
Status: CANCELLED | OUTPATIENT
Start: 2023-12-01

## 2023-12-01 RX ORDER — SODIUM CHLORIDE, SODIUM LACTATE, POTASSIUM CHLORIDE, CALCIUM CHLORIDE 600; 310; 30; 20 MG/100ML; MG/100ML; MG/100ML; MG/100ML
INJECTION, SOLUTION INTRAVENOUS CONTINUOUS PRN
Status: DISCONTINUED | OUTPATIENT
Start: 2023-12-01 | End: 2023-12-01

## 2023-12-01 RX ORDER — PROCHLORPERAZINE MALEATE 5 MG
10 TABLET ORAL EVERY 6 HOURS PRN
Status: CANCELLED | OUTPATIENT
Start: 2023-12-01

## 2023-12-01 RX ORDER — PROPOFOL 10 MG/ML
INJECTION, EMULSION INTRAVENOUS PRN
Status: DISCONTINUED | OUTPATIENT
Start: 2023-12-01 | End: 2023-12-01

## 2023-12-01 RX ORDER — ONDANSETRON 2 MG/ML
4 INJECTION INTRAMUSCULAR; INTRAVENOUS EVERY 30 MIN PRN
Status: CANCELLED | OUTPATIENT
Start: 2023-12-01

## 2023-12-01 RX ORDER — FLUMAZENIL 0.1 MG/ML
0.2 INJECTION, SOLUTION INTRAVENOUS
Status: CANCELLED | OUTPATIENT
Start: 2023-12-01 | End: 2023-12-02

## 2023-12-01 RX ORDER — OXYCODONE HYDROCHLORIDE 5 MG/1
5 TABLET ORAL
Status: CANCELLED | OUTPATIENT
Start: 2023-12-01

## 2023-12-01 RX ORDER — NALOXONE HYDROCHLORIDE 0.4 MG/ML
0.4 INJECTION, SOLUTION INTRAMUSCULAR; INTRAVENOUS; SUBCUTANEOUS
Status: CANCELLED | OUTPATIENT
Start: 2023-12-01

## 2023-12-01 RX ORDER — OXYCODONE HYDROCHLORIDE 10 MG/1
10 TABLET ORAL
Status: CANCELLED | OUTPATIENT
Start: 2023-12-01

## 2023-12-01 RX ORDER — ONDANSETRON 4 MG/1
4 TABLET, ORALLY DISINTEGRATING ORAL EVERY 6 HOURS PRN
Status: CANCELLED | OUTPATIENT
Start: 2023-12-01

## 2023-12-01 RX ORDER — ONDANSETRON 2 MG/ML
4 INJECTION INTRAMUSCULAR; INTRAVENOUS EVERY 6 HOURS PRN
Status: CANCELLED | OUTPATIENT
Start: 2023-12-01

## 2023-12-01 RX ORDER — ONDANSETRON 2 MG/ML
4 INJECTION INTRAMUSCULAR; INTRAVENOUS
Status: DISCONTINUED | OUTPATIENT
Start: 2023-12-01 | End: 2023-12-01 | Stop reason: HOSPADM

## 2023-12-01 RX ORDER — LIDOCAINE 40 MG/G
CREAM TOPICAL
Status: DISCONTINUED | OUTPATIENT
Start: 2023-12-01 | End: 2023-12-01 | Stop reason: HOSPADM

## 2023-12-01 RX ORDER — ONDANSETRON 2 MG/ML
INJECTION INTRAMUSCULAR; INTRAVENOUS PRN
Status: DISCONTINUED | OUTPATIENT
Start: 2023-12-01 | End: 2023-12-01

## 2023-12-01 RX ORDER — PROPOFOL 10 MG/ML
INJECTION, EMULSION INTRAVENOUS CONTINUOUS PRN
Status: DISCONTINUED | OUTPATIENT
Start: 2023-12-01 | End: 2023-12-01

## 2023-12-01 RX ORDER — ONDANSETRON 4 MG/1
4 TABLET, ORALLY DISINTEGRATING ORAL EVERY 30 MIN PRN
Status: CANCELLED | OUTPATIENT
Start: 2023-12-01

## 2023-12-01 RX ORDER — LIDOCAINE HYDROCHLORIDE 20 MG/ML
INJECTION, SOLUTION INFILTRATION; PERINEURAL PRN
Status: DISCONTINUED | OUTPATIENT
Start: 2023-12-01 | End: 2023-12-01

## 2023-12-01 RX ADMIN — LIDOCAINE HYDROCHLORIDE 20 MG: 20 INJECTION, SOLUTION INFILTRATION; PERINEURAL at 13:07

## 2023-12-01 RX ADMIN — ONDANSETRON 4 MG: 2 INJECTION INTRAMUSCULAR; INTRAVENOUS at 13:20

## 2023-12-01 RX ADMIN — SODIUM CHLORIDE, POTASSIUM CHLORIDE, SODIUM LACTATE AND CALCIUM CHLORIDE: 600; 310; 30; 20 INJECTION, SOLUTION INTRAVENOUS at 12:49

## 2023-12-01 RX ADMIN — PROPOFOL 100 MCG/KG/MIN: 10 INJECTION, EMULSION INTRAVENOUS at 13:00

## 2023-12-01 RX ADMIN — PROPOFOL 20 MG: 10 INJECTION, EMULSION INTRAVENOUS at 13:07

## 2023-12-01 RX ADMIN — PROPOFOL 20 MG: 10 INJECTION, EMULSION INTRAVENOUS at 13:20

## 2023-12-01 RX ADMIN — LIDOCAINE HYDROCHLORIDE 80 MG: 20 INJECTION, SOLUTION INFILTRATION; PERINEURAL at 13:00

## 2023-12-01 RX ADMIN — PROPOFOL 30 MG: 10 INJECTION, EMULSION INTRAVENOUS at 13:00

## 2023-12-01 RX ADMIN — MIDAZOLAM 1 MG: 1 INJECTION INTRAMUSCULAR; INTRAVENOUS at 12:51

## 2023-12-01 ASSESSMENT — ACTIVITIES OF DAILY LIVING (ADL)
ADLS_ACUITY_SCORE: 35
ADLS_ACUITY_SCORE: 35
ADLS_ACUITY_SCORE: 37

## 2023-12-01 NOTE — ANESTHESIA PREPROCEDURE EVALUATION
Anesthesia Pre-Procedure Evaluation    Patient: Radha Morrison   MRN: [id number] : 1960        Procedure : Procedure(s):  COLONOSCOPY, WITH ENDOSCOPIC MUCOSAL RESECTION          Past Medical History:   Diagnosis Date    Arthritis 1999    Cervicalgia     Heart disease 2021    Calcification on an aorta    Idiopathic thrombocytopenic purpura (H)     Interstitial lung disease (H)     Interstitial pulmonary disease (H)     Lumbar radiculopathy     Osteopenia     Raynaud phenomenon     Right shoulder pain     Sjoegren syndrome     Uncomplicated asthma 2022    Vasomotor rhinitis       Past Surgical History:   Procedure Laterality Date    BIOPSY  2012, 2022    surgical lung biopsy, uterine biopsy    COLONOSCOPY N/A 2023    Procedure: Colonoscopy WITH POLYPECTOMY;  Surgeon: Eda Clements MD;  Location: [facility] OR    EYE SURGERY  3/3/22    cataract    HC REPAIR OF NASAL SEPTUM      Description: Septoplasty;  Recorded: 2012;    IR CERVICAL EPIDURAL STEROID INJECTION  2009    ORTHOPEDIC SURGERY  2015, 21    shoulder, Guyan canal release    PA THORACOSCOPY W/DX BX OF LUNG INFILTRATE UNILATRL      Description: Thoracoscopy Of Lungs And Pleural Space With Biopsy Of Lung Infiltrates;  Recorded: 2012;      Allergies   Allergen Reactions    Crestor [Rosuvastatin] Cramps     Tolerating 20mg dose       Social History     Tobacco Use    Smoking status: Never     Passive exposure: Never    Smokeless tobacco: Never   Substance Use Topics    Alcohol use: Yes     Comment: 4-5 drinks per week      Wt Readings from Last 1 Encounters:   23 55.1 kg (121 lb 7.6 oz)        Current Outpatient Medications   Medication Instructions    aspirin 81 mg, Oral, DAILY    azaTHIOprine (IMURAN) 50 mg tablet 1 tablet, Oral, DAILY    calcium 600 mg, Oral, 2 TIMES DAILY WITH MEALS    cetirizine (ZYRTEC) 10 mg, Oral, DAILY    cyclobenzaprine (FLEXERIL) 10 mg, Oral, 2 TIMES DAILY PRN    estradiol  (ESTRACE) 0.1 MG/GM vaginal cream INSERT 1 GRAM INTO THE VAGINA 1-3 TIMES PER WEEK.    evolocumab (REPATHA) 140 mg, Subcutaneous, EVERY 14 DAYS    ezetimibe (ZETIA) 10 mg, Oral, DAILY    famotidine (PEPCID) 20 mg, Oral, AT BEDTIME    Flaxseed, Linseed, (FLAX SEED OIL) 1000 MG capsule 1 capsule, Oral, DAILY, For dry eyes     magnesium chloride 535 mg, Oral, DAILY    ondansetron (ZOFRAN ODT) 4 mg, Oral, EVERY 6 HOURS PRN    potassium chloride ER (KLOR-CON M) 20 MEQ CR tablet TAKE 1 TABLET (20 MEQ TOTAL) BY MOUTH DAILY. Strength: 20 mEq    QUEtiapine (SEROQUEL) 100 mg, Oral, AT BEDTIME    spironolactone-HCTZ (ALDACTAZIDE) 25-25 MG tablet 1 tablet, Oral, DAILY    SYMBICORT 80-4.5 MCG/ACT Inhaler INHALE 2 PUFFS BY MOUTH 2 TIMES DAILY.    traZODone (DESYREL) 200 mg, Oral, AT BEDTIME    tretinoin (RETIN-A) 0.025 % external cream APPLY TOPICALLY TO AFFECTED AREA OF FACE AT BEDTIME       Anesthesia Evaluation            ROS/MED HX  ENT/Pulmonary:     (+)                    Mild Persistent, asthma  Treatment: Inhaler daily,                 Neurologic:  - neg neurologic ROS     Cardiovascular:  - neg cardiovascular ROS     METS/Exercise Tolerance: >4 METS    Hematologic:  - neg hematologic  ROS     Musculoskeletal:  - neg musculoskeletal ROS     GI/Hepatic:     (+) GERD,                   Renal/Genitourinary:  - neg Renal ROS     Endo:  - neg endo ROS     Psychiatric/Substance Use:  - neg psychiatric ROS     Infectious Disease:  - neg infectious disease ROS     Malignancy:  - neg malignancy ROS     Other:  - neg other ROS          Physical Exam    Airway        Mallampati: I   TM distance: > 3 FB   Neck ROM: full   Mouth opening: > 3 cm    Respiratory Devices and Support         Dental       (+) Minor Abnormalities - some fillings, tiny chips      Cardiovascular   cardiovascular exam normal          Pulmonary   pulmonary exam normal                OUTSIDE LABS:  CBC:   Lab Results   Component Value Date    WBC 6.2 11/08/2023  "   WBC 5.3 03/27/2023    HGB 14.5 11/08/2023    HGB 14.9 03/27/2023    HCT 43.3 11/08/2023    HCT 44.7 03/27/2023     (L) 11/08/2023     03/27/2023     BMP:   Lab Results   Component Value Date     11/08/2023     10/03/2023    POTASSIUM 4.2 11/08/2023    POTASSIUM 3.9 10/03/2023    CHLORIDE 98 11/08/2023    CHLORIDE 101 10/03/2023    CO2 31 (H) 11/08/2023    CO2 29 10/03/2023    BUN 17.7 11/08/2023    BUN 15.8 10/03/2023    CR 0.94 11/08/2023    CR 0.93 10/03/2023     (H) 11/08/2023     (H) 10/03/2023     COAGS: No results found for: \"PTT\", \"INR\", \"FIBR\"  POC: No results found for: \"BGM\", \"HCG\", \"HCGS\"  HEPATIC:   Lab Results   Component Value Date    ALBUMIN 4.6 03/27/2023    PROTTOTAL 7.0 03/27/2023    ALT 15 03/27/2023    AST 31 03/27/2023    ALKPHOS 48 03/27/2023    BILITOTAL 0.3 03/27/2023     OTHER:   Lab Results   Component Value Date    A1C 5.4 10/17/2023    KOLE 10.5 (H) 11/08/2023    MAG 2.0 09/06/2022    TSH 2.03 01/16/2023     ECG results from 11/08/23   EKG 12-lead, tracing only     Value    Systolic Blood Pressure     Diastolic Blood Pressure     Ventricular Rate 70    Atrial Rate 70    WY Interval 202    QRS Duration 82        QTc 429    P Axis 74    R AXIS -46    T Axis 45    Interpretation ECG      Sinus rhythm  Left axis deviation  Septal infarct , age undetermined  Abnormal ECG  No previous ECGs available  Confirmed by PANCHO HAIRSTON, AMARIS LOC:OTIS ([id number]) on 11/8/2023 2:26:26 PM           Anesthesia Plan    ASA Status:  2    NPO Status:  NPO Appropriate    Anesthesia Type: MAC.     - Reason for MAC: straight local not clinically adequate   Induction: Intravenous, Propofol.   Maintenance: TIVA.        Consents    Anesthesia Plan(s) and associated risks, benefits, and realistic alternatives discussed. Questions answered and patient/representative(s) expressed understanding.     - Discussed:     - Discussed with:  Patient      - Extended Intubation/Ventilatory " Support Discussed: No.      - Patient is DNR/DNI Status: No     Use of blood products discussed: No .     Postoperative Care    Pain management: IV analgesics, Oral pain medications.   PONV prophylaxis: Ondansetron (or other 5HT-3)     Comments:               Kellee Hamilton MD    I have reviewed the pertinent notes and labs in the chart from the past 30 days and (re)examined the patient.  Any updates or changes from those notes are reflected in this note.      # Hypercalcemia: Highest Ca = 10.5 mg/dL in last 30 days, will monitor as appropriate        # Drug Induced Platelet Defect: home medication list includes an antiplatelet medication

## 2023-12-01 NOTE — OP NOTE
COLONOSCOPY 12/01/2023 12:43 PM [address] [place] [address] [phone]     Endoscopy Department  _______________________________________________________________________________  Patient Name: Radha Morrison            Procedure Date: 12/1/2023 12:43 PM  MRN: [id number]                       Account Number: [id number]  YOB: 1960              Admit Type: Outpatient  Age: 63                               Room: [place] [address]  Gender: Female                        Note Status: Finalized  Attending MD: LAUREN MCFARLAND MD,      Pause for the Cause: time out performed  Total Sedation Time:                    _______________________________________________________________________________     Procedure:             Colonoscopy  Indications:           Therapeutic procedure for known colon polyp  Providers:             LAUREN MCFARLAND MD  Referring MD:            Requesting Provider:   ERIC LOUIS MD, GUILLE SANCHEZ MD, TOMASA NELSON MD  Medicines:             Monitored Anesthesia Care  Complications:         No immediate complications. Estimated blood loss:                         Minimal.  _______________________________________________________________________________  Procedure:             Pre-Anesthesia Assessment:                         - Prior to the procedure, a History and Physical was                         performed, and patient medications and allergies were                         reviewed. The patient is competent. The risks and                         benefits of the procedure and the sedation options and                         risks were discussed with the patient. All questions                         were answered and informed consent was obtained.                         Patient identification and proposed procedure were                         verified by the physician, the nurse, the                          anesthesiologist and the anesthetist in the procedure                         room. Mental Status Examination: alert and oriented.                         Airway Examination: normal oropharyngeal airway and                         neck mobility. Respiratory Examination: clear to                         auscultation. CV Examination: normal. Prophylactic                         Antibiotics: The patient does not require prophylactic                         antibiotics. Prior Anticoagulants: The patient has                         taken no anticoagulant or antiplatelet agents. ASA                         Grade Assessment: II - A patient with mild systemic                         disease. After reviewing the risks and benefits, the                         patient was deemed in satisfactory condition to                         undergo the procedure. The anesthesia plan was to use                         monitored anesthesia care (MAC). Immediately prior to                         administration of medications, the patient was                         re-assessed for adequacy to receive sedatives. The                         heart rate, respiratory rate, oxygen saturations,                         blood pressure, adequacy of pulmonary ventilation, and                         response to care were monitored throughout the                         procedure. The physical status of the patient was                         re-assessed after the procedure.                         After obtaining informed consent, the colonoscope was                         passed under direct vision. Throughout the procedure,                         the patient's blood pressure, pulse, and oxygen                         saturations were monitored continuously. The                         Colonoscope was introduced through the anus and                         advanced to the cecum, identified by appendiceal                          orifice and ileocecal valve. The colonoscopy was                         performed without difficulty. The patient tolerated                         the procedure well. The quality of the bowel                         preparation was evaluated using the BBPS (Mobile Bowel                         Preparation Scale) with scores of: Right Colon = 3,                         Transverse Colon = 3 and Left Colon = 3 (entire mucosa                         seen well with no residual staining, small fragments                         of stool or opaque liquid). The total BBPS score                         equals 9.                                                                                   Findings:       The perianal and digital rectal examinations were normal. Pertinent       negatives include no palpable rectal lesions.       A 30 mm polyp was found in the proximal ascending colon. The polyp was       Citlali classification IIa (superficial, elevated), NICE type 1. Scarred       down portion at the proximal/lateral aspect of polyp from prior       polypectomy. Preparations were made for mucosal resection. Ascendo was       injected to raise the lesion. To ensure adequate capture/resection of       scarred down portion, a margin was cut along the proximal/lateral aspect       with the tip of the snare. Piecemeal mucosal resection using a snare was       performed. Resection and retrieval were complete. Resected tissue       margins were examined and clear of polyp tissue. Margins ablated with       the snare tip with soft coagulation. To prevent bleeding after mucosal       resection, five hemostatic clips were successfully placed (MR       conditional). There was no bleeding at the end of the procedure.       Verification of patient identification for the specimen was done by the       physician and nurse using the patient's name, birth date and medical       record number. Estimated blood loss was minimal.        Multiple small and large-mouthed diverticula were found in the sigmoid       colon, descending colon and transverse colon.       The retroflexed view of the distal rectum and anal verge was normal and       showed no anal or rectal abnormalities.                                                                                   Impression:            - One 30 mm polyp (Citlali IIa, NICE type 1) in the                         proximal ascending colon with a scarred down/tethered                         portion, removed ESD type mucosal incision along one                         aspect followed by piecemeal mucosal resection.                         Resected and retrieved. Snare tip soft coagulation of                         the normal margins. Clips (MR conditional) were placed.                         - Diverticulosis in the sigmoid colon, in the                         descending colon and in the transverse colon.                         - The distal rectum and anal verge are normal on                         retroflexion view.                         - MODIFER 22 given complexity of procedure utilizing                         advanced techniques  Recommendation:        - Discharge patient to home (ambulatory).                         - Await pathology results.                         - Repeat colonoscopy in 6 months for surveillance                         based on pathology results.                         - Resume home medications and regular diet today                                                                                     Gregorio Kunz MD

## 2023-12-01 NOTE — ANESTHESIA CARE TRANSFER NOTE
Patient: Radha Morrison    Procedure: Procedure(s):  COLONOSCOPY, WITH ENDOSCOPIC MUCOSAL RESECTION       Diagnosis: Polyp of ascending colon [K63.5]  Diagnosis Additional Information: No value filed.    Anesthesia Type:   MAC     Note:    Oropharynx: oropharynx clear of all foreign objects and spontaneously breathing  Level of Consciousness: awake  Oxygen Supplementation: room air    Independent Airway: airway patency satisfactory and stable  Dentition: dentition unchanged  Vital Signs Stable: post-procedure vital signs reviewed and stable  Report to RN Given: handoff report given  Patient transferred to: Phase II    Handoff Report: Identifed the Patient, Identified the Reponsible Provider, Reviewed the pertinent medical history, Discussed the surgical course, Reviewed Intra-OP anesthesia mangement and issues during anesthesia, Set expectations for post-procedure period and Allowed opportunity for questions and acknowledgement of understanding      Vitals:  Vitals Value Taken Time   BP 96/71 12/01/23 1359   Temp     Pulse 83 12/01/23 1359   Resp     SpO2 97 % 12/01/23 1402   Vitals shown include unfiled device data.    Electronically Signed By: JOSSUE Chung CRNA  December 1, 2023  2:03 PM

## 2023-12-01 NOTE — ANESTHESIA POSTPROCEDURE EVALUATION
Patient: Radha Morrison    Procedure: Procedure(s):  COLONOSCOPY, WITH ENDOSCOPIC MUCOSAL RESECTION       Anesthesia Type:  MAC    Note:  Disposition: Outpatient   Postop Pain Control: Uneventful            Sign Out: Well controlled pain   PONV: No   Neuro/Psych: Uneventful            Sign Out: Acceptable/Baseline neuro status   Airway/Respiratory: Uneventful            Sign Out: Acceptable/Baseline resp. status   CV/Hemodynamics: Uneventful            Sign Out: Acceptable CV status; No obvious hypovolemia; No obvious fluid overload   Other NRE: NONE   DID A NON-ROUTINE EVENT OCCUR? No           Last vitals:  Vitals Value Taken Time   BP 96/71 12/01/23 1400   Temp 36.4  C (97.6  F) 12/01/23 1400   Pulse 83 12/01/23 1400   Resp 16 12/01/23 1400   SpO2 97 % 12/01/23 1422   Vitals shown include unfiled device data.    Electronically Signed By: Kellee Hamilton MD  December 1, 2023  2:24 PM

## 2023-12-01 NOTE — DISCHARGE INSTRUCTIONS
[facility]  Same-Day Surgery   Adult Discharge Orders & Instructions     For 24 hours after surgery    Get plenty of rest.  A responsible adult must stay with you for at least 24 hours after you leave the hospital.   Do not drive or use heavy equipment.  If you have weakness or tingling, don't drive or use heavy equipment until this feeling goes away.  Do not drink alcohol.  Avoid strenuous or risky activities.  Ask for help when climbing stairs.   You may feel lightheaded.  IF so, sit for a few minutes before standing.  Have someone help you get up.   If you have nausea (feel sick to your stomach): Drink only clear liquids such as apple juice, ginger ale, broth or 7-Up.  Rest may also help.  Be sure to drink enough fluids.  Move to a regular diet as you feel able.  You may have a slight fever. Call the doctor if your fever is over 100 F (37.7 C) (taken under the tongue) or lasts longer than 24 hours.  You may have a dry mouth, a sore throat, muscle aches or trouble sleeping.  These should go away after 24 hours.  Do not make important or legal decisions.   Call your doctor for any of the followin.  Signs of infection (fever, growing tenderness at the surgery site, a large amount of drainage or bleeding, severe pain, foul-smelling drainage, redness, swelling).    2. It has been over 8 to 10 hours since surgery and you are still not able to urinate (pass water).    3.  Headache for over 24 hours.    4.  Numbness, tingling or weakness the day after surgery (if you had spinal anesthesia).  To contact a doctor, call Dr. Kunz at [phone] at the Gastroenterology Clinic from 8 am till 5 pm or:    '   [phone] and ask for the resident on call for   GI or Gastroenterology (answered 24 hours a day)  '   Emergency Department:    [facility]: [phone]       (TTY for hearing impaired: [phone])    [facility]: [phone]       (TTY for hearing impaired:  [phone])

## 2023-12-07 NOTE — RESULT ENCOUNTER NOTE
Pathology from colonoscopy reviewed. See report for details. SSA removed in piecemeal. Recommend repeat colonoscopy in 6 months for surveillance.    Alfreda,  Please assist in scheduling:     Procedure/Imaging/Clinic: Colonoscopy  Physician: Ze  Timin months  Scope time needed: 30 min  Anesthesia: MAC  Dx: h/o ascending colon polyp  Tier: 4  Location: [facility]  Header of letter for pt communication: Colonoscopy    Thanks    Gregorio Kunz MD  [facility]  Division of Gastroenterology and Hepatology  [facility] [address] [place], [place] [address]

## 2023-12-15 ENCOUNTER — TRANSFERRED RECORDS (OUTPATIENT)
Dept: HEALTH INFORMATION MANAGEMENT | Facility: CLINIC | Age: 63
End: 2023-12-15
Payer: COMMERCIAL

## 2024-01-04 ENCOUNTER — PATIENT OUTREACH (OUTPATIENT)
Dept: GASTROENTEROLOGY | Facility: CLINIC | Age: 64
End: 2024-01-04
Payer: COMMERCIAL

## 2024-03-05 ENCOUNTER — PATIENT OUTREACH (OUTPATIENT)
Dept: GASTROENTEROLOGY | Facility: CLINIC | Age: 64
End: 2024-03-05
Payer: COMMERCIAL

## 2024-03-05 ENCOUNTER — PREP FOR PROCEDURE (OUTPATIENT)
Dept: GASTROENTEROLOGY | Facility: CLINIC | Age: 64
End: 2024-03-05
Payer: COMMERCIAL

## 2024-03-05 ENCOUNTER — TRANSFERRED RECORDS (OUTPATIENT)
Dept: HEALTH INFORMATION MANAGEMENT | Facility: CLINIC | Age: 64
End: 2024-03-05
Payer: COMMERCIAL

## 2024-03-05 DIAGNOSIS — Z86.0100 HISTORY OF COLON POLYPS: Primary | ICD-10-CM

## 2024-03-05 DIAGNOSIS — R11.0 NAUSEA: Primary | ICD-10-CM

## 2024-03-05 DIAGNOSIS — Z86.0100 HISTORY OF COLONIC POLYPS: ICD-10-CM

## 2024-03-05 RX ORDER — BISACODYL 5 MG/1
TABLET, DELAYED RELEASE ORAL
Qty: 4 TABLET | Refills: 0 | Status: SHIPPED | OUTPATIENT
Start: 2024-03-05

## 2024-03-05 RX ORDER — ONDANSETRON 4 MG/1
4 TABLET, ORALLY DISINTEGRATING ORAL EVERY 6 HOURS PRN
Qty: 3 TABLET | Refills: 0 | Status: SHIPPED | OUTPATIENT
Start: 2024-03-05

## 2024-03-05 NOTE — TELEPHONE ENCOUNTER
Called pt to discuss follow up due in , left VM. Pt had called the GI main office to schedule and msg was routed to me.     Procedure/Imaging/Clinic: Colonoscopy  Physician: Ze  Timin months (from 23 procedure date)  Scope time needed: 30 min  Anesthesia: MAC  Dx: h/o ascending colon polyp  Tier: 4  Location: [facility]  Header of letter for pt communication: Colonoscopy    1140  Pt in agreement with procedure date of  at [facility].    Explained they will need a , someone to stay with them for 24 hours and should stay in town for 24 hours (within 45 min of Hospital) post procedure     Patient needs to get pre-op physical completed. If outside [facility] system will need physical faxed to number [phone]   If you do not get a preop physical, your procedure could be cancelled, patient voiced understanding*     Preop Plan: Dr Marin, PCP in [facility], pt will make appointment     Does patient have [employer] insurance?: [employer]     Med Review     Blood thinner -  none  ASA - none  Diabetic - none  Any meds by injection or mouth for weight loss or diabetes- none     Patient Education r/t procedure: mychart     A pre-op nurse will call 1-2 days prior to the procedure.     Prep : Golytely bowel prep, prescription sent to pharmacy of patient's choosing, zofran prior was helpful.      Verbalized understanding of all instructions. All questions answered.      Procedure order placed, message routed to OR      Roma Mancilla, RN, BSN,   [facility]  Care coordinator

## 2024-03-18 SDOH — HEALTH STABILITY: PHYSICAL HEALTH: ON AVERAGE, HOW MANY MINUTES DO YOU ENGAGE IN EXERCISE AT THIS LEVEL?: 40 MIN

## 2024-03-18 SDOH — HEALTH STABILITY: PHYSICAL HEALTH: ON AVERAGE, HOW MANY DAYS PER WEEK DO YOU ENGAGE IN MODERATE TO STRENUOUS EXERCISE (LIKE A BRISK WALK)?: 6 DAYS

## 2024-03-18 ASSESSMENT — SOCIAL DETERMINANTS OF HEALTH (SDOH): HOW OFTEN DO YOU GET TOGETHER WITH FRIENDS OR RELATIVES?: ONCE A WEEK

## 2024-03-25 ENCOUNTER — OFFICE VISIT (OUTPATIENT)
Dept: INTERNAL MEDICINE | Facility: CLINIC | Age: 64
End: 2024-03-25
Payer: COMMERCIAL

## 2024-03-25 VITALS
RESPIRATION RATE: 16 BRPM | SYSTOLIC BLOOD PRESSURE: 114 MMHG | HEART RATE: 75 BPM | WEIGHT: 122.9 LBS | DIASTOLIC BLOOD PRESSURE: 62 MMHG | BODY MASS INDEX: 19.75 KG/M2 | OXYGEN SATURATION: 97 % | HEIGHT: 66 IN | TEMPERATURE: 97.6 F

## 2024-03-25 DIAGNOSIS — Z78.9 STATIN INTOLERANCE: ICD-10-CM

## 2024-03-25 DIAGNOSIS — M15.0 PRIMARY OSTEOARTHRITIS INVOLVING MULTIPLE JOINTS: ICD-10-CM

## 2024-03-25 DIAGNOSIS — J84.89 NSIP (NONSPECIFIC INTERSTITIAL PNEUMONIA) (H): ICD-10-CM

## 2024-03-25 DIAGNOSIS — Z23 ENCOUNTER FOR VACCINATION: ICD-10-CM

## 2024-03-25 DIAGNOSIS — K21.9 GASTROESOPHAGEAL REFLUX DISEASE WITHOUT ESOPHAGITIS: ICD-10-CM

## 2024-03-25 DIAGNOSIS — F51.01 PRIMARY INSOMNIA: ICD-10-CM

## 2024-03-25 DIAGNOSIS — N95.2 ATROPHIC VAGINITIS: ICD-10-CM

## 2024-03-25 DIAGNOSIS — Z00.00 ROUTINE GENERAL MEDICAL EXAMINATION AT A HEALTH CARE FACILITY: Primary | ICD-10-CM

## 2024-03-25 DIAGNOSIS — M35.02 SJOGREN'S SYNDROME WITH LUNG INVOLVEMENT (H): ICD-10-CM

## 2024-03-25 DIAGNOSIS — I25.10 ATHEROSCLEROSIS OF NATIVE CORONARY ARTERY OF NATIVE HEART WITHOUT ANGINA PECTORIS: ICD-10-CM

## 2024-03-25 DIAGNOSIS — D69.1 PLATELET DISORDER (H): ICD-10-CM

## 2024-03-25 DIAGNOSIS — R93.1 AGATSTON CORONARY ARTERY CALCIUM SCORE LESS THAN 100: ICD-10-CM

## 2024-03-25 DIAGNOSIS — M81.8 OTHER OSTEOPOROSIS WITHOUT CURRENT PATHOLOGICAL FRACTURE: ICD-10-CM

## 2024-03-25 DIAGNOSIS — M54.2 CERVICALGIA: ICD-10-CM

## 2024-03-25 DIAGNOSIS — J30.2 SEASONAL ALLERGIC RHINITIS, UNSPECIFIED TRIGGER: ICD-10-CM

## 2024-03-25 PROBLEM — J84.9 ILD (INTERSTITIAL LUNG DISEASE) (H): Status: RESOLVED | Noted: 2019-02-09 | Resolved: 2024-03-25

## 2024-03-25 LAB
ALBUMIN SERPL BCG-MCNC: 4.8 G/DL (ref 3.5–5.2)
ALP SERPL-CCNC: 48 U/L (ref 40–150)
ALT SERPL W P-5'-P-CCNC: 13 U/L (ref 0–50)
ANION GAP SERPL CALCULATED.3IONS-SCNC: 11 MMOL/L (ref 7–15)
AST SERPL W P-5'-P-CCNC: 26 U/L (ref 0–45)
BILIRUB SERPL-MCNC: 0.4 MG/DL
BUN SERPL-MCNC: 18.9 MG/DL (ref 8–23)
CALCIUM SERPL-MCNC: 10 MG/DL (ref 8.8–10.2)
CHLORIDE SERPL-SCNC: 100 MMOL/L (ref 98–107)
CREAT SERPL-MCNC: 0.98 MG/DL (ref 0.51–0.95)
DEPRECATED HCO3 PLAS-SCNC: 28 MMOL/L (ref 22–29)
EGFRCR SERPLBLD CKD-EPI 2021: 65 ML/MIN/1.73M2
ERYTHROCYTE [DISTWIDTH] IN BLOOD BY AUTOMATED COUNT: 12.7 % (ref 10–15)
GLUCOSE SERPL-MCNC: 102 MG/DL (ref 70–99)
HBA1C MFR BLD: 5.5 % (ref 0–5.6)
HCT VFR BLD AUTO: 44 % (ref 35–47)
HGB BLD-MCNC: 14.5 G/DL (ref 11.7–15.7)
MCH RBC QN AUTO: 30.9 PG (ref 26.5–33)
MCHC RBC AUTO-ENTMCNC: 33 G/DL (ref 31.5–36.5)
MCV RBC AUTO: 94 FL (ref 78–100)
PLATELET # BLD AUTO: 144 10E3/UL (ref 150–450)
POTASSIUM SERPL-SCNC: 4.4 MMOL/L (ref 3.4–5.3)
PROT SERPL-MCNC: 7.3 G/DL (ref 6.4–8.3)
RBC # BLD AUTO: 4.7 10E6/UL (ref 3.8–5.2)
SODIUM SERPL-SCNC: 139 MMOL/L (ref 135–145)
TSH SERPL DL<=0.005 MIU/L-ACNC: 1.39 UIU/ML (ref 0.3–4.2)
VIT D+METAB SERPL-MCNC: 44 NG/ML (ref 20–50)
WBC # BLD AUTO: 5.9 10E3/UL (ref 4–11)

## 2024-03-25 PROCEDURE — 85027 COMPLETE CBC AUTOMATED: CPT | Performed by: INTERNAL MEDICINE

## 2024-03-25 PROCEDURE — 90480 ADMN SARSCOV2 VAC 1/ONLY CMP: CPT | Performed by: INTERNAL MEDICINE

## 2024-03-25 PROCEDURE — 91320 SARSCV2 VAC 30MCG TRS-SUC IM: CPT | Performed by: INTERNAL MEDICINE

## 2024-03-25 PROCEDURE — 99396 PREV VISIT EST AGE 40-64: CPT | Mod: 25 | Performed by: INTERNAL MEDICINE

## 2024-03-25 PROCEDURE — 82306 VITAMIN D 25 HYDROXY: CPT | Performed by: INTERNAL MEDICINE

## 2024-03-25 PROCEDURE — 84443 ASSAY THYROID STIM HORMONE: CPT | Performed by: INTERNAL MEDICINE

## 2024-03-25 PROCEDURE — 99214 OFFICE O/P EST MOD 30 MIN: CPT | Mod: 25 | Performed by: INTERNAL MEDICINE

## 2024-03-25 PROCEDURE — 36415 COLL VENOUS BLD VENIPUNCTURE: CPT | Performed by: INTERNAL MEDICINE

## 2024-03-25 PROCEDURE — 83036 HEMOGLOBIN GLYCOSYLATED A1C: CPT | Performed by: INTERNAL MEDICINE

## 2024-03-25 PROCEDURE — 80053 COMPREHEN METABOLIC PANEL: CPT | Performed by: INTERNAL MEDICINE

## 2024-03-25 RX ORDER — ACETAMINOPHEN 325 MG/1
650 TABLET ORAL EVERY 6 HOURS PRN
Status: CANCELLED | OUTPATIENT
Start: 2024-04-01

## 2024-03-25 RX ORDER — ALBUTEROL SULFATE 0.83 MG/ML
2.5 SOLUTION RESPIRATORY (INHALATION)
Status: CANCELLED | OUTPATIENT
Start: 2024-04-01

## 2024-03-25 RX ORDER — ALBUTEROL SULFATE 90 UG/1
1-2 AEROSOL, METERED RESPIRATORY (INHALATION)
Status: CANCELLED
Start: 2024-04-01

## 2024-03-25 RX ORDER — MEPERIDINE HYDROCHLORIDE 25 MG/ML
25 INJECTION INTRAMUSCULAR; INTRAVENOUS; SUBCUTANEOUS EVERY 30 MIN PRN
Status: CANCELLED | OUTPATIENT
Start: 2024-04-01

## 2024-03-25 RX ORDER — EPINEPHRINE 1 MG/ML
0.3 INJECTION, SOLUTION, CONCENTRATE INTRAVENOUS EVERY 5 MIN PRN
Status: CANCELLED | OUTPATIENT
Start: 2024-04-01

## 2024-03-25 RX ORDER — ESTRADIOL 0.1 MG/G
CREAM VAGINAL
Qty: 42.5 G | Refills: 3 | Status: SHIPPED | OUTPATIENT
Start: 2024-03-25

## 2024-03-25 RX ORDER — HEPARIN SODIUM (PORCINE) LOCK FLUSH IV SOLN 100 UNIT/ML 100 UNIT/ML
5 SOLUTION INTRAVENOUS
Status: CANCELLED | OUTPATIENT
Start: 2024-04-01

## 2024-03-25 RX ORDER — DIPHENHYDRAMINE HYDROCHLORIDE 50 MG/ML
50 INJECTION INTRAMUSCULAR; INTRAVENOUS
Status: CANCELLED
Start: 2024-04-01

## 2024-03-25 RX ORDER — METHYLPREDNISOLONE SODIUM SUCCINATE 125 MG/2ML
125 INJECTION, POWDER, LYOPHILIZED, FOR SOLUTION INTRAMUSCULAR; INTRAVENOUS
Status: CANCELLED
Start: 2024-04-01

## 2024-03-25 RX ORDER — METHOCARBAMOL 750 MG/1
750 TABLET, FILM COATED ORAL 4 TIMES DAILY PRN
Qty: 90 TABLET | Refills: 3 | Status: SHIPPED | OUTPATIENT
Start: 2024-03-25

## 2024-03-25 RX ORDER — HEPARIN SODIUM,PORCINE 10 UNIT/ML
5-20 VIAL (ML) INTRAVENOUS DAILY PRN
Status: CANCELLED | OUTPATIENT
Start: 2024-04-01

## 2024-03-25 RX ORDER — ZOLEDRONIC ACID 5 MG/100ML
5 INJECTION, SOLUTION INTRAVENOUS ONCE
Status: CANCELLED
Start: 2024-04-01

## 2024-03-25 ASSESSMENT — ASTHMA QUESTIONNAIRES
QUESTION_4 LAST FOUR WEEKS HOW OFTEN HAVE YOU USED YOUR RESCUE INHALER OR NEBULIZER MEDICATION (SUCH AS ALBUTEROL): NOT AT ALL
ACT_TOTALSCORE: 25
QUESTION_5 LAST FOUR WEEKS HOW WOULD YOU RATE YOUR ASTHMA CONTROL: COMPLETELY CONTROLLED
ACT_TOTALSCORE: 25
QUESTION_1 LAST FOUR WEEKS HOW MUCH OF THE TIME DID YOUR ASTHMA KEEP YOU FROM GETTING AS MUCH DONE AT WORK, SCHOOL OR AT HOME: NONE OF THE TIME
QUESTION_3 LAST FOUR WEEKS HOW OFTEN DID YOUR ASTHMA SYMPTOMS (WHEEZING, COUGHING, SHORTNESS OF BREATH, CHEST TIGHTNESS OR PAIN) WAKE YOU UP AT NIGHT OR EARLIER THAN USUAL IN THE MORNING: NOT AT ALL
QUESTION_2 LAST FOUR WEEKS HOW OFTEN HAVE YOU HAD SHORTNESS OF BREATH: NOT AT ALL

## 2024-03-25 NOTE — PATIENT INSTRUCTIONS
Preventive Care Advice   This is general advice given by our system to help you stay healthy. However, your care team may have specific advice just for you. Please talk to your care team about your preventive care needs.  Nutrition  Eat 5 or more servings of fruits and vegetables each day.  Try wheat bread, brown rice and whole grain pasta (instead of white bread, rice, and pasta).  Get enough calcium and vitamin D. Check the label on foods and aim for 100% of the RDA (recommended daily allowance).  Lifestyle  Exercise at least 150 minutes each week   (30 minutes a day, 5 days a week).  Do muscle strengthening activities 2 days a week. These help control your weight and prevent disease.  No smoking.  Wear sunscreen to prevent skin cancer.  Have a dental exam and cleaning every 6 months.  Yearly exams  See your health care team every year to talk about:  Any changes in your health.  Any medicines your care team has prescribed.  Preventive care, family planning, and ways to prevent chronic diseases.  Shots (vaccines)   HPV shots (up to age 26), if you've never had them before.  Hepatitis B shots (up to age 59), if you've never had them before.  COVID-19 shot: Get this shot when it's due.  Flu shot: Get a flu shot every year.  Tetanus shot: Get a tetanus shot every 10 years.  Pneumococcal, hepatitis A, and RSV shots: Ask your care team if you need these based on your risk.  Shingles shot (for age 50 and up).  General health tests  Diabetes screening:  Starting at age 35, Get screened for diabetes at least every 3 years.  If you are younger than age 35, ask your care team if you should be screened for diabetes.  Cholesterol test: At age 39, start having a cholesterol test every 5 years, or more often if advised.  Bone density scan (DEXA): At age 50, ask your care team if you should have this scan for osteoporosis (brittle bones).  Hepatitis C: Get tested at least once in your life.  STIs (sexually transmitted  infections)  Before age 24: Ask your care team if you should be screened for STIs.  After age 24: Get screened for STIs if you're at risk. You are at risk for STIs (including HIV) if:  You are sexually active with more than one person.  You don't use condoms every time.  You or a partner was diagnosed with a sexually transmitted infection.  If you are at risk for HIV, ask about PrEP medicine to prevent HIV.  Get tested for HIV at least once in your life, whether you are at risk for HIV or not.  Cancer screening tests  Cervical cancer screening: If you have a cervix, begin getting regular cervical cancer screening tests at age 21. Most people who have regular screenings with normal results can stop after age 65. Talk about this with your provider.  Breast cancer scan (mammogram): If you've ever had breasts, begin having regular mammograms starting at age 40. This is a scan to check for breast cancer.  Colon cancer screening: It is important to start screening for colon cancer at age 45.  Have a colonoscopy test every 10 years (or more often if you're at risk) Or, ask your provider about stool tests like a FIT test every year or Cologuard test every 3 years.  To learn more about your testing options, visit: https://www.[employer]/687319.pdf.  For help making a decision, visit: https://bit.ly/ni61656.  Prostate cancer screening test: If you have a prostate and are age 55 to 69, ask your provider if you would benefit from a yearly prostate cancer screening test.  Lung cancer screening: If you are a current or former smoker age 50 to 80, ask your care team if ongoing lung cancer screenings are right for you.  For informational purposes only. Not to replace the advice of your health care provider. Copyright   2023 [employer]. All rights reserved. Clinically reviewed by the [facility] Transitions Program. [employer] [id number] - REV 01/24.

## 2024-03-25 NOTE — ASSESSMENT & PLAN NOTE
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  Previously controlled with as needed Flexeril, but this is decreasing in efficacy for her.  -Will trial another muscle relaxant, methocarbamol 750 mg QID PRN

## 2024-03-25 NOTE — ASSESSMENT & PLAN NOTE
CAC score of 71 (9/19/22), unable to tolerate statin so started on Repatha. Zetia started 1/2023 as LDL was in the low 100s. TSH 2.03. Last LDL 10/2023 was 59.   - Continue repatha and zetia at current doses

## 2024-03-25 NOTE — PROGRESS NOTES
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  Previously controlled with as needed Flexeril, but this is decreasing in efficacy for her.  -Will trial another muscle relaxant, methocarbamol 750 mg QID PRN         Relevant Medications    methocarbamol (ROBAXIN) 750 MG tablet       Respiratory    NSIP (nonspecific interstitial pneumonia) (H) (Chronic)     Follows with Dr. Berry (pulm) and Dr. Arce (rheum), recent notes reviewed. HR CT chest stable 9/11/23.   - Stable on imuran and symbicort         Vasomotor Rhinitis     Allergic in nature. Well controlled with daily zyrtec.             Digestive    Gastroesophageal reflux disease without esophagitis     Well-controlled with as needed Pepcid 20 mg.            Circulatory    Agatston coronary artery calcium score less than 100     CAC score of 71 (9/19/22), unable to tolerate statin so started on Repatha. Zetia started 1/2023 as LDL was in the low 100s. TSH 2.03. Last LDL 10/2023 was 59.   - Continue repatha and zetia at current doses            Musculoskeletal and Integumentary    Other osteoporosis without current pathological fracture (Chronic)     Patient's bone density has continued did respond very well to Prolia, last DEXA 3/2023 showed 2% increase in BMD in both her hips and spine.  Because of her low fracture risk, insurance is requiring us to switch to oral or IV bisphosphonate.  She did trial oral bisphosphonate in the past had issues with her reflux.  We will trial switching to IV Reclast to \"seal the gains\" with Prolia with goal of drug holiday as well.   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   - Will be getting L thumb joint replacement in April  - Updating labs today  - Chronic conditions are stable, no issues with her proceeding with surgery, would recommend stopping ASA 7-10 days prior         Relevant Medications    methocarbamol (ROBAXIN) 750 MG tablet       Immune    Sjogren's syndrome (H24)     Follows with Dr. Arce from rheumatology, June visit note reviewed.  - Continue q6 month f/u with rheum  - Continue imuran 50 mg daily            Urinary    Atrophic vaginitis     Well-controlled with Estrace cream.         Relevant Medications    estradiol (ESTRACE) 0.1 MG/GM vaginal cream       Hematologic    Platelet disorder (H) (Chronic)     EDTA resistant, clumps easily.   - Platelets slightly low today, likely as a result of this, not a concern for upcoming procedure as true platelet count and clotting ability are normal and intact             Other    Insomnia     Well-controlled with nightly trazodone 200 mg and Seroquel 100 mg.         Routine general medical examination at a health care facility - Primary     We discussed healthy lifestyle, nutrition, cardiovascular risk reduction, self care, safety, sunscreen, and timing of cancer screening.  Health maintenance screening and immunizations reviewed with the patient.    - Mammo BIRADS 1 2/2024  - Pap due 2027  - Colonoscopy done 12/2023 with SSA removed piecemeal, so next scheduled for 6/2024  - Update labs today   - Will give another COVID booster given immunocompromised status         Relevant Orders    Comprehensive metabolic panel    CBC with platelets (Completed)    TSH with free T4  reflex     Other Visit Diagnoses       Statin intolerance        Relevant Medications    evolocumab (REPATHA) 140 MG/ML prefilled autoinjector    Atherosclerosis of native coronary artery of native heart without angina pectoris        Relevant Medications    evolocumab (REPATHA) 140 MG/ML prefilled autoinjector    Encounter for vaccination        Relevant Orders    COVID-19 12+ (2023-24) (PFIZER) (Completed)             Patient has been advised of split billing requirements and indicates understanding: Yes     Counseling  Appropriate preventive services were discussed with this patient, including applicable screening as appropriate for fall prevention, nutrition, physical activity, Tobacco-use cessation, weight loss and cognition.  Checklist reviewing preventive services available has been given to the patient.  Reviewed patient's diet, addressing concerns and/or questions.     FUTURE APPOINTMENTS:       - Follow-up visit in May for pre-op as scheduled       Felipa Garcia is a 63 year old, presenting for the following:  Physical        3/25/2024     8:14 AM   Additional Questions   Roomed by Danielle Hutson   Accompanied by N/A        Health Care Directive  Patient does not have a Health Care Directive or Living Will: Patient states has Advance Directive and will bring in a copy to clinic.    HPI    Radha is here for physical today. Overall is feeling well. Plan for first CMC joint replacement in April, will have to be off work for 3 months.     Osteoporosis: Prolia since 2016 with excellent response in bone density. Per insurance, will need to switch to oral or IV bisphosphonate if she is able to tolerate.     CAD: CAC score of 71 (9/19/22), unable to tolerate high dose statin so started on Repatha. Zetia added in 2023. LDL 59 10/3/23. Also taking ASA 81 mg daily.      ILD/NSIP: Had repeat HR CT 9/11/23, NSIP/fibrosis/volume loss stable. Last saw pulm (Dr. Berry) at [facility] 10/4/23. Stable with imuran 50 mg daily.  Next visit scheduled 4/23/24.     COPD/restrictive lung disease: As above, saw Dr. Berry 10/4/23. Cont symbicort, trial aerobika flutter valve for intermittent wheezing. Breathing is great today.     OA: thumb injections 12/18/23. Only R 3/6/24 b/c getting L thumb surgery in April as noted above.     Sjogren's syndrome: stable with rheum 11/30/23, cont low dose azathioprine. CBC, CRP and LFTs normal    Insomnia: Seroquel 100 mg daily, trazodone 200 mg at bedtime.      Atrophic vaginitis: estrace cream, works well      GERD: pepcid 20 mg, works well      Muscle tightness: Flexeril 10 mg BID PRN, maybe uses a few times a month. Always upper back, likely related to playing. Has noticed in last few months, not as effective. Wondering if there is another medication alternative to try.     Allergies: zyrtec 10 mg daily      HCM: Colonoscopy 12/4/23, SSA removed piecemeal, needs repeat in 6 months for surveillance. Next is scheduled for 6/6/24. Mammo BIRADS1 2/2024. Last pap normal 2022, does still follow with GYN.         3/18/2024   General Health   How would you rate your overall physical health? Excellent   Feel stress (tense, anxious, or unable to sleep) Not at all         3/18/2024   Nutrition   Three or more servings of calcium each day? Yes   Diet: Regular (no restrictions)   How many servings of fruit and vegetables per day? 4 or more   How many sweetened beverages each day? (!) 2         3/18/2024   Exercise   Days per week of moderate/strenous exercise 6 days   Average minutes spent exercising at this level 40 min         3/18/2024   Social Factors   Frequency of gathering with friends or relatives Once a week   Worry food won't last until get money to buy more No   Food not last or not have enough money for food? No   Do you have housing?  Yes   Are you worried about losing your housing? No   Lack of transportation? No   Unable to get utilities (heat,electricity)? No         3/18/2024   Fall Risk   Fallen 2 or  more times in the past year? No   Trouble with walking or balance? No          3/18/2024   Dental   Dentist two times every year? Yes         3/18/2024   TB Screening   Were you born outside of the [place]? No     Today's PHQ-2 Score:       3/25/2024     8:07 AM   PHQ-2 ( 1999 Pfizer)   Q1: Little interest or pleasure in doing things 0   Q2: Feeling down, depressed or hopeless 0   PHQ-2 Score 0   Q1: Little interest or pleasure in doing things Not at all   Q2: Feeling down, depressed or hopeless Not at all   PHQ-2 Score 0         3/18/2024   Substance Use   Alcohol more than 3/day or more than 7/wk No   Do you use any other substances recreationally? No     Social History     Tobacco Use    Smoking status: Never     Passive exposure: Never    Smokeless tobacco: Never   Substance Use Topics    Alcohol use: Yes     Comment: 4 drinks per week    Drug use: No         3/18/2024   Breast Cancer Screening   Family history of breast, colon, or ovarian cancer? No / Unknown      Mammogram Screening - Mammogram every 1-2 years updated in Health Maintenance based on mutual decision making        3/18/2024   STI Screening   New sexual partner(s) since last STI/HIV test? No     History of abnormal Pap smear: NO - age 30-65 PAP every 5 years with negative HPV co-testing recommended        Latest Ref Rng & Units 11/7/2022    12:30 PM 2/26/2021     1:26 PM   PAP / HPV   PAP  Negative for Intraepithelial Lesion or Malignancy (NILM)  Negative for squamous intraepithelial lesion or malignancy  Electronically signed by Lizbet Stafford CT (ASCP) on 3/8/2021 at  3:30 PM      HPV 16 DNA NEG  Negative    HPV 18 DNA NEG  Negative    Other HR HPV NEG  Negative      ASCVD Risk   The 10-year ASCVD risk score (Inocencio HANSEN, et al., 2019) is: 2.4%    Values used to calculate the score:      Age: 63 years      Sex: Female      Is Non- : No      Diabetic: No      Tobacco smoker: No      Systolic Blood Pressure: 114  "mmHg      Is BP treated: No      HDL Cholesterol: 97 mg/dL      Total Cholesterol: 168 mg/dL      Reviewed and updated as needed this visit by Provider   Tobacco  Allergies  Meds  Problems  Med Hx  Surg Hx  Fam Hx            Past Medical History:   Diagnosis Date    Arthritis 6/1/1999    Cervicalgia     Heart disease 7/13/2021    Calcification on an aorta    Idiopathic thrombocytopenic purpura (H)     Interstitial lung disease (H)     Interstitial pulmonary disease (H)     Lumbar radiculopathy     Osteopenia     Raynaud phenomenon     Right shoulder pain     Sjoegren syndrome     Uncomplicated asthma 6/9/2022    Vasomotor rhinitis      Past Surgical History:   Procedure Laterality Date    BIOPSY  7/2012, 11/7/2022    surgical lung biopsy, uterine biopsy    COLONOSCOPY N/A 08/29/2023    Procedure: Colonoscopy WITH POLYPECTOMY;  Surgeon: Eda Clements MD;  Location: [facility] OR    EYE SURGERY  3/3/22    cataract    HC REPAIR OF NASAL SEPTUM      Description: Septoplasty;  Recorded: 07/12/2012;    IR CERVICAL EPIDURAL STEROID INJECTION  03/23/2009    ORTHOPEDIC SURGERY  7/2015, 12/29/21    shoulder, Guyan canal release    WA THORACOSCOPY W/DX BX OF LUNG INFILTRATE UNILATRL      Description: Thoracoscopy Of Lungs And Pleural Space With Biopsy Of Lung Infiltrates;  Recorded: 07/23/2012;         Review of Systems  Constitutional, neuro, ENT, endocrine, pulmonary, cardiac, gastrointestinal, genitourinary, musculoskeletal, integument and psychiatric systems are negative, except as otherwise noted.     Objective    Exam  /62 (BP Location: Right arm, Patient Position: Sitting, Cuff Size: Adult Regular)   Pulse 75   Temp 97.6  F (36.4  C) (Oral)   Resp 16   Ht 1.676 m (5' 6\")   Wt 55.7 kg (122 lb 14.4 oz)   LMP  (LMP Unknown)   SpO2 97%   BMI 19.84 kg/m     Estimated body mass index is 19.84 kg/m  as calculated from the following:    Height as of this encounter: 1.676 m (5' 6\").    Weight as of this " encounter: 55.7 kg (122 lb 14.4 oz).    Physical Exam  GENERAL: alert and no distress  EYES: Eyes grossly normal to inspection, PERRL and conjunctivae and sclerae normal  HENT: ear canals and TM's normal, nose and mouth without ulcers or lesions  NECK: no adenopathy, no asymmetry, masses, or scars  RESP: lungs clear to auscultation - no rales, rhonchi or wheezes  CV: regular rate and rhythm, normal S1 S2, no S3 or S4, no murmur, click or rub, no peripheral edema  ABDOMEN: soft, nontender, no hepatosplenomegaly, no masses and bowel sounds normal  MS: no gross musculoskeletal defects noted, no edema  SKIN: no suspicious lesions or rashes  NEURO: Normal strength and tone, mentation intact and speech normal  PSYCH: mentation appears normal, affect normal/bright    Results for orders placed or performed in visit on 03/25/24 (from the past 24 hour(s))   Comprehensive metabolic panel   Result Value Ref Range    Sodium 139 135 - 145 mmol/L    Potassium 4.4 3.4 - 5.3 mmol/L    Carbon Dioxide (CO2) 28 22 - 29 mmol/L    Anion Gap 11 7 - 15 mmol/L    Urea Nitrogen 18.9 8.0 - 23.0 mg/dL    Creatinine 0.98 (H) 0.51 - 0.95 mg/dL    GFR Estimate 65 >60 mL/min/1.73m2    Calcium 10.0 8.8 - 10.2 mg/dL    Chloride 100 98 - 107 mmol/L    Glucose 102 (H) 70 - 99 mg/dL    Alkaline Phosphatase 48 40 - 150 U/L    AST 26 0 - 45 U/L    ALT 13 0 - 50 U/L    Protein Total 7.3 6.4 - 8.3 g/dL    Albumin 4.8 3.5 - 5.2 g/dL    Bilirubin Total 0.4 <=1.2 mg/dL   CBC with platelets   Result Value Ref Range    WBC Count 5.9 4.0 - 11.0 10e3/uL    RBC Count 4.70 3.80 - 5.20 10e6/uL    Hemoglobin 14.5 11.7 - 15.7 g/dL    Hematocrit 44.0 35.0 - 47.0 %    MCV 94 78 - 100 fL    MCH 30.9 26.5 - 33.0 pg    MCHC 33.0 31.5 - 36.5 g/dL    RDW 12.7 10.0 - 15.0 %    Platelet Count 144 (L) 150 - 450 10e3/uL   TSH with free T4 reflex   Result Value Ref Range    TSH 1.39 0.30 - 4.20 uIU/mL   Vitamin D deficiency screening   Result Value Ref Range    Vitamin D, Total  (25-Hydroxy) 44 20 - 50 ng/mL    Narrative    Season, race, dietary intake, and treatment affect the concentration of 25-hydroxy-Vitamin D. Values may decrease during winter months and increase during summer months.    Vitamin D determination is routinely performed by an immunoassay specific for 25 hydroxyvitamin D3.  If an individual is on vitamin D2(ergocalciferol) supplementation, please specify 25 OH vitamin D2 and D3 level determination by LCMSMS test [id number].             Signed Electronically by: Deisi Marin MD

## 2024-03-25 NOTE — ASSESSMENT & PLAN NOTE
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   - Will be getting L thumb joint replacement in April  - Updating labs today  - Chronic conditions are stable, no issues with her proceeding with surgery, would recommend stopping ASA 7-10 days prior

## 2024-03-25 NOTE — ASSESSMENT & PLAN NOTE
"Patient's bone density has continued did respond very well to Prolia, last DEXA 3/2023 showed 2% increase in BMD in both her hips and spine.  Because of her low fracture risk, insurance is requiring us to switch to oral or IV bisphosphonate.  She did trial oral bisphosphonate in the past had issues with her reflux.  We will trial switching to IV Reclast to \"seal the gains\" with Prolia with goal of drug holiday as well.   - IV reclast ordered, she will scheduled within a few weeks of when Prolia would be due next 4/18/24  - Plan to repeat DEXA about 1 year after Reclast dose  "

## 2024-03-25 NOTE — ASSESSMENT & PLAN NOTE
We discussed healthy lifestyle, nutrition, cardiovascular risk reduction, self care, safety, sunscreen, and timing of cancer screening.  Health maintenance screening and immunizations reviewed with the patient.    - Mammo BIRADS 1 2/2024  - Pap due 2027  - Colonoscopy done 12/2023 with SSA removed piecemeal, so next scheduled for 6/2024  - Update labs today   - Will give another COVID booster given immunocompromised status

## 2024-04-03 ENCOUNTER — PATIENT OUTREACH (OUTPATIENT)
Dept: GASTROENTEROLOGY | Facility: CLINIC | Age: 64
End: 2024-04-03
Payer: COMMERCIAL

## 2024-04-03 NOTE — TELEPHONE ENCOUNTER
Returned patient voicemail/call, responding to a message that she said I left her for coordination of colonoscopy. Procedure scheduled for 6/6, no further questions.    Roma Mancilla, RN, BSN,   [facility]  Care coordinator

## 2024-04-10 ENCOUNTER — TRANSFERRED RECORDS (OUTPATIENT)
Dept: HEALTH INFORMATION MANAGEMENT | Facility: CLINIC | Age: 64
End: 2024-04-10
Payer: COMMERCIAL

## 2024-04-19 ENCOUNTER — INFUSION THERAPY VISIT (OUTPATIENT)
Dept: INFUSION THERAPY | Facility: HOSPITAL | Age: 64
End: 2024-04-19
Attending: INTERNAL MEDICINE
Payer: COMMERCIAL

## 2024-04-19 VITALS
RESPIRATION RATE: 16 BRPM | DIASTOLIC BLOOD PRESSURE: 45 MMHG | HEIGHT: 66 IN | SYSTOLIC BLOOD PRESSURE: 98 MMHG | WEIGHT: 120 LBS | HEART RATE: 72 BPM | OXYGEN SATURATION: 99 % | TEMPERATURE: 97.7 F | BODY MASS INDEX: 19.29 KG/M2

## 2024-04-19 DIAGNOSIS — M81.8 OTHER OSTEOPOROSIS WITHOUT CURRENT PATHOLOGICAL FRACTURE: Primary | ICD-10-CM

## 2024-04-19 PROCEDURE — 258N000003 HC RX IP 258 OP 636: Performed by: INTERNAL MEDICINE

## 2024-04-19 PROCEDURE — 250N000011 HC RX IP 250 OP 636: Mod: JZ | Performed by: INTERNAL MEDICINE

## 2024-04-19 PROCEDURE — 96365 THER/PROPH/DIAG IV INF INIT: CPT

## 2024-04-19 RX ORDER — HEPARIN SODIUM,PORCINE 10 UNIT/ML
5-20 VIAL (ML) INTRAVENOUS DAILY PRN
OUTPATIENT
Start: 2025-04-19

## 2024-04-19 RX ORDER — EPINEPHRINE 1 MG/ML
0.3 INJECTION, SOLUTION INTRAMUSCULAR; SUBCUTANEOUS EVERY 5 MIN PRN
Status: DISCONTINUED | OUTPATIENT
Start: 2024-04-19 | End: 2024-04-19

## 2024-04-19 RX ORDER — METHYLPREDNISOLONE SODIUM SUCCINATE 125 MG/2ML
125 INJECTION, POWDER, LYOPHILIZED, FOR SOLUTION INTRAMUSCULAR; INTRAVENOUS
Status: DISCONTINUED | OUTPATIENT
Start: 2024-04-19 | End: 2024-04-19

## 2024-04-19 RX ORDER — ALBUTEROL SULFATE 0.83 MG/ML
2.5 SOLUTION RESPIRATORY (INHALATION)
Status: DISCONTINUED | OUTPATIENT
Start: 2024-04-19 | End: 2024-04-19

## 2024-04-19 RX ORDER — MEPERIDINE HYDROCHLORIDE 25 MG/ML
25 INJECTION INTRAMUSCULAR; INTRAVENOUS; SUBCUTANEOUS EVERY 30 MIN PRN
Status: DISCONTINUED | OUTPATIENT
Start: 2024-04-19 | End: 2024-04-19

## 2024-04-19 RX ORDER — EPINEPHRINE 1 MG/ML
0.3 INJECTION, SOLUTION INTRAMUSCULAR; SUBCUTANEOUS EVERY 5 MIN PRN
OUTPATIENT
Start: 2025-04-19

## 2024-04-19 RX ORDER — ALBUTEROL SULFATE 90 UG/1
1-2 AEROSOL, METERED RESPIRATORY (INHALATION)
Status: DISCONTINUED | OUTPATIENT
Start: 2024-04-19 | End: 2024-04-19

## 2024-04-19 RX ORDER — ALBUTEROL SULFATE 90 UG/1
1-2 AEROSOL, METERED RESPIRATORY (INHALATION)
Start: 2025-04-19

## 2024-04-19 RX ORDER — HEPARIN SODIUM (PORCINE) LOCK FLUSH IV SOLN 100 UNIT/ML 100 UNIT/ML
5 SOLUTION INTRAVENOUS
OUTPATIENT
Start: 2025-04-19

## 2024-04-19 RX ORDER — ALBUTEROL SULFATE 0.83 MG/ML
2.5 SOLUTION RESPIRATORY (INHALATION)
OUTPATIENT
Start: 2025-04-19

## 2024-04-19 RX ORDER — DIPHENHYDRAMINE HYDROCHLORIDE 50 MG/ML
50 INJECTION INTRAMUSCULAR; INTRAVENOUS
Start: 2025-04-19

## 2024-04-19 RX ORDER — ZOLEDRONIC ACID 5 MG/100ML
5 INJECTION, SOLUTION INTRAVENOUS ONCE
Start: 2025-04-19

## 2024-04-19 RX ORDER — MEPERIDINE HYDROCHLORIDE 25 MG/ML
25 INJECTION INTRAMUSCULAR; INTRAVENOUS; SUBCUTANEOUS EVERY 30 MIN PRN
OUTPATIENT
Start: 2025-04-19

## 2024-04-19 RX ORDER — ZOLEDRONIC ACID 5 MG/100ML
5 INJECTION, SOLUTION INTRAVENOUS ONCE
Status: COMPLETED | OUTPATIENT
Start: 2024-04-19 | End: 2024-04-19

## 2024-04-19 RX ORDER — DIPHENHYDRAMINE HYDROCHLORIDE 50 MG/ML
50 INJECTION INTRAMUSCULAR; INTRAVENOUS
Status: DISCONTINUED | OUTPATIENT
Start: 2024-04-19 | End: 2024-04-19

## 2024-04-19 RX ORDER — ACETAMINOPHEN 325 MG/1
650 TABLET ORAL EVERY 6 HOURS PRN
OUTPATIENT
Start: 2025-04-19

## 2024-04-19 RX ORDER — METHYLPREDNISOLONE SODIUM SUCCINATE 125 MG/2ML
125 INJECTION, POWDER, LYOPHILIZED, FOR SOLUTION INTRAMUSCULAR; INTRAVENOUS
Start: 2025-04-19

## 2024-04-19 RX ADMIN — SODIUM CHLORIDE 250 ML: 9 INJECTION, SOLUTION INTRAVENOUS at 11:48

## 2024-04-19 RX ADMIN — ZOLEDRONIC ACID 5 MG: 5 INJECTION, SOLUTION INTRAVENOUS at 11:49

## 2024-04-19 NOTE — PROGRESS NOTES
Infusion Nursing Note:  Radha Morrison presents today for Reclast infusion.    Patient seen by provider today: No   present during visit today: Not Applicable.    Note: Radha arrived ambulatory in stable condition. Printed medication information sheet given to and reviewed with patient. She states she is taking her calcium and vitamin D as prescribed. Recent labs drawn on 3/25//24 meet parameters for infusion. Creat 0.98 and Calcium 10.0.  Radha verbalized understanding plan of care. Reclast infused over 30 minutes and tolerated well.       Intravenous Access:  Peripheral IV placed.    Treatment Conditions:  Results reviewed, labs MET treatment parameters, ok to proceed with treatment.      Post Infusion Assessment:  Patient tolerated infusion without incident.  Site patent and intact, free from redness, edema or discomfort.  Access discontinued per protocol.       Discharge Plan:   Patient discharged in stable condition accompanied by:   Departure Mode: Ambulatory.      Jazmin Wilkerson RN

## 2024-04-22 ENCOUNTER — TRANSFERRED RECORDS (OUTPATIENT)
Dept: HEALTH INFORMATION MANAGEMENT | Facility: CLINIC | Age: 64
End: 2024-04-22
Payer: COMMERCIAL

## 2024-05-13 ASSESSMENT — ASTHMA QUESTIONNAIRES
QUESTION_4 LAST FOUR WEEKS HOW OFTEN HAVE YOU USED YOUR RESCUE INHALER OR NEBULIZER MEDICATION (SUCH AS ALBUTEROL): NOT AT ALL
ACT_TOTALSCORE: 25
QUESTION_1 LAST FOUR WEEKS HOW MUCH OF THE TIME DID YOUR ASTHMA KEEP YOU FROM GETTING AS MUCH DONE AT WORK, SCHOOL OR AT HOME: NONE OF THE TIME
QUESTION_2 LAST FOUR WEEKS HOW OFTEN HAVE YOU HAD SHORTNESS OF BREATH: NOT AT ALL
ACT_TOTALSCORE: 25
QUESTION_3 LAST FOUR WEEKS HOW OFTEN DID YOUR ASTHMA SYMPTOMS (WHEEZING, COUGHING, SHORTNESS OF BREATH, CHEST TIGHTNESS OR PAIN) WAKE YOU UP AT NIGHT OR EARLIER THAN USUAL IN THE MORNING: NOT AT ALL
QUESTION_5 LAST FOUR WEEKS HOW WOULD YOU RATE YOUR ASTHMA CONTROL: COMPLETELY CONTROLLED

## 2024-05-17 ENCOUNTER — TRANSFERRED RECORDS (OUTPATIENT)
Dept: HEALTH INFORMATION MANAGEMENT | Facility: CLINIC | Age: 64
End: 2024-05-17
Payer: COMMERCIAL

## 2024-05-20 ENCOUNTER — OFFICE VISIT (OUTPATIENT)
Dept: INTERNAL MEDICINE | Facility: CLINIC | Age: 64
End: 2024-05-20
Payer: COMMERCIAL

## 2024-05-20 VITALS
SYSTOLIC BLOOD PRESSURE: 104 MMHG | RESPIRATION RATE: 16 BRPM | DIASTOLIC BLOOD PRESSURE: 62 MMHG | BODY MASS INDEX: 20.03 KG/M2 | HEART RATE: 83 BPM | WEIGHT: 124.6 LBS | TEMPERATURE: 98.8 F | OXYGEN SATURATION: 98 % | HEIGHT: 66 IN

## 2024-05-20 DIAGNOSIS — K21.9 GASTROESOPHAGEAL REFLUX DISEASE WITHOUT ESOPHAGITIS: ICD-10-CM

## 2024-05-20 DIAGNOSIS — D69.1 PLATELET DISORDER (H): Chronic | ICD-10-CM

## 2024-05-20 DIAGNOSIS — M81.8 OTHER OSTEOPOROSIS WITHOUT CURRENT PATHOLOGICAL FRACTURE: Chronic | ICD-10-CM

## 2024-05-20 DIAGNOSIS — R93.1 AGATSTON CORONARY ARTERY CALCIUM SCORE LESS THAN 100: ICD-10-CM

## 2024-05-20 DIAGNOSIS — M35.02 SJOGREN'S SYNDROME WITH LUNG INVOLVEMENT (H): ICD-10-CM

## 2024-05-20 DIAGNOSIS — D12.2 ADENOMATOUS POLYP OF ASCENDING COLON: ICD-10-CM

## 2024-05-20 DIAGNOSIS — F51.01 PRIMARY INSOMNIA: ICD-10-CM

## 2024-05-20 DIAGNOSIS — J84.89 NSIP (NONSPECIFIC INTERSTITIAL PNEUMONIA) (H): Chronic | ICD-10-CM

## 2024-05-20 DIAGNOSIS — M15.0 PRIMARY OSTEOARTHRITIS INVOLVING MULTIPLE JOINTS: ICD-10-CM

## 2024-05-20 DIAGNOSIS — Z01.818 PREOP GENERAL PHYSICAL EXAM: Primary | ICD-10-CM

## 2024-05-20 DIAGNOSIS — N95.2 ATROPHIC VAGINITIS: ICD-10-CM

## 2024-05-20 LAB
ANION GAP SERPL CALCULATED.3IONS-SCNC: 11 MMOL/L (ref 7–15)
BUN SERPL-MCNC: 14.5 MG/DL (ref 8–23)
CALCIUM SERPL-MCNC: 10.1 MG/DL (ref 8.8–10.2)
CHLORIDE SERPL-SCNC: 99 MMOL/L (ref 98–107)
CREAT SERPL-MCNC: 0.92 MG/DL (ref 0.51–0.95)
DEPRECATED HCO3 PLAS-SCNC: 30 MMOL/L (ref 22–29)
EGFRCR SERPLBLD CKD-EPI 2021: 69 ML/MIN/1.73M2
ERYTHROCYTE [DISTWIDTH] IN BLOOD BY AUTOMATED COUNT: 12.1 % (ref 10–15)
GLUCOSE SERPL-MCNC: 96 MG/DL (ref 70–99)
HCT VFR BLD AUTO: 40.8 % (ref 35–47)
HGB BLD-MCNC: 13.6 G/DL (ref 11.7–15.7)
MCH RBC QN AUTO: 30.8 PG (ref 26.5–33)
MCHC RBC AUTO-ENTMCNC: 33.3 G/DL (ref 31.5–36.5)
MCV RBC AUTO: 92 FL (ref 78–100)
PLATELET # BLD AUTO: 108 10E3/UL (ref 150–450)
POTASSIUM SERPL-SCNC: 4.1 MMOL/L (ref 3.4–5.3)
RBC # BLD AUTO: 4.42 10E6/UL (ref 3.8–5.2)
SODIUM SERPL-SCNC: 140 MMOL/L (ref 135–145)
WBC # BLD AUTO: 6.9 10E3/UL (ref 4–11)

## 2024-05-20 PROCEDURE — 93005 ELECTROCARDIOGRAM TRACING: CPT | Performed by: INTERNAL MEDICINE

## 2024-05-20 PROCEDURE — 99214 OFFICE O/P EST MOD 30 MIN: CPT | Mod: 25 | Performed by: INTERNAL MEDICINE

## 2024-05-20 PROCEDURE — 36415 COLL VENOUS BLD VENIPUNCTURE: CPT | Performed by: INTERNAL MEDICINE

## 2024-05-20 PROCEDURE — 80048 BASIC METABOLIC PNL TOTAL CA: CPT | Performed by: INTERNAL MEDICINE

## 2024-05-20 PROCEDURE — 85027 COMPLETE CBC AUTOMATED: CPT | Performed by: INTERNAL MEDICINE

## 2024-05-20 RX ORDER — RESPIRATORY SYNCYTIAL VISUS VACCINE RECOMBINANT, ADJUVANTED 120MCG/0.5
KIT INTRAMUSCULAR
COMMUNITY
Start: 2023-09-15

## 2024-05-20 RX ORDER — POLYETHYLENE GLYCOL-3350 AND ELECTROLYTES 236; 6.74; 5.86; 2.97; 22.74 G/274.31G; G/274.31G; G/274.31G; G/274.31G; G/274.31G
POWDER, FOR SOLUTION ORAL
COMMUNITY
Start: 2024-05-17

## 2024-05-20 RX ORDER — POLYETHYLENE GLYCOL-3350 AND ELECTROLYTES WITH FLAVOR PACK 240; 5.84; 2.98; 6.72; 22.72 G/278.26G; G/278.26G; G/278.26G; G/278.26G; G/278.26G
POWDER, FOR SOLUTION ORAL
COMMUNITY
Start: 2023-11-16

## 2024-05-20 NOTE — ASSESSMENT & PLAN NOTE
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

## 2024-05-20 NOTE — ASSESSMENT & PLAN NOTE
Cologuard positive 8/8/23, colonoscopy ordered -> 30 mm polyp 8/28/23, unable to be removed. This was removed in OR 12/2023. Now getting 6 month f/up colonoscopy.

## 2024-05-20 NOTE — PROGRESS NOTES
Preoperative Evaluation  [address] [address]  Phone: [phone]  Fax: [phone]  Primary Provider: Deisi Marin MD  Pre-op Performing Provider: Deisi Marin MD  May 20, 2024     Surgical Information  Surgery/Procedure: Colonoscopy  Surgery Location: [facility]  Surgeon: Dr. Kunz  Surgery Date: 6/6/24  Time of Surgery: 11:30 am  Where patient plans to recover: At home with family  Fax number for surgical facility: Note does not need to be faxed, will be available electronically in Epic.    Assessment & Plan     The proposed surgical procedure is considered LOW risk.    Problem List Items Addressed This Visit          Respiratory    NSIP (nonspecific interstitial pneumonia) (H) (Chronic)     Follows with Dr. Berry (pulm) and Dr. Arce (rheum), recent notes reviewed. HR CT chest stable 9/11/23.   - Stable on imuran and symbicort            Digestive    Gastroesophageal reflux disease without esophagitis     Well-controlled with as needed Pepcid 20 mg.         Relevant Medications    GAVILYTE-C 240 g SOLR    GAVILYTE-G 236 g suspension    Adenomatous polyp of ascending colon     Cologuard positive 8/8/23, colonoscopy ordered -> 30 mm polyp 8/28/23, unable to be removed. This was removed in OR 12/2023. Now getting 6 month f/up colonoscopy.             Circulatory    Agatston coronary artery calcium score less than 100     CAC score of 71 (9/19/22), unable to tolerate statin so started on Repatha. Zetia started 1/2023 as LDL was in the low 100s. TSH 2.03. Last LDL 10/2023 was 59.   - Continue repatha and zetia at current doses            Musculoskeletal and Integumentary    Other osteoporosis without current pathological fracture (Chronic)     Patient's bone density has continued did respond very well to Prolia, last DEXA 3/2023 showed 2% increase in BMD in both her hips and spine.  With low fracture risk,  "patient to be class II \"to see if the gains \"made with Prolia.  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          Relevant Medications    Zoledronic Acid (RECLAST IV)       Immune    Sjogren's syndrome (H24)     Follows with Dr. Arce from rheumatology, June visit note reviewed.  - Continue q6 month f/u with rheum  - Continue imuran 50 mg daily            Urinary    Atrophic vaginitis     Well-controlled with Estrace cream.            Hematologic    Platelet disorder (H) (Chronic)     EDTA resistant, clumps easily.   - Platelets slightly low today, likely as a result of this, not a concern for upcoming procedure as true platelet count and clotting ability are normal and intact             Other    Insomnia     Well-controlled with nightly trazodone 200 mg and Seroquel 100 mg.         Preop general physical exam - Primary     Patient presents for pre-op prior to colonoscopy.   - EKG without signs of ischemia today  - BMP stable  - CBC with slightly low platelets, although this is most likely b/c she is EDTA resistant and not a reflection of true thrombocytopenia  - Medication instructions as below  - Approval given prior to surgery          Relevant Orders    Basic metabolic panel (Completed)    EKG 12-lead, tracing only (Completed)    CBC with platelets (Completed)         - No identified additional risk factors other than previously addressed    Antiplatelet or Anticoagulation Medication Instructions   - aspirin: Discontinue aspirin 7-10 days prior to procedure to reduce bleeding risk. It should be resumed postoperatively.     Additional Medication Instructions   - Diuretics: DO NOT TAKE on the day of surgery.   - Statins: Continue taking on the day " of surgery.    - SSRIs, SNRIs, TCAs, Antipsychotics: Continue without modification.    - LABA, inhaled corticosteroid, long-acting anticholinergics: Continue without modification.   - rescue Inhaler: Continue PRN. Bring to hospital on the day of surgery.    Recommendation  Approval given to proceed with proposed procedure, without further diagnostic evaluation.    Ordering of each unique test  Prescription drug management      Subjective   Radha is a 64 year old, presenting for the following:  Pre-Op Exam (6/6/24 Colonoscopy)        5/20/2024    10:18 AM   Additional Questions   Roomed by Amy ODONENLL related to upcoming procedure: Had very large polyp on colonoscopy 8/29/2023.  Had follow-up with advanced endoscopist 12/1/2023 where polyp was removed.  Recommended repeat in 6 months, she is here for preop prior to having this done today.        5/13/2024   Pre-Op Questionnaire   Have you ever had a heart attack or stroke? No   Have you ever had surgery on your heart or blood vessels, such as a stent placement, a coronary artery bypass, or surgery on an artery in your head, neck, heart, or legs? No   Do you have chest pain with activity? No   Do you have a history of heart failure? No   Do you currently have a cold, bronchitis or symptoms of other infection? No   Do you have a cough, shortness of breath, or wheezing? No   Do you or anyone in your family have previous history of blood clots? No   Do you or does anyone in your family have a serious bleeding problem such as prolonged bleeding following surgeries or cuts? No   Have you ever had problems with anemia or been told to take iron pills? No   Have you had any abnormal blood loss such as black, tarry or bloody stools, or abnormal vaginal bleeding? No   Have you ever had a blood transfusion? No   Are you willing to have a blood transfusion if it is medically needed before, during, or after your surgery? Yes   Have you or any of your relatives ever had  problems with anesthesia? No   Do you have sleep apnea, excessive snoring or daytime drowsiness? No   Do you have any artifical heart valves or other implanted medical devices like a pacemaker, defibrillator, or continuous glucose monitor? No   Do you have artificial joints? No   Are you allergic to latex? No     Osteoporosis: Last prolia 10/17/23. Last DEXA 3/2023 showed 2% increase in BMD in hips and spine. Insurance no longer wanted to cover Prolia, so we switched to Reclast, she had infusion 4/19/24.      ILD/NSIP: Had repeat HR CT 9/11/23, NSIP/fibrosis/volume loss stable. Last saw pulm (Dr. Berry) at [facility] 10/4/23. Stable with imuran 50 mg daily      COPD/restrictive lung disease: As above, saw Dr. Berry 10/4/23. Cont symbicort, trial aerobika flutter valve for intermittent wheezing.   - Didn't pick this up b/c she doesn't feel like she is having wheezing     Sjogren's: Follows with Dr. Arce (rheum), sees every 6 months, last visit 6/5/23, stable CBC and CRP normal. On imuran 50 mg daily.      CAD: CAC score of 71 (9/19/22), unable to tolerate high dose statin so started on Repatha. Zetia added in 2023. LDL 59 10/3/23. Also taking ASA 81 mg daily.      Insomnia: Seroquel 100 mg daily, trazodone 200 mg at bedtime.      Atrophic vaginitis: estrace cream      GERD: pepcid 20 mg     Muscle tightness: Flexeril 10 mg BID PRN, maybe uses a few times a month. Always upper back, likely related to playing.      Allergies: zyrtec 10 mg daily      Arthritis: Had L wrist surgery 4/10/24. Got second splint off at last visit. Maybe can start playing again at 8 weeks.      Health Care Directive  Patient does not have a Health Care Directive or Living Will: Discussed advance care planning with patient; information given to patient to review.    Preoperative Review of    reviewed - only post-op pain meds after surgery      Patient Active Problem List    Diagnosis Date Noted    Primary osteoarthritis involving  multiple joints 03/25/2024     Priority: Medium    Preop general physical exam 11/09/2023     Priority: Medium    Adenomatous polyp of ascending colon 10/03/2023     Priority: Medium    Routine general medical examination at a health care facility 03/27/2023     Priority: Medium    Gastroesophageal reflux disease without esophagitis 03/27/2023     Priority: Medium    Atrophic vaginitis 03/27/2023     Priority: Medium    Hypercalciuria 03/27/2023     Priority: Medium    Mild persistent asthma 06/09/2022     Priority: Medium    Screening for cervical cancer 03/09/2021     Priority: Medium     2015, 2017 NIL paps  2/26/21 NIL pap, neg HPV. On Prolia and Imuran. Cotest every 3 years        NSIP (nonspecific interstitial pneumonia) (H)      Priority: Medium      on VATS lung biopsy 7/2012 and has underlying connective tissue disorder,   followed by         Other osteoporosis without current pathological fracture      Priority: Medium     Started Prolia 1/2016        Raynaud's Phenomenon      Priority: Medium     Created by Conversion        Sjogren's Syndrome      Priority: Medium     Has interstitial pneumonitis dry eyes/mouth, skin rash, Raynaud's,   thrombocytopenia, burning tongue syndrome, abnormal serology including   positive TOBY, SSA antibodies, rheumatoid factor, and IgM cardiolipin   antibodies and she is followed by Dr. Arce from rheumatology   currently on prednisone and Imuran        Agatston coronary artery calcium score less than 100 06/09/2017     Priority: Medium     Score of 7 in 2017        Vasomotor Rhinitis      Priority: Medium     Created by Conversion  Replacement Utility updated for latest IMO load        Insomnia 06/08/2016     Priority: Medium     Does not get into deep/restorative sleep without medications        Platelet disorder (H) 06/08/2016     Priority: Medium     EDTA resistant        Cervicalgia      Priority: Medium     Created by Conversion        Lumbar Radiculopathy   "    Priority: Medium     Created by Conversion        Sjogren's syndrome (H24) 02/07/2014     Priority: Medium     Formatting of this note might be different from the original.  Has interstitial pneumonitis dry eyes/mouth, skin rash, Raynaud's, thrombocytopenia, burning tongue syndrome, abnormal serology including positive TOBY, SSA antibodies, rheumatoid factor, and IgM cardiolipin antibodies and she is followed by Dr. Arce from rheumatology currently on prednisone and Imuran        Past Medical History:   Diagnosis Date    Arthritis 6/1/1999    Cervicalgia     Heart disease 7/13/2021    Calcification on an aorta    Idiopathic thrombocytopenic purpura (H)     Interstitial lung disease (H)     Interstitial pulmonary disease (H)     Lumbar radiculopathy     Osteopenia     Raynaud phenomenon     Right shoulder pain     Sjoegren syndrome     Uncomplicated asthma 6/9/2022    Vasomotor rhinitis      Past Surgical History:   Procedure Laterality Date    BIOPSY  7/2012, 11/7/2022    surgical lung biopsy, uterine biopsy    COLONOSCOPY N/A 08/29/2023    Procedure: Colonoscopy WITH POLYPECTOMY;  Surgeon: Eda Clements MD;  Location: [facility] OR    EYE SURGERY  3/3/22    cataract    HC REPAIR OF NASAL SEPTUM      Description: Septoplasty;  Recorded: 07/12/2012;    IR CERVICAL EPIDURAL STEROID INJECTION  03/23/2009    ORTHOPEDIC SURGERY  7/2015, 12/29/21    shoulder, [place] canal release    NM THORACOSCOPY W/DX BX OF LUNG INFILTRATE UNILATRL      Description: Thoracoscopy Of Lungs And Pleural Space With Biopsy Of Lung Infiltrates;  Recorded: 07/23/2012;     Current Outpatient Medications   Medication Sig Dispense Refill    AREXVY injection       aspirin 81 MG EC tablet Take 81 mg by mouth daily      azaTHIOprine (IMURAN) 50 mg tablet Take 1 tablet by mouth daily       bisacodyl (DULCOLAX) 5 MG EC tablet Refer to \"Getting Ready for a Colonoscopy\" instruction handout 4 tablet 0    calcium carbonate (OS-KOLE) 600 mg calcium " "(1,500 mg) tablet Take 600 mg by mouth 2 times daily (with meals)      cetirizine (ZYRTEC) 10 MG tablet Take 10 mg by mouth daily      estradiol (ESTRACE) 0.1 MG/GM vaginal cream INSERT 1 GRAM INTO THE VAGINA 1-3 TIMES PER WEEK. 42.5 g 3    evolocumab (REPATHA) 140 MG/ML prefilled autoinjector Inject 1 mL (140 mg) Subcutaneous every 14 days 6 mL 3    ezetimibe (ZETIA) 10 MG tablet Take 1 tablet (10 mg) by mouth daily 90 tablet 3    famotidine (PEPCID) 20 MG tablet Take 20 mg by mouth At Bedtime      Flaxseed, Linseed, (FLAX SEED OIL) 1000 MG capsule Take 1 capsule by mouth daily For dry eyes       GAVILYTE-C 240 g SOLR Take 4,000 mLs by mouth once for 1 dose Refer to \"Getting Ready for a Colonoscopy\" instruction handout      GAVILYTE-G 236 g suspension       magnesium chloride 535 (64 Mg) MG TBEC CR tablet Take 535 mg by mouth daily      methocarbamol (ROBAXIN) 750 MG tablet Take 1 tablet (750 mg) by mouth 4 times daily as needed for muscle spasms 90 tablet 3    ondansetron (ZOFRAN ODT) 4 MG ODT tab Take 1 tablet (4 mg) by mouth every 6 hours as needed for nausea (as needed for nausea with bowel prep) 3 tablet 0    ondansetron (ZOFRAN ODT) 4 MG ODT tab Take 1 tablet (4 mg) by mouth every 6 hours as needed for nausea (as needed for nausea with bowel prep) 3 tablet 0    potassium chloride ER (KLOR-CON M) 20 MEQ CR tablet TAKE 1 TABLET (20 MEQ TOTAL) BY MOUTH DAILY. Strength: 20 mEq 90 tablet 3    QUEtiapine (SEROQUEL) 100 MG tablet Take 1 tablet (100 mg) by mouth At Bedtime 90 tablet 3    spironolactone-HCTZ (ALDACTAZIDE) 25-25 MG tablet Take 1 tablet by mouth daily 90 tablet 3    SYMBICORT 80-4.5 MCG/ACT Inhaler INHALE 2 PUFFS BY MOUTH 2 TIMES DAILY.      traZODone (DESYREL) 100 MG tablet Take 2 tablets (200 mg) by mouth At Bedtime 180 tablet 3    tretinoin (RETIN-A) 0.025 % external cream APPLY TOPICALLY TO AFFECTED AREA OF FACE AT BEDTIME      Zoledronic Acid (RECLAST IV)          Allergies   Allergen Reactions    " "Crestor [Rosuvastatin] Cramps     Tolerating 20mg dose         Social History     Tobacco Use    Smoking status: Never     Passive exposure: Never    Smokeless tobacco: Never   Substance Use Topics    Alcohol use: Yes     Comment: 4 drinks per week     Family History   Problem Relation Age of Onset    Cancer Father         Mesothelioma,  age 71    Hyperlipidemia Father     Prostate Cancer Father     Other Cancer Father         Mesothelioma    Diabetes Brother          age 43    Diabetes Type 1 Son     Diabetes Son     Depression Son     Anxiety Disorder Son     Parkinsonism Mother     Osteoporosis Mother     Alcoholism Brother     Cerebrovascular Disease Maternal Grandmother     Coronary Artery Disease Brother     Hypertension Brother     Hyperlipidemia Brother      History   Drug Use No           Review of Systems  Constitutional, neuro, ENT, endocrine, pulmonary, cardiac, gastrointestinal, genitourinary, musculoskeletal, integument and psychiatric systems are negative, except as otherwise noted.    Objective    /62   Pulse 83   Temp 98.8  F (37.1  C) (Oral)   Resp 16   Ht 1.676 m (5' 6\")   Wt 56.5 kg (124 lb 9.6 oz)   LMP  (LMP Unknown)   SpO2 98%   BMI 20.11 kg/m     Estimated body mass index is 20.11 kg/m  as calculated from the following:    Height as of this encounter: 1.676 m (5' 6\").    Weight as of this encounter: 56.5 kg (124 lb 9.6 oz).    Exam:  Constitutional: healthy, alert, and no distress  Head: Normocephalic. No masses, lesions, tenderness or abnormalities  ENT: ENT exam normal, no neck nodes or sinus tenderness  Cardiovascular: RRR. No murmurs, clicks gallops or rub  Respiratory: CTAB. Good diaphragmatic excursion. No wheezes rhonchi or rales.  Gastrointestinal: Abdomen soft, non-tender. BS normal. No masses, organomegaly  Musculoskeletal: extremities normal- no gross deformities noted, gait normal, and normal muscle tone  Skin: no suspicious lesions or rashes  Neurologic: " Gait normal. No focal deficits Sensation grossly WNL.  Psychiatric: mentation appears normal and affect normal/bright    Diagnostics  Recent Results (from the past 24 hour(s))   EKG 12-lead, tracing only    Collection Time: 05/20/24 10:00 AM   Result Value Ref Range    Systolic Blood Pressure  mmHg    Diastolic Blood Pressure  mmHg    Ventricular Rate 68 BPM    Atrial Rate 68 BPM    DE Interval 192 ms    QRS Duration 84 ms     ms    QTc 433 ms    P Axis 51 degrees    R AXIS -38 degrees    T Axis 46 degrees    Interpretation ECG       ** Poor data quality, interpretation may be adversely affected  Sinus rhythm  Left axis deviation  Anteroseptal infarct (cited on or before 08-NOV-2023)  Abnormal ECG  When compared with ECG of 08-NOV-2023 11:02,  No significant change was found     Basic metabolic panel    Collection Time: 05/20/24 11:24 AM   Result Value Ref Range    Sodium 140 135 - 145 mmol/L    Potassium 4.1 3.4 - 5.3 mmol/L    Chloride 99 98 - 107 mmol/L    Carbon Dioxide (CO2) 30 (H) 22 - 29 mmol/L    Anion Gap 11 7 - 15 mmol/L    Urea Nitrogen 14.5 8.0 - 23.0 mg/dL    Creatinine 0.92 0.51 - 0.95 mg/dL    GFR Estimate 69 >60 mL/min/1.73m2    Calcium 10.1 8.8 - 10.2 mg/dL    Glucose 96 70 - 99 mg/dL   CBC with platelets    Collection Time: 05/20/24 11:24 AM   Result Value Ref Range    WBC Count 6.9 4.0 - 11.0 10e3/uL    RBC Count 4.42 3.80 - 5.20 10e6/uL    Hemoglobin 13.6 11.7 - 15.7 g/dL    Hematocrit 40.8 35.0 - 47.0 %    MCV 92 78 - 100 fL    MCH 30.8 26.5 - 33.0 pg    MCHC 33.3 31.5 - 36.5 g/dL    RDW 12.1 10.0 - 15.0 %    Platelet Count 108 (L) 150 - 450 10e3/uL        Revised Cardiac Risk Index (RCRI)  The patient has the following serious cardiovascular risks for perioperative complications:   - No serious cardiac risks = 0 points     RCRI Interpretation: 0 points: Class I (very low risk - 0.4% complication rate)

## 2024-05-20 NOTE — PROGRESS NOTES
Preoperative Evaluation  [address] [address]  Phone: [phone]  Fax: [phone]  Primary Provider: Deisi Marin MD  Pre-op Performing Provider: Deisi Marin MD  May 20, 2024   {Provider  Link to PREOP SmartSet  REQUIRED to apply standard patient instructions and medication directions to the AVS :418668}  {ROOMER review and update patient entered surgical information if needed :821467}  Surgical Information  Surgery/Procedure: ***  Surgery Location: ***  Surgeon: ***  Surgery Date: ***  Time of Surgery: ***  Where patient plans to recover: {Preop post recovery plans :889348}  Fax number for surgical facility: {:761962}    {Provider Charting Preference for Preop :941852}    Felipa Garcia is a 64 year old, presenting for the following:  Pre-Op Exam (6/6/24 Colonoscopy)          5/20/2024    10:18 AM   Additional Questions   Roomed by Amy ODONNELL related to upcoming procedure: ***        5/13/2024   Pre-Op Questionnaire   Have you ever had a heart attack or stroke? No   Have you ever had surgery on your heart or blood vessels, such as a stent placement, a coronary artery bypass, or surgery on an artery in your head, neck, heart, or legs? No   Do you have chest pain with activity? No   Do you have a history of heart failure? No   Do you currently have a cold, bronchitis or symptoms of other infection? No   Do you have a cough, shortness of breath, or wheezing? No   Do you or anyone in your family have previous history of blood clots? No   Do you or does anyone in your family have a serious bleeding problem such as prolonged bleeding following surgeries or cuts? No   Have you ever had problems with anemia or been told to take iron pills? No   Have you had any abnormal blood loss such as black, tarry or bloody stools, or abnormal vaginal bleeding? No   Have you ever had a blood transfusion? No   Are you  willing to have a blood transfusion if it is medically needed before, during, or after your surgery? Yes   Have you or any of your relatives ever had problems with anesthesia? No   Do you have sleep apnea, excessive snoring or daytime drowsiness? No   Do you have any artifical heart valves or other implanted medical devices like a pacemaker, defibrillator, or continuous glucose monitor? No   Do you have artificial joints? No   Are you allergic to latex? No     Health Care Directive  Patient does not have a Health Care Directive or Living Will: {ADVANCE_DIRECTIVE_STATUS:982727}    Preoperative Review of   {Mnpmpreport:157670}  {Review MNPMP for all patients per [facility] MNPMP Profile:390099}    {Chronic problem details (Optional) :895284}    Patient Active Problem List    Diagnosis Date Noted    Primary osteoarthritis involving multiple joints 03/25/2024     Priority: Medium    Preop general physical exam 11/09/2023     Priority: Medium    Adenomatous polyp of ascending colon 10/03/2023     Priority: Medium    Routine general medical examination at a health care facility 03/27/2023     Priority: Medium    Gastroesophageal reflux disease without esophagitis 03/27/2023     Priority: Medium    Atrophic vaginitis 03/27/2023     Priority: Medium    Hypercalciuria 03/27/2023     Priority: Medium    Mild persistent asthma 06/09/2022     Priority: Medium    Screening for cervical cancer 03/09/2021     Priority: Medium     2015, 2017 NIL paps  2/26/21 NIL pap, neg HPV. On Prolia and Imuran. Cotest every 3 years        NSIP (nonspecific interstitial pneumonia) (H)      Priority: Medium      on VATS lung biopsy 7/2012 and has underlying connective tissue disorder,   followed by         Other osteoporosis without current pathological fracture      Priority: Medium     Started Prolia 1/2016        Raynaud's Phenomenon      Priority: Medium     Created by Conversion        Sjogren's Syndrome      Priority: Medium     Has  interstitial pneumonitis dry eyes/mouth, skin rash, Raynaud's,   thrombocytopenia, burning tongue syndrome, abnormal serology including   positive TOBY, SSA antibodies, rheumatoid factor, and IgM cardiolipin   antibodies and she is followed by Dr. Arce from rheumatology   currently on prednisone and Imuran        Agatston coronary artery calcium score less than 100 06/09/2017     Priority: Medium     Score of 7 in 2017        Vasomotor Rhinitis      Priority: Medium     Created by Conversion  Replacement Utility updated for latest IMO load        Insomnia 06/08/2016     Priority: Medium     Does not get into deep/restorative sleep without medications        Platelet disorder (H) 06/08/2016     Priority: Medium     EDTA resistant        Cervicalgia      Priority: Medium     Created by Conversion        Lumbar Radiculopathy      Priority: Medium     Created by Conversion        Sjogren's syndrome (H24) 02/07/2014     Priority: Medium     Formatting of this note might be different from the original.  Has interstitial pneumonitis dry eyes/mouth, skin rash, Raynaud's, thrombocytopenia, burning tongue syndrome, abnormal serology including positive TOBY, SSA antibodies, rheumatoid factor, and IgM cardiolipin antibodies and she is followed by Dr. Arce from rheumatology currently on prednisone and Imuran        Past Medical History:   Diagnosis Date    Arthritis 6/1/1999    Cervicalgia     Heart disease 7/13/2021    Calcification on an aorta    Idiopathic thrombocytopenic purpura (H)     Interstitial lung disease (H)     Interstitial pulmonary disease (H)     Lumbar radiculopathy     Osteopenia     Raynaud phenomenon     Right shoulder pain     Sjoegren syndrome     Uncomplicated asthma 6/9/2022    Vasomotor rhinitis      Past Surgical History:   Procedure Laterality Date    BIOPSY  7/2012, 11/7/2022    surgical lung biopsy, uterine biopsy    COLONOSCOPY N/A 08/29/2023    Procedure: Colonoscopy WITH POLYPECTOMY;   "Surgeon: Eda Clements MD;  Location: [facility] OR    EYE SURGERY  3/3/22    cataract    HC REPAIR OF NASAL SEPTUM      Description: Septoplasty;  Recorded: 07/12/2012;    IR CERVICAL EPIDURAL STEROID INJECTION  03/23/2009    ORTHOPEDIC SURGERY  7/2015, 12/29/21    shoulder, Guyan canal release    ND THORACOSCOPY W/DX BX OF LUNG INFILTRATE UNILATRL      Description: Thoracoscopy Of Lungs And Pleural Space With Biopsy Of Lung Infiltrates;  Recorded: 07/23/2012;     Current Outpatient Medications   Medication Sig Dispense Refill    AREXVY injection       aspirin 81 MG EC tablet Take 81 mg by mouth daily      azaTHIOprine (IMURAN) 50 mg tablet Take 1 tablet by mouth daily       bisacodyl (DULCOLAX) 5 MG EC tablet Refer to \"Getting Ready for a Colonoscopy\" instruction handout 4 tablet 0    calcium carbonate (OS-KOLE) 600 mg calcium (1,500 mg) tablet Take 600 mg by mouth 2 times daily (with meals)      cetirizine (ZYRTEC) 10 MG tablet Take 10 mg by mouth daily      estradiol (ESTRACE) 0.1 MG/GM vaginal cream INSERT 1 GRAM INTO THE VAGINA 1-3 TIMES PER WEEK. 42.5 g 3    evolocumab (REPATHA) 140 MG/ML prefilled autoinjector Inject 1 mL (140 mg) Subcutaneous every 14 days 6 mL 3    ezetimibe (ZETIA) 10 MG tablet Take 1 tablet (10 mg) by mouth daily 90 tablet 3    famotidine (PEPCID) 20 MG tablet Take 20 mg by mouth At Bedtime      Flaxseed, Linseed, (FLAX SEED OIL) 1000 MG capsule Take 1 capsule by mouth daily For dry eyes       GAVILYTE-C 240 g SOLR Take 4,000 mLs by mouth once for 1 dose Refer to \"Getting Ready for a Colonoscopy\" instruction handout      GAVILYTE-G 236 g suspension       magnesium chloride 535 (64 Mg) MG TBEC CR tablet Take 535 mg by mouth daily      methocarbamol (ROBAXIN) 750 MG tablet Take 1 tablet (750 mg) by mouth 4 times daily as needed for muscle spasms 90 tablet 3    ondansetron (ZOFRAN ODT) 4 MG ODT tab Take 1 tablet (4 mg) by mouth every 6 hours as needed for nausea (as needed for nausea with bowel " "prep) 3 tablet 0    ondansetron (ZOFRAN ODT) 4 MG ODT tab Take 1 tablet (4 mg) by mouth every 6 hours as needed for nausea (as needed for nausea with bowel prep) 3 tablet 0    potassium chloride ER (KLOR-CON M) 20 MEQ CR tablet TAKE 1 TABLET (20 MEQ TOTAL) BY MOUTH DAILY. Strength: 20 mEq 90 tablet 3    QUEtiapine (SEROQUEL) 100 MG tablet Take 1 tablet (100 mg) by mouth At Bedtime 90 tablet 3    spironolactone-HCTZ (ALDACTAZIDE) 25-25 MG tablet Take 1 tablet by mouth daily 90 tablet 3    SYMBICORT 80-4.5 MCG/ACT Inhaler INHALE 2 PUFFS BY MOUTH 2 TIMES DAILY.      traZODone (DESYREL) 100 MG tablet Take 2 tablets (200 mg) by mouth At Bedtime 180 tablet 3    tretinoin (RETIN-A) 0.025 % external cream APPLY TOPICALLY TO AFFECTED AREA OF FACE AT BEDTIME      Zoledronic Acid (RECLAST IV)          Allergies   Allergen Reactions    Crestor [Rosuvastatin] Cramps     Tolerating 20mg dose         Social History     Tobacco Use    Smoking status: Never     Passive exposure: Never    Smokeless tobacco: Never   Substance Use Topics    Alcohol use: Yes     Comment: 4 drinks per week     {FAMILY HISTORY (Optional):965327766}  History   Drug Use No           {ROS Picklists (Optional):085330}    Objective    /62   Pulse 83   Temp 98.8  F (37.1  C) (Oral)   Resp 16   Ht 1.676 m (5' 6\")   Wt 56.5 kg (124 lb 9.6 oz)   LMP  (LMP Unknown)   SpO2 98%   BMI 20.11 kg/m     Estimated body mass index is 20.11 kg/m  as calculated from the following:    Height as of this encounter: 1.676 m (5' 6\").    Weight as of this encounter: 56.5 kg (124 lb 9.6 oz).  Physical Exam  {Exam List (Optional):425938}    Recent Labs   Lab Test 03/25/24  0914 11/08/23  1125 10/17/23  1319   HGB 14.5 14.5  --    * 130*  --     139  --    POTASSIUM 4.4 4.2  --    CR 0.98* 0.94  --    A1C 5.5  --  5.4                 Signed Electronically by: Deisi Marin MD  Copy of this evaluation report is provided to requesting " physician.

## 2024-05-20 NOTE — ASSESSMENT & PLAN NOTE
"Patient's bone density has continued did respond very well to Prolia, last DEXA 3/2023 showed 2% increase in BMD in both her hips and spine.  With low fracture risk, patient to be class II \"to see if the gains \"made with Prolia.  Had first infusion 4/19/2024, tolerated well   - Plan to repeat DEXA 3/2024  "

## 2024-05-20 NOTE — PATIENT INSTRUCTIONS
How to Take Your Medication Before Surgery  Preoperative Medication Instructions   Antiplatelet or Anticoagulation Medication Instructions   - aspirin: Discontinue aspirin 7-10 days prior to procedure to reduce bleeding risk. It should be resumed postoperatively.     Additional Medication Instructions   - Diuretics: DO NOT TAKE on the day of surgery.   - LABA, inhaled corticosteroid, long-acting anticholinergics: Continue without modification.   - rescue Inhaler: Continue PRN. Bring to hospital on the day of surgery.       Patient Education   Preparing for Your Surgery  Getting started  A nurse will call you to review your health history and instructions. They will give you an arrival time based on your scheduled surgery time. Please be ready to share:  Your doctor's clinic name and phone number  Your medical, surgical, and anesthesia history  A list of allergies and sensitivities  A list of medicines, including herbal treatments and over-the-counter drugs  Whether the patient has a legal guardian (ask how to send us the papers in advance)  Please tell us if you're pregnant--or if there's any chance you might be pregnant. Some surgeries may injure a fetus (unborn baby), so they require a pregnancy test. Surgeries that are safe for a fetus don't always need a test, and you can choose whether to have one.   If you have a child who's having surgery, please ask for a copy of Preparing for Your Child's Surgery.    Preparing for surgery  Within 10 to 30 days of surgery: Have a pre-op exam (sometimes called an H&P, or History and Physical). This can be done at a clinic or pre-operative center.  If you're having a , you may not need this exam. Talk to your care team.  At your pre-op exam, talk to your care team about all medicines you take. If you need to stop any medicines before surgery, ask when to start taking them again.  We do this for your safety. Many medicines can make you bleed too much during surgery.  Some change how well surgery (anesthesia) drugs work.  Call your insurance company to let them know you're having surgery. (If you don't have insurance, call [phone].)  Call your clinic if there's any change in your health. This includes signs of a cold or flu (sore throat, runny nose, cough, rash, fever). It also includes a scrape or scratch near the surgery site.  If you have questions on the day of surgery, call your hospital or surgery center.  Eating and drinking guidelines  For your safety: Unless your surgeon tells you otherwise, follow the guidelines below.  Eat and drink as usual until 8 hours before you arrive for surgery. After that, no food or milk.  Drink clear liquids until 2 hours before you arrive. These are liquids you can see through, like water, Gatorade, and Propel Water. They also include plain black coffee and tea (no cream or milk), candy, and breath mints. You can spit out gum when you arrive.  If you drink alcohol: Stop drinking it the night before surgery.  If your care team tells you to take medicine on the morning of surgery, it's okay to take it with a sip of water.  Preventing infection  Shower or bathe the night before and morning of your surgery. Follow the instructions your clinic gave you. (If no instructions, use regular soap.)  Don't shave or clip hair near your surgery site. We'll remove the hair if needed.  Don't smoke or vape the morning of surgery. You may chew nicotine gum up to 2 hours before surgery. A nicotine patch is okay.  Note: Some surgeries require you to completely quit smoking and nicotine. Check with your surgeon.  Your care team will make every effort to keep you safe from infection. We will:  Clean our hands often with soap and water (or an alcohol-based hand rub).  Clean the skin at your surgery site with a special soap that kills germs.  Give you a special gown to keep you warm. (Cold raises the risk of infection.)  Wear special hair covers, masks, gowns  and gloves during surgery.  Give antibiotic medicine, if prescribed. Not all surgeries need antibiotics.  What to bring on the day of surgery  Photo ID and insurance card  Copy of your health care directive, if you have one  Glasses and hearing aids (bring cases)  You can't wear contacts during surgery  Inhaler and eye drops, if you use them (tell us about these when you arrive)  CPAP machine or breathing device, if you use them  A few personal items, if spending the night  If you have . . .  A pacemaker, ICD (cardiac defibrillator) or other implant: Bring the ID card.  An implanted stimulator: Bring the remote control.  A legal guardian: Bring a copy of the certified (court-stamped) guardianship papers.  Please remove any jewelry, including body piercings. Leave jewelry and other valuables at home.  If you're going home the day of surgery  You must have a responsible adult drive you home. They should stay with you overnight as well.  If you don't have someone to stay with you, and you aren't safe to go home alone, we may keep you overnight. Insurance often won't pay for this.  After surgery  If it's hard to control your pain or you need more pain medicine, please call your surgeon's office.  Questions?   If you have any questions for your care team, list them here: _________________________________________________________________________________________________________________________________________________________________________ ____________________________________ ____________________________________ ____________________________________  For informational purposes only. Not to replace the advice of your health care provider. Copyright   2003, 2019 [facility]. All rights reserved. Clinically reviewed by Jenna Sinha MD. [employer] [id number] - REV 12/22.

## 2024-05-20 NOTE — ASSESSMENT & PLAN NOTE
Patient presents for pre-op prior to colonoscopy.   - EKG without signs of ischemia today  - BMP stable  - CBC with slightly low platelets, although this is most likely b/c she is EDTA resistant and not a reflection of true thrombocytopenia  - Medication instructions as below  - Approval given prior to surgery

## 2024-05-21 LAB
ATRIAL RATE - MUSE: 68 BPM
DIASTOLIC BLOOD PRESSURE - MUSE: NORMAL MMHG
INTERPRETATION ECG - MUSE: NORMAL
P AXIS - MUSE: 51 DEGREES
PR INTERVAL - MUSE: 192 MS
QRS DURATION - MUSE: 84 MS
QT - MUSE: 408 MS
QTC - MUSE: 433 MS
R AXIS - MUSE: -38 DEGREES
SYSTOLIC BLOOD PRESSURE - MUSE: NORMAL MMHG
T AXIS - MUSE: 46 DEGREES
VENTRICULAR RATE- MUSE: 68 BPM

## 2024-05-21 PROCEDURE — 93010 ELECTROCARDIOGRAM REPORT: CPT | Performed by: GENERAL ACUTE CARE HOSPITAL

## 2024-05-29 ENCOUNTER — TRANSFERRED RECORDS (OUTPATIENT)
Dept: HEALTH INFORMATION MANAGEMENT | Facility: CLINIC | Age: 64
End: 2024-05-29
Payer: COMMERCIAL

## 2024-05-29 LAB
ALT SERPL-CCNC: 14 IU/L (ref 5–35)
AST SERPL-CCNC: 30 U/L (ref 5–34)
CREATININE (EXTERNAL): 0.9 MG/DL (ref 0.5–1.3)

## 2024-05-29 RX ORDER — LIDOCAINE 40 MG/G
CREAM TOPICAL
Status: CANCELLED | OUTPATIENT
Start: 2024-05-29

## 2024-05-29 RX ORDER — ONDANSETRON 2 MG/ML
4 INJECTION INTRAMUSCULAR; INTRAVENOUS
Status: CANCELLED | OUTPATIENT
Start: 2024-05-29

## 2024-06-05 ENCOUNTER — ANESTHESIA EVENT (OUTPATIENT)
Dept: GASTROENTEROLOGY | Facility: CLINIC | Age: 64
End: 2024-06-05
Payer: COMMERCIAL

## 2024-06-05 NOTE — ANESTHESIA PREPROCEDURE EVALUATION
Anesthesia Pre-Procedure Evaluation    Patient: Radha Morrison   MRN: [id number] : 1960        Procedure : Procedure(s):  COLONOSCOPY          Past Medical History:   Diagnosis Date     Arthritis 1999     Cervicalgia      Heart disease 2021    Calcification on an aorta     Idiopathic thrombocytopenic purpura (H)      Interstitial lung disease (H)      Interstitial pulmonary disease (H)      Lumbar radiculopathy      Osteopenia      Raynaud phenomenon      Right shoulder pain      Sjoegren syndrome      Uncomplicated asthma 2022     Vasomotor rhinitis       Past Surgical History:   Procedure Laterality Date     BIOPSY  2012, 2022    surgical lung biopsy, uterine biopsy     COLONOSCOPY N/A 2023    Procedure: Colonoscopy WITH POLYPECTOMY;  Surgeon: Eda Clements MD;  Location: [facility] OR     EYE SURGERY  3/3/22    cataract     HC REPAIR OF NASAL SEPTUM      Description: Septoplasty;  Recorded: 2012;     IR CERVICAL EPIDURAL STEROID INJECTION  2009     ORTHOPEDIC SURGERY  2015, 21    shoulder, Guyan canal release     CT THORACOSCOPY W/DX BX OF LUNG INFILTRATE UNILATRL      Description: Thoracoscopy Of Lungs And Pleural Space With Biopsy Of Lung Infiltrates;  Recorded: 2012;      Allergies   Allergen Reactions     Crestor [Rosuvastatin] Cramps     Tolerating 20mg dose       Social History     Tobacco Use     Smoking status: Never     Passive exposure: Never     Smokeless tobacco: Never   Substance Use Topics     Alcohol use: Yes     Comment: 4 drinks per week      Wt Readings from Last 1 Encounters:   24 56.5 kg (124 lb 9.6 oz)        Anesthesia Evaluation            ROS/MED HX  ENT/Pulmonary: Comment: Sjogren's    Interstitial lung dz.     (+)                      asthma                  Neurologic:       Cardiovascular:     (+) Dyslipidemia - -  CAD -  - -                                      METS/Exercise Tolerance:     Hematologic: Comments: H/o ITP     "  Musculoskeletal:  - neg musculoskeletal ROS     GI/Hepatic:     (+) GERD,                   Renal/Genitourinary:  - neg Renal ROS     Endo:       Psychiatric/Substance Use:     (+) psychiatric history anxiety       Infectious Disease:       Malignancy:       Other:            Physical Exam    Airway        Mallampati: II   TM distance: > 3 FB   Neck ROM: full   Mouth opening: > 3 cm    Respiratory Devices and Support         Dental       (+) Modest Abnormalities - crowns, retainers, 1 or 2 missing teeth      Cardiovascular   cardiovascular exam normal          Pulmonary   pulmonary exam normal            OUTSIDE LABS:  CBC:   Lab Results   Component Value Date    WBC 6.9 05/20/2024    WBC 5.9 03/25/2024    HGB 13.6 05/20/2024    HGB 14.5 03/25/2024    HCT 40.8 05/20/2024    HCT 44.0 03/25/2024     (L) 05/20/2024     (L) 03/25/2024     BMP:   Lab Results   Component Value Date     05/20/2024     03/25/2024    POTASSIUM 4.1 05/20/2024    POTASSIUM 4.4 03/25/2024    CHLORIDE 99 05/20/2024    CHLORIDE 100 03/25/2024    CO2 30 (H) 05/20/2024    CO2 28 03/25/2024    BUN 14.5 05/20/2024    BUN 18.9 03/25/2024    CR 0.92 05/20/2024    CR 0.98 (H) 03/25/2024    GLC 96 05/20/2024     (H) 03/25/2024     COAGS: No results found for: \"PTT\", \"INR\", \"FIBR\"  POC: No results found for: \"BGM\", \"HCG\", \"HCGS\"  HEPATIC:   Lab Results   Component Value Date    ALBUMIN 4.8 03/25/2024    PROTTOTAL 7.3 03/25/2024    ALT 13 03/25/2024    AST 26 03/25/2024    ALKPHOS 48 03/25/2024    BILITOTAL 0.4 03/25/2024     OTHER:   Lab Results   Component Value Date    A1C 5.5 03/25/2024    KOLE 10.1 05/20/2024    MAG 2.0 09/06/2022    TSH 1.39 03/25/2024       Anesthesia Plan    ASA Status:  2    NPO Status:  NPO Appropriate    Anesthesia Type: MAC.     - Reason for MAC: straight local not clinically adequate              Consents    Anesthesia Plan(s) and associated risks, benefits, and realistic alternatives " discussed. Questions answered and patient/representative(s) expressed understanding.     - Discussed:     - Discussed with:  Patient            Postoperative Care            Comments:               Van Mcdermott, DO, DO    I have reviewed the pertinent notes and labs in the chart from the past 30 days and (re)examined the patient.  Any updates or changes from those notes are reflected in this note.             # Thrombocytopenia: Lowest platelets = 108 in last 30 days, will monitor for bleeding

## 2024-06-06 ENCOUNTER — ANESTHESIA (OUTPATIENT)
Dept: GASTROENTEROLOGY | Facility: CLINIC | Age: 64
End: 2024-06-06
Payer: COMMERCIAL

## 2024-06-06 ENCOUNTER — HOSPITAL ENCOUNTER (OUTPATIENT)
Facility: CLINIC | Age: 64
Discharge: HOME | End: 2024-06-06
Attending: INTERNAL MEDICINE | Admitting: INTERNAL MEDICINE
Payer: COMMERCIAL

## 2024-06-06 VITALS
RESPIRATION RATE: 30 BRPM | SYSTOLIC BLOOD PRESSURE: 120 MMHG | OXYGEN SATURATION: 97 % | HEART RATE: 74 BPM | DIASTOLIC BLOOD PRESSURE: 58 MMHG

## 2024-06-06 LAB — COLONOSCOPY: NORMAL

## 2024-06-06 PROCEDURE — G0105 COLORECTAL SCRN; HI RISK IND: HCPCS | Performed by: INTERNAL MEDICINE

## 2024-06-06 PROCEDURE — 370N000017 HC ANESTHESIA TECHNICAL FEE, PER MIN: Performed by: INTERNAL MEDICINE

## 2024-06-06 PROCEDURE — 45378 DIAGNOSTIC COLONOSCOPY: CPT | Performed by: NURSE ANESTHETIST, CERTIFIED REGISTERED

## 2024-06-06 PROCEDURE — 258N000003 HC RX IP 258 OP 636: Mod: JZ | Performed by: NURSE ANESTHETIST, CERTIFIED REGISTERED

## 2024-06-06 PROCEDURE — 999N000010 HC STATISTIC ANES STAT CODE-CRNA PER MINUTE: Performed by: INTERNAL MEDICINE

## 2024-06-06 PROCEDURE — 45378 DIAGNOSTIC COLONOSCOPY: CPT | Performed by: ANESTHESIOLOGY

## 2024-06-06 PROCEDURE — 45378 DIAGNOSTIC COLONOSCOPY: CPT | Performed by: INTERNAL MEDICINE

## 2024-06-06 PROCEDURE — 250N000011 HC RX IP 250 OP 636: Performed by: NURSE ANESTHETIST, CERTIFIED REGISTERED

## 2024-06-06 RX ORDER — SODIUM CHLORIDE, SODIUM LACTATE, POTASSIUM CHLORIDE, CALCIUM CHLORIDE 600; 310; 30; 20 MG/100ML; MG/100ML; MG/100ML; MG/100ML
INJECTION, SOLUTION INTRAVENOUS CONTINUOUS PRN
Status: DISCONTINUED | OUTPATIENT
Start: 2024-06-06 | End: 2024-06-06

## 2024-06-06 RX ORDER — PROPOFOL 10 MG/ML
INJECTION, EMULSION INTRAVENOUS CONTINUOUS PRN
Status: DISCONTINUED | OUTPATIENT
Start: 2024-06-06 | End: 2024-06-06

## 2024-06-06 RX ADMIN — PROPOFOL 200 MCG/KG/MIN: 10 INJECTION, EMULSION INTRAVENOUS at 11:24

## 2024-06-06 RX ADMIN — SODIUM CHLORIDE, POTASSIUM CHLORIDE, SODIUM LACTATE AND CALCIUM CHLORIDE: 600; 310; 30; 20 INJECTION, SOLUTION INTRAVENOUS at 11:24

## 2024-06-06 ASSESSMENT — ACTIVITIES OF DAILY LIVING (ADL)
ADLS_ACUITY_SCORE: 36
ADLS_ACUITY_SCORE: 36

## 2024-06-06 NOTE — ANESTHESIA POSTPROCEDURE EVALUATION
Patient: Radha Morrison    Procedure: Procedure(s):  COLONOSCOPY       Anesthesia Type:  MAC    Note:     Postop Pain Control: Uneventful            Sign Out: Well controlled pain   PONV: No   Neuro/Psych: Uneventful            Sign Out: Acceptable/Baseline neuro status   Airway/Respiratory: Uneventful            Sign Out: Acceptable/Baseline resp. status   CV/Hemodynamics: Uneventful            Sign Out: Acceptable CV status; No obvious hypovolemia; No obvious fluid overload   Other NRE: NONE   DID A NON-ROUTINE EVENT OCCUR?        Last vitals:  Vitals Value Taken Time   BP     Temp     Pulse 74 06/06/24 1204   Resp 19 06/06/24 1212   SpO2 99 % 06/06/24 1212   Vitals shown include unfiled device data.    Electronically Signed By: Van Mcdermott DO,   June 6, 2024  12:24 PM

## 2024-06-06 NOTE — ANESTHESIA CARE TRANSFER NOTE
Patient: Radha Morrison    Procedure: Procedure(s):  COLONOSCOPY       Diagnosis: History of colon polyps [Z86.010]  Diagnosis Additional Information: No value filed.    Anesthesia Type:   MAC     Note:    Oropharynx: oropharynx clear of all foreign objects  Level of Consciousness: awake  Oxygen Supplementation: nasal cannula  Level of Supplemental Oxygen (L/min / FiO2): 2  Independent Airway: airway patency satisfactory and stable  Dentition: dentition unchanged  Vital Signs Stable: post-procedure vital signs reviewed and stable  Report to RN Given: handoff report given  Patient transferred to: PACU    Handoff Report: Identifed the Patient, Identified the Reponsible Provider, Reviewed the pertinent medical history, Discussed the surgical course, Reviewed Intra-OP anesthesia mangement and issues during anesthesia, Set expectations for post-procedure period and Allowed opportunity for questions and acknowledgement of understanding      Electronically Signed By: JOSSUE Rosa CRNA  June 6, 2024  11:58 AM

## 2024-06-07 ENCOUNTER — TRANSFERRED RECORDS (OUTPATIENT)
Dept: HEALTH INFORMATION MANAGEMENT | Facility: CLINIC | Age: 64
End: 2024-06-07
Payer: COMMERCIAL

## 2024-06-20 ENCOUNTER — TRANSFERRED RECORDS (OUTPATIENT)
Dept: HEALTH INFORMATION MANAGEMENT | Facility: CLINIC | Age: 64
End: 2024-06-20
Payer: COMMERCIAL

## 2024-06-24 ENCOUNTER — TRANSFERRED RECORDS (OUTPATIENT)
Dept: HEALTH INFORMATION MANAGEMENT | Facility: CLINIC | Age: 64
End: 2024-06-24
Payer: COMMERCIAL

## 2024-07-02 ENCOUNTER — TRANSFERRED RECORDS (OUTPATIENT)
Dept: HEALTH INFORMATION MANAGEMENT | Facility: CLINIC | Age: 64
End: 2024-07-02
Payer: COMMERCIAL

## 2024-08-13 ENCOUNTER — TRANSFERRED RECORDS (OUTPATIENT)
Dept: HEALTH INFORMATION MANAGEMENT | Facility: CLINIC | Age: 64
End: 2024-08-13

## 2024-09-09 DIAGNOSIS — F51.01 PRIMARY INSOMNIA: ICD-10-CM

## 2024-09-09 DIAGNOSIS — E87.6 HYPOKALEMIA: ICD-10-CM

## 2024-09-09 DIAGNOSIS — R93.1 AGATSTON CORONARY ARTERY CALCIUM SCORE LESS THAN 100: ICD-10-CM

## 2024-09-09 DIAGNOSIS — R82.994 HYPERCALCIURIA: ICD-10-CM

## 2024-09-09 RX ORDER — POTASSIUM CHLORIDE 1500 MG/1
TABLET, EXTENDED RELEASE ORAL
Qty: 90 TABLET | Refills: 2 | Status: SHIPPED | OUTPATIENT
Start: 2024-09-09

## 2024-09-09 RX ORDER — EZETIMIBE 10 MG/1
10 TABLET ORAL DAILY
Qty: 90 TABLET | Refills: 2 | Status: SHIPPED | OUTPATIENT
Start: 2024-09-09

## 2024-09-09 RX ORDER — QUETIAPINE FUMARATE 100 MG/1
100 TABLET, FILM COATED ORAL AT BEDTIME
Qty: 90 TABLET | Refills: 3 | Status: SHIPPED | OUTPATIENT
Start: 2024-09-09

## 2024-09-09 RX ORDER — TRAZODONE HYDROCHLORIDE 100 MG/1
200 TABLET ORAL AT BEDTIME
Qty: 180 TABLET | Refills: 2 | Status: SHIPPED | OUTPATIENT
Start: 2024-09-09

## 2024-09-09 RX ORDER — SPIRONOLACTONE AND HYDROCHLOROTHIAZIDE 25; 25 MG/1; MG/1
1 TABLET ORAL DAILY
Qty: 90 TABLET | Refills: 3 | Status: SHIPPED | OUTPATIENT
Start: 2024-09-09

## 2024-09-09 NOTE — TELEPHONE ENCOUNTER
FAX [facility] Drug Refill Request      spironolactone-HCTZ (ALDACTAZIDE) 25-25 MG tablet   potassium chloride ER (KLOR-CON M) 20 MEQ CR tablet   traZODone (DESYREL) 100 MG tablet   QUEtiapine (SEROQUEL) 100 MG tablet   ezetimibe (ZETIA) 10 MG tablet

## 2024-09-23 ENCOUNTER — TRANSFERRED RECORDS (OUTPATIENT)
Dept: HEALTH INFORMATION MANAGEMENT | Facility: CLINIC | Age: 64
End: 2024-09-23
Payer: COMMERCIAL

## 2024-09-27 ENCOUNTER — OFFICE VISIT (OUTPATIENT)
Dept: INTERNAL MEDICINE | Facility: CLINIC | Age: 64
End: 2024-09-27
Payer: COMMERCIAL

## 2024-09-27 VITALS
HEART RATE: 75 BPM | WEIGHT: 121.6 LBS | RESPIRATION RATE: 16 BRPM | TEMPERATURE: 97.9 F | OXYGEN SATURATION: 98 % | HEIGHT: 66 IN | SYSTOLIC BLOOD PRESSURE: 110 MMHG | DIASTOLIC BLOOD PRESSURE: 68 MMHG | BODY MASS INDEX: 19.54 KG/M2

## 2024-09-27 DIAGNOSIS — M54.2 CERVICALGIA: ICD-10-CM

## 2024-09-27 DIAGNOSIS — M35.02 SJOGREN'S SYNDROME WITH LUNG INVOLVEMENT (H): ICD-10-CM

## 2024-09-27 DIAGNOSIS — R82.994 HYPERCALCIURIA: ICD-10-CM

## 2024-09-27 DIAGNOSIS — M15.0 PRIMARY OSTEOARTHRITIS INVOLVING MULTIPLE JOINTS: ICD-10-CM

## 2024-09-27 DIAGNOSIS — N95.2 ATROPHIC VAGINITIS: ICD-10-CM

## 2024-09-27 DIAGNOSIS — J84.89 NSIP (NONSPECIFIC INTERSTITIAL PNEUMONIA) (H): Chronic | ICD-10-CM

## 2024-09-27 DIAGNOSIS — K21.9 GASTROESOPHAGEAL REFLUX DISEASE WITHOUT ESOPHAGITIS: ICD-10-CM

## 2024-09-27 DIAGNOSIS — M81.8 OTHER OSTEOPOROSIS WITHOUT CURRENT PATHOLOGICAL FRACTURE: Chronic | ICD-10-CM

## 2024-09-27 DIAGNOSIS — R93.1 AGATSTON CORONARY ARTERY CALCIUM SCORE LESS THAN 100: Primary | ICD-10-CM

## 2024-09-27 DIAGNOSIS — F51.01 PRIMARY INSOMNIA: ICD-10-CM

## 2024-09-27 DIAGNOSIS — J30.2 SEASONAL ALLERGIC RHINITIS, UNSPECIFIED TRIGGER: ICD-10-CM

## 2024-09-27 LAB
ALBUMIN SERPL BCG-MCNC: 4.6 G/DL (ref 3.5–5.2)
ALP SERPL-CCNC: 79 U/L (ref 40–150)
ALT SERPL W P-5'-P-CCNC: 11 U/L (ref 0–50)
ANION GAP SERPL CALCULATED.3IONS-SCNC: 11 MMOL/L (ref 7–15)
AST SERPL W P-5'-P-CCNC: 25 U/L (ref 0–45)
BILIRUB SERPL-MCNC: 0.3 MG/DL
BUN SERPL-MCNC: 20.1 MG/DL (ref 8–23)
CALCIUM SERPL-MCNC: 10 MG/DL (ref 8.8–10.4)
CHLORIDE SERPL-SCNC: 102 MMOL/L (ref 98–107)
CHOLEST SERPL-MCNC: 183 MG/DL
CREAT SERPL-MCNC: 1 MG/DL (ref 0.51–0.95)
EGFRCR SERPLBLD CKD-EPI 2021: 63 ML/MIN/1.73M2
FASTING STATUS PATIENT QL REPORTED: YES
FASTING STATUS PATIENT QL REPORTED: YES
GLUCOSE SERPL-MCNC: 95 MG/DL (ref 70–99)
HCO3 SERPL-SCNC: 29 MMOL/L (ref 22–29)
HDLC SERPL-MCNC: 101 MG/DL
LDLC SERPL CALC-MCNC: 70 MG/DL
NONHDLC SERPL-MCNC: 82 MG/DL
POTASSIUM SERPL-SCNC: 3.8 MMOL/L (ref 3.4–5.3)
PROT SERPL-MCNC: 7.2 G/DL (ref 6.4–8.3)
SODIUM SERPL-SCNC: 142 MMOL/L (ref 135–145)
TRIGL SERPL-MCNC: 59 MG/DL

## 2024-09-27 PROCEDURE — 36415 COLL VENOUS BLD VENIPUNCTURE: CPT | Performed by: INTERNAL MEDICINE

## 2024-09-27 PROCEDURE — G2211 COMPLEX E/M VISIT ADD ON: HCPCS | Performed by: INTERNAL MEDICINE

## 2024-09-27 PROCEDURE — 80053 COMPREHEN METABOLIC PANEL: CPT | Performed by: INTERNAL MEDICINE

## 2024-09-27 PROCEDURE — 99214 OFFICE O/P EST MOD 30 MIN: CPT | Performed by: INTERNAL MEDICINE

## 2024-09-27 PROCEDURE — 80061 LIPID PANEL: CPT | Performed by: INTERNAL MEDICINE

## 2024-09-27 ASSESSMENT — PAIN SCALES - GENERAL: PAINLEVEL: NO PAIN (0)

## 2024-09-27 NOTE — ASSESSMENT & PLAN NOTE
Follows with Dr. Berry (pulm) and Dr. Arce (rheum), recent notes reviewed. HR CT chest stable 9/11/23.   - Stable on imuran and symbicort  - Next pulm f/up with repeat CT scheduled for next week

## 2024-09-27 NOTE — PROGRESS NOTES
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    -Continue methocarbamol 750 mg QID PRN            Respiratory    NSIP (nonspecific interstitial pneumonia) (H) (Chronic)     Follows with Dr. Berry (pulm) and Dr. Arce (rheum), recent notes reviewed. HR CT chest stable 9/11/23.   - Stable on imuran and symbicort  - Next pulm f/up with repeat CT scheduled for next week          Vasomotor Rhinitis     Allergic in nature. Well controlled with daily zyrtec.             Digestive    Gastroesophageal reflux disease without esophagitis     Well-controlled with as needed Pepcid 20 mg.            Endocrine    Hypercalciuria     Well controlled with spironolactone/HCTZ 25/25 mg daily and KCl 20 mEq daily.             Circulatory    Agatston coronary artery calcium score less than 100 - Primary     CAC score of 71 (9/19/22), unable to tolerate statin so started on Repatha. Zetia started 1/2023 as LDL was in the low 100s. TSH 2.03. Last LDL 10/2023 was 59.   - Continue repatha and zetia at current doses  - Repeat lipids due and ordered for today, LDL goal <70         Relevant Orders    Lipid panel reflex to direct LDL Fasting    Comprehensive metabolic panel       Musculoskeletal and Integumentary    Other osteoporosis without current pathological fracture (Chronic)     Patient's bone density has continued did respond very well to Prolia, last DEXA 3/2023 showed 2% increase in BMD in both her hips and spine.  With low fracture risk, patient to be class II \"to see if the gains \"made with Prolia.  Had first infusion 4/19/2024, tolerated well   - Plan to repeat DEXA 3/2024, will order at [facility]         Primary osteoarthritis involving multiple joints     Patient has significant OA of b/l CMC joints related to work as an organist. S/p L thumb joint " replacement 4/2024. Following closely at [place].             Immune    Sjogren's syndrome (H)     Follows with Dr. Arce from rheumatology, May visit note reviewed.  - Continue q6 month f/u with rheum  - Continue imuran 50 mg daily            Urinary    Atrophic vaginitis     Well-controlled with Estrace cream.            Other    Insomnia     Well-controlled with nightly trazodone 200 mg and Seroquel 100 mg.           The longitudinal plan of care for the diagnosis(es)/condition(s) as documented were addressed during this visit. Due to the added complexity in care, I will continue to support Radha in the subsequent management and with ongoing continuity of care.       FUTURE APPOINTMENTS:       - Follow-up for annual visit as scheduled in March or as needed      Subjective   Radha is a 64 year old, presenting for the following health issues:  Recheck Medication and Imm/Inj (When should she get the COVID vacc? Recently had COVID.)        9/27/2024     7:45 AM   Additional Questions   Roomed by KITTY Thomas   Accompanied by TOMAS     History of Present Illness     Reason for visit:  Medication check    Here for general follow up. Overall is doing well. Did have stress fracture of L foot diagnosed on Monday, wearing a boot. F/up in 3 weeks. Also started PT for L shoulder pain.    Osteoporosis: Last prolia 10/17/23. Last DEXA 3/2023 showed 2% increase in BMD in hips and spine. Insurance no longer wanted to cover Prolia, so we switched to Reclast, she had infusion 4/19/24.      ILD/NSIP: Had repeat HR CT 9/11/23, NSIP/fibrosis/volume loss stable. Last saw pulm (Dr. Berry) at [facility] 10/4/23. Stable with imuran 50 mg daily      COPD/restrictive lung disease: As above, saw Dr. Berry 10/4/23. Cont symbicort, trial aerobika flutter valve for intermittent wheezing.   - Has f/up next week with Dr. Berry     Sjogren's: Follows with Dr. Arce (rheum), sees every 6 months, last visit 5/29/24, stable CBC and CRP normal.  "On imuran 50 mg daily.      CAD: CAC score of 71 (9/19/22), unable to tolerate high dose statin so started on Repatha. Zetia added in 2023. LDL 59 10/3/23. Also taking ASA 81 mg daily.      Insomnia: Seroquel 100 mg daily, trazodone 200 mg at bedtime. This is pretty good.     HTN: spironolactone/hydrochlorothiazide 25/25 mg daily      Atrophic vaginitis: estrace cream working well      GERD: pepcid 20 mg daily, working well      Muscle tightness: Methocarbamol about the same effectiveness as flexeril.      Allergies: zyrtec 10 mg daily      Arthritis: L thumb surgery 4/10/24. Ganglion cyst removal 6/20/24. R thumb injection 7/4/24 and 9/23/24.     She eats 4 or more servings of fruits and vegetables daily.She consumes 2 sweetened beverage(s) daily.She exercises with enough effort to increase her heart rate 30 to 60 minutes per day.  She exercises with enough effort to increase her heart rate 6 days per week.     She is taking medications regularly.      Review of Systems  Constitutional, neuro, ENT, endocrine, pulmonary, cardiac, gastrointestinal, genitourinary, musculoskeletal, integument and psychiatric systems are negative, except as otherwise noted.      Objective    /68 (BP Location: Right arm, Patient Position: Sitting, Cuff Size: Adult Regular)   Pulse 75   Temp 97.9  F (36.6  C) (Oral)   Resp 16   Ht 1.676 m (5' 6\")   Wt 55.2 kg (121 lb 9.6 oz)   LMP  (LMP Unknown)   SpO2 98%   BMI 19.63 kg/m    Body mass index is 19.63 kg/m .  Physical Exam   GENERAL: alert and no distress  EYES: Eyes grossly normal to inspection and conjunctivae and sclerae normal  HENT: ear canals and TM's normal (L partially obscured by wax), nose and mouth without ulcers or lesions  NECK: no adenopathy, no asymmetry, masses, or scars  RESP: lungs clear to auscultation - no rales, rhonchi or wheezes  CV: regular rate and rhythm, normal S1 S2, no S3 or S4, no murmur, click or rub, no peripheral edema  ABDOMEN: soft, " nontender  MS: no gross musculoskeletal defects noted, no edema  SKIN: no suspicious lesions or rashes on exposed skin   NEURO: Normal strength and tone, mentation intact and speech normal  PSYCH: mentation appears normal, affect normal/bright    No results found for this or any previous visit (from the past 24 hour(s)).        Signed Electronically by: Deisi Marin MD

## 2024-09-27 NOTE — ASSESSMENT & PLAN NOTE
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 Following closely at [place].

## 2024-09-27 NOTE — ASSESSMENT & PLAN NOTE
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    -Continue methocarbamol 750 mg QID PRN

## 2024-09-27 NOTE — ASSESSMENT & PLAN NOTE
CAC score of 71 (9/19/22), unable to tolerate statin so started on Repatha. Zetia started 1/2023 as LDL was in the low 100s. TSH 2.03. Last LDL 10/2023 was 59.   - Continue repatha and zetia at current doses  - Repeat lipids due and ordered for today, LDL goal <70

## 2024-09-27 NOTE — ASSESSMENT & PLAN NOTE
"Patient's bone density has continued did respond very well to Prolia, last DEXA 3/2023 showed 2% increase in BMD in both her hips and spine.  With low fracture risk, patient to be class II \"to see if the gains \"made with Prolia.  Had first infusion 4/19/2024, tolerated well   - Plan to repeat DEXA 3/2024, will order at [facility]  "

## 2024-09-27 NOTE — ASSESSMENT & PLAN NOTE
Follows with Dr. Arce from rheumatology, May visit note reviewed.  - Continue q6 month f/u with rheum  - Continue imuran 50 mg daily

## 2024-10-14 ENCOUNTER — TRANSFERRED RECORDS (OUTPATIENT)
Dept: HEALTH INFORMATION MANAGEMENT | Facility: CLINIC | Age: 64
End: 2024-10-14
Payer: COMMERCIAL

## 2024-10-21 ENCOUNTER — TRANSFERRED RECORDS (OUTPATIENT)
Dept: HEALTH INFORMATION MANAGEMENT | Facility: CLINIC | Age: 64
End: 2024-10-21
Payer: COMMERCIAL

## 2024-10-29 ENCOUNTER — TRANSFERRED RECORDS (OUTPATIENT)
Dept: HEALTH INFORMATION MANAGEMENT | Facility: CLINIC | Age: 64
End: 2024-10-29
Payer: COMMERCIAL

## 2024-10-29 DIAGNOSIS — M54.2 CERVICALGIA: ICD-10-CM

## 2024-10-29 RX ORDER — METHOCARBAMOL 750 MG/1
750 TABLET, FILM COATED ORAL 4 TIMES DAILY PRN
Qty: 90 TABLET | Refills: 3 | Status: SHIPPED | OUTPATIENT
Start: 2024-10-29

## 2024-10-29 NOTE — TELEPHONE ENCOUNTER
Pharmacy calling to get a medication refill on medications attached     Disp Refills Start End SRINIVAS   methocarbamol (ROBAXIN) 750 MG tablet 90 tablet 3 3/25/2024 -- No   Sig - Route: Take 1 tablet (750 mg) by mouth 4 times daily as needed for muscle spasms - Oral   Sent to pharmacy as: Methocarbamol 750 MG Oral Tablet (ROBAXIN)   Class: E-Prescribe   Order: [id number]   E-Prescribing Status: Receipt confirmed by pharmacy (3/25/2024  9:03 AM CDT)

## 2024-11-11 ENCOUNTER — LAB REQUISITION (OUTPATIENT)
Dept: LAB | Facility: CLINIC | Age: 64
End: 2024-11-11

## 2024-11-11 DIAGNOSIS — Z12.4 ENCOUNTER FOR SCREENING FOR MALIGNANT NEOPLASM OF CERVIX: ICD-10-CM

## 2024-11-11 PROCEDURE — 87624 HPV HI-RISK TYP POOLED RSLT: CPT | Performed by: OBSTETRICS & GYNECOLOGY

## 2024-11-11 PROCEDURE — G0145 SCR C/V CYTO,THINLAYER,RESCR: HCPCS | Performed by: OBSTETRICS & GYNECOLOGY

## 2024-11-12 LAB
HPV HR 12 DNA CVX QL NAA+PROBE: NEGATIVE
HPV16 DNA CVX QL NAA+PROBE: NEGATIVE
HPV18 DNA CVX QL NAA+PROBE: NEGATIVE
HUMAN PAPILLOMA VIRUS FINAL DIAGNOSIS: NORMAL

## 2024-11-15 LAB
BKR AP ASSOCIATED HPV REPORT: NORMAL
BKR LAB AP GYN ADEQUACY: NORMAL
BKR LAB AP GYN INTERPRETATION: NORMAL
BKR LAB AP LMP: NORMAL
BKR LAB AP PREVIOUS ABNL DX: NORMAL
BKR LAB AP PREVIOUS ABNORMAL: NORMAL
PATH REPORT.COMMENTS IMP SPEC: NORMAL
PATH REPORT.COMMENTS IMP SPEC: NORMAL
PATH REPORT.RELEVANT HX SPEC: NORMAL

## 2024-12-03 ENCOUNTER — TRANSFERRED RECORDS (OUTPATIENT)
Dept: HEALTH INFORMATION MANAGEMENT | Facility: CLINIC | Age: 64
End: 2024-12-03
Payer: COMMERCIAL

## 2024-12-07 ASSESSMENT — ASTHMA QUESTIONNAIRES
QUESTION_2 LAST FOUR WEEKS HOW OFTEN HAVE YOU HAD SHORTNESS OF BREATH: NOT AT ALL
QUESTION_5 LAST FOUR WEEKS HOW WOULD YOU RATE YOUR ASTHMA CONTROL: COMPLETELY CONTROLLED
ACT_TOTALSCORE: 25
QUESTION_3 LAST FOUR WEEKS HOW OFTEN DID YOUR ASTHMA SYMPTOMS (WHEEZING, COUGHING, SHORTNESS OF BREATH, CHEST TIGHTNESS OR PAIN) WAKE YOU UP AT NIGHT OR EARLIER THAN USUAL IN THE MORNING: NOT AT ALL
ACT_TOTALSCORE: 25
QUESTION_4 LAST FOUR WEEKS HOW OFTEN HAVE YOU USED YOUR RESCUE INHALER OR NEBULIZER MEDICATION (SUCH AS ALBUTEROL): NOT AT ALL
QUESTION_1 LAST FOUR WEEKS HOW MUCH OF THE TIME DID YOUR ASTHMA KEEP YOU FROM GETTING AS MUCH DONE AT WORK, SCHOOL OR AT HOME: NONE OF THE TIME

## 2024-12-09 ENCOUNTER — TRANSFERRED RECORDS (OUTPATIENT)
Dept: HEALTH INFORMATION MANAGEMENT | Facility: CLINIC | Age: 64
End: 2024-12-09
Payer: COMMERCIAL

## 2024-12-12 ENCOUNTER — OFFICE VISIT (OUTPATIENT)
Dept: FAMILY MEDICINE | Facility: CLINIC | Age: 64
End: 2024-12-12
Payer: COMMERCIAL

## 2024-12-12 ENCOUNTER — LAB (OUTPATIENT)
Dept: LAB | Facility: CLINIC | Age: 64
End: 2024-12-12
Payer: COMMERCIAL

## 2024-12-12 VITALS
BODY MASS INDEX: 19.89 KG/M2 | OXYGEN SATURATION: 99 % | WEIGHT: 123.8 LBS | TEMPERATURE: 97.7 F | RESPIRATION RATE: 18 BRPM | SYSTOLIC BLOOD PRESSURE: 108 MMHG | HEART RATE: 77 BPM | HEIGHT: 66 IN | DIASTOLIC BLOOD PRESSURE: 67 MMHG

## 2024-12-12 DIAGNOSIS — M81.8 OTHER OSTEOPOROSIS WITHOUT CURRENT PATHOLOGICAL FRACTURE: Chronic | ICD-10-CM

## 2024-12-12 DIAGNOSIS — Z01.818 PREOP GENERAL PHYSICAL EXAM: Primary | ICD-10-CM

## 2024-12-12 DIAGNOSIS — M18.11 OSTEOARTHRITIS OF THUMB, RIGHT: ICD-10-CM

## 2024-12-12 DIAGNOSIS — K21.9 GASTROESOPHAGEAL REFLUX DISEASE WITHOUT ESOPHAGITIS: ICD-10-CM

## 2024-12-12 DIAGNOSIS — R93.1 AGATSTON CORONARY ARTERY CALCIUM SCORE LESS THAN 100: ICD-10-CM

## 2024-12-12 DIAGNOSIS — J84.89 NSIP (NONSPECIFIC INTERSTITIAL PNEUMONIA) (H): Chronic | ICD-10-CM

## 2024-12-12 DIAGNOSIS — D69.1 PLATELET DISORDER (H): Chronic | ICD-10-CM

## 2024-12-12 DIAGNOSIS — Z01.818 PREOP GENERAL PHYSICAL EXAM: ICD-10-CM

## 2024-12-12 LAB
ALBUMIN SERPL BCG-MCNC: 4.5 G/DL (ref 3.5–5.2)
ALP SERPL-CCNC: 85 U/L (ref 40–150)
ALT SERPL W P-5'-P-CCNC: 14 U/L (ref 0–50)
ANION GAP SERPL CALCULATED.3IONS-SCNC: 12 MMOL/L (ref 7–15)
AST SERPL W P-5'-P-CCNC: 27 U/L (ref 0–45)
ATRIAL RATE - MUSE: 62 BPM
BILIRUB SERPL-MCNC: 0.3 MG/DL
BUN SERPL-MCNC: 21.6 MG/DL (ref 8–23)
CALCIUM SERPL-MCNC: 10.1 MG/DL (ref 8.8–10.4)
CHLORIDE SERPL-SCNC: 100 MMOL/L (ref 98–107)
CREAT SERPL-MCNC: 0.97 MG/DL (ref 0.51–0.95)
DIASTOLIC BLOOD PRESSURE - MUSE: NORMAL MMHG
EGFRCR SERPLBLD CKD-EPI 2021: 65 ML/MIN/1.73M2
ERYTHROCYTE [DISTWIDTH] IN BLOOD BY AUTOMATED COUNT: 13 % (ref 10–15)
GLUCOSE SERPL-MCNC: 91 MG/DL (ref 70–99)
HCO3 SERPL-SCNC: 28 MMOL/L (ref 22–29)
HCT VFR BLD AUTO: 41.4 % (ref 35–47)
HGB BLD-MCNC: 13.3 G/DL (ref 11.7–15.7)
INTERPRETATION ECG - MUSE: NORMAL
MCH RBC QN AUTO: 29.9 PG (ref 26.5–33)
MCHC RBC AUTO-ENTMCNC: 32.1 G/DL (ref 31.5–36.5)
MCV RBC AUTO: 93 FL (ref 78–100)
P AXIS - MUSE: 68 DEGREES
PLATELET # BLD AUTO: 158 10E3/UL (ref 150–450)
POTASSIUM SERPL-SCNC: 4.6 MMOL/L (ref 3.4–5.3)
PR INTERVAL - MUSE: 208 MS
PROT SERPL-MCNC: 7 G/DL (ref 6.4–8.3)
QRS DURATION - MUSE: 86 MS
QT - MUSE: 418 MS
QTC - MUSE: 424 MS
R AXIS - MUSE: -40 DEGREES
RBC # BLD AUTO: 4.45 10E6/UL (ref 3.8–5.2)
SODIUM SERPL-SCNC: 140 MMOL/L (ref 135–145)
SYSTOLIC BLOOD PRESSURE - MUSE: NORMAL MMHG
T AXIS - MUSE: 29 DEGREES
VENTRICULAR RATE- MUSE: 62 BPM
WBC # BLD AUTO: 7.8 10E3/UL (ref 4–11)

## 2024-12-12 PROCEDURE — 93005 ELECTROCARDIOGRAM TRACING: CPT | Performed by: NURSE PRACTITIONER

## 2024-12-12 PROCEDURE — 99214 OFFICE O/P EST MOD 30 MIN: CPT | Performed by: NURSE PRACTITIONER

## 2024-12-12 RX ORDER — FLUTICASONE PROPIONATE 50 MCG
1 SPRAY, SUSPENSION (ML) NASAL DAILY
COMMUNITY

## 2024-12-12 ASSESSMENT — PAIN SCALES - GENERAL: PAINLEVEL_OUTOF10: MODERATE PAIN (4)

## 2024-12-12 NOTE — PATIENT INSTRUCTIONS
How to Take Your Medication Before Surgery  Preoperative Medication Instructions   Antiplatelet or Anticoagulation Medication Instructions   - aspirin: Discontinue aspirin 7-10 days prior to procedure to reduce bleeding risk. It should be resumed postoperatively.     Additional Medication Instructions  Take all scheduled medications on the day of surgery EXCEPT for modifications listed below:  Hold the morning of surgery: Spironolactone-hydrochlorothiazide, potassium, magnesium, calcium  Hold ibuprofen 24 hours before surgery   - Herbal medications and vitamins: DO NOT TAKE 14 days prior to surgery.        Last Reclast was late April    Patient Education   Preparing for Your Surgery  For Adults  Getting started  In most cases, a nurse will call to review your health history and instructions. They will give you an arrival time based on your scheduled surgery time. Please be ready to share:  Your doctor's clinic name and phone number  Your medical, surgical, and anesthesia history  A list of allergies and sensitivities  A list of medicines, including herbal treatments and over-the-counter drugs  Whether the patient has a legal guardian (ask how to send us the papers in advance)  Note: You may not receive a call if you were seen at our PAC (Preoperative Assessment Center).  Please tell us if you're pregnant--or if there's any chance you might be pregnant. Some surgeries may injure a fetus (unborn baby), so they require a pregnancy test. Surgeries that are safe for a fetus don't always need a test, and you can choose whether to have one.   Preparing for surgery  Within 10 to 30 days of surgery: Have a pre-op exam (sometimes called an H&P, or History and Physical). This can be done at a clinic or pre-operative center.  If you're having a , you may not need this exam. Talk to your care team.  At your pre-op exam, talk to your care team about all medicines you take. (This includes CBD oil and any drugs, such as  THC, marijuana, and other forms of cannabis.) If you need to stop any medicine before surgery, ask when to start taking it again.  This is for your safety. Many medicines and drugs can make you bleed too much during surgery. Some change how well surgery (anesthesia) drugs work.  Call your insurance company to let them know you're having surgery. (If you don't have insurance, call [phone].)  Call your clinic if there's any change in your health. This includes a scrape or scratch near the surgery site, or any signs of a cold (sore throat, runny nose, cough, rash, fever).  Eating and drinking guidelines  For your safety: Unless your surgeon tells you otherwise, follow the guidelines below.  Eat and drink as normal until 8 hours before you arrive for surgery. After that, no food or milk. You can spit out gum when you arrive.  Drink clear liquids until 2 hours before you arrive. These are liquids you can see through, like water, Gatorade, and Propel Water. They also include plain black coffee and tea (no cream or milk).  No alcohol for 24 hours before you arrive. The night before surgery, stop any drinks that contain THC.  If your care team tells you to take medicine on the morning of surgery, it's okay to take it with a sip of water. No other medicines or drugs are allowed (including CBD oil)--follow your care team's instructions.  If you have questions the day of surgery, call your hospital or surgery center.   Preventing infection  Shower or bathe the night before and the morning of surgery. Follow the instructions your clinic gave you. (If no instructions, use regular soap.)  Don't shave or clip hair near your surgery site. We'll remove the hair if needed.  Don't smoke or vape the morning of surgery. No chewing tobacco for 6 hours before you arrive. A nicotine patch is okay. You may spit out nicotine gum when you arrive.  For some surgeries, the surgeon will tell you to fully quit smoking and nicotine.  We will  make every effort to keep you safe from infection. We will:  Clean our hands often with soap and water (or an alcohol-based hand rub).  Clean the skin at your surgery site with a special soap that kills germs.  Give you a special gown to keep you warm. (Cold raises the risk of infection.)  Wear hair covers, masks, gowns, and gloves during surgery.  Give antibiotic medicine, if prescribed. Not all surgeries need this medicine.  What to bring on the day of surgery  Photo ID and insurance card  Copy of your health care directive, if you have one  Glasses and hearing aids (bring cases)  You can't wear contacts during surgery  Inhaler and eye drops, if you use them (tell us about these when you arrive)  CPAP machine or breathing device, if you use them  A few personal items, if spending the night  If you have . . .  A pacemaker, ICD (cardiac defibrillator), or other implant: Bring the ID card.  An implanted stimulator: Bring the remote control.  A legal guardian: Bring a copy of the certified (court-stamped) guardianship papers.  Please remove any jewelry, including body piercings. Leave jewelry and other valuables at home.  If you're going home the day of surgery  You must have a responsible adult drive you home. They should stay with you overnight as well.  If you don't have someone to stay with you, and you aren't safe to go home alone, we may keep you overnight. Insurance often won't pay for this.  After surgery  If it's hard to control your pain or you need more pain medicine, please call your surgeon's office.  Questions?   If you have any questions for your care team, list them here:   ____________________________________________________________________________________________________________________________________________________________________________________________________________________________________________________________  For informational purposes only. Not to replace the advice of your health care  provider. Copyright   2003, 2019 [facility]. All rights reserved. Clinically reviewed by Ty Allen MD. [employer] [id number] - REV 08/24.

## 2024-12-12 NOTE — PROGRESS NOTES
Preoperative Evaluation  [facility]  [address] [place] [address]  Phone: [phone]  Fax: [phone]  Primary Provider: Deisi Marin MD  Pre-op Performing Provider: JOSSUE Peters CNP  Dec 12, 2024             12/7/2024   Surgical Information   What procedure is being done? Right thumb joint replacement    Facility or Hospital where procedure/surgery will be performed: [facility]    Who is doing the procedure / surgery? Dr. Carson Bolivar    Date of surgery / procedure: 12/26/2024    Time of surgery / procedure: afternoon    Where do you plan to recover after surgery? at home with family        Patient-reported     Fax number for surgical facility: [phone]    Assessment & Plan     The proposed surgical procedure is considered Intermediate risk.    Preop general physical exam  - CBC with platelets  - Comprehensive metabolic panel  - EKG 12-lead, tracing only    Osteoarthritis of thumb, right  Symptomatic - prior tx included PT and steroid injections  Is scheduled to have Right thumb CMC arthroplasty 12/26/24    NSIP (nonspecific interstitial pneumonia) (H)  Follows with Pulmonology and rheumatology - Symptoms stable on Imuran and Symbicort  Last pulmonology visit 10/4/24- reviewed - pulmonary function test stable and   CT scan 10/4/24 remains stable    Sjogren's syndrome  Follows with rheumatology - every 6 moth follow up.   Taking Imuran 50mg daily    Other osteoporosis without current pathological fracture  Was being treated with prolia- last prolia 10/17/23. Last DEXA 3/2023 showed 2% increase in BMD in hips and spine. Given that insurance no longer covered prolia, was switched to Reclast, she had infusion 4/19/24.     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   Zetia was  started 1/2023 as LDL remained in the low 100's - TSH 1.39  On aspirin  Last LDL 70 on 9/27/24     Gastroesophageal reflux disease without esophagitis  Symptoms well controlled on Pepcid 20mg daily       - No identified additional risk factors other than previously addressed    Preoperative Medication Instructions  Antiplatelet or Anticoagulation Medication Instructions   - aspirin: Discontinue aspirin 7-10 days prior to procedure to reduce bleeding risk. It should be resumed postoperatively.     Additional Medication Instructions  Take all scheduled medications on the day of surgery EXCEPT for modifications listed below:  Hold the morning of surgery: Spironolactone-hydrochlorothiazide, potassium, magnesium, calcium  Hold ibuprofen 24 hours before surgery   - Herbal medications and vitamins: DO NOT TAKE 14 days prior to surgery.     Recommendation  Approval given to proceed with proposed procedure, without further diagnostic evaluation.    Felipa Garcia is a 64 year old, presenting for the following:  Pre-Op Exam (Pt here for preop exam for surgery at [facility] at the [facility], right hand thumb joint replacement on 12/26/24 with Dr Carson Bolivar.)          12/12/2024     2:17 PM   Additional Questions   Roomed by Adelina   Accompanied by self         12/12/2024     2:17 PM   Patient Reported Additional Medications   Patient reports taking the following new medications none     HPI related to upcoming procedure: Right thumb pain s/t R thumb CMC joint osteoarthritis. Scheduled to have thumb CMC arthroplasty 12/26/24.         12/7/2024   Pre-Op Questionnaire   Have you ever had a heart attack or stroke? No    Have you ever had surgery on your heart or blood vessels, such as a stent placement, a coronary artery bypass, or surgery on an artery in your head, neck, heart, or legs? No    Do you have chest pain with activity? No    Do you have a history of heart failure? No    Do you currently have a  cold, bronchitis or symptoms of other infection? No    Do you have a cough, shortness of breath, or wheezing? No    Do you or anyone in your family have previous history of blood clots? No    Do you or does anyone in your family have a serious bleeding problem such as prolonged bleeding following surgeries or cuts? No    Have you ever had problems with anemia or been told to take iron pills? No    Have you had any abnormal blood loss such as black, tarry or bloody stools, or abnormal vaginal bleeding? No    Have you ever had a blood transfusion? No    Are you willing to have a blood transfusion if it is medically needed before, during, or after your surgery? Yes    Have you or any of your relatives ever had problems with anesthesia? No    Do you have sleep apnea, excessive snoring or daytime drowsiness? No    Do you have any artifical heart valves or other implanted medical devices like a pacemaker, defibrillator, or continuous glucose monitor? No    Do you have artificial joints? No    Are you allergic to latex? No        Patient-reported     Health Care Directive  Patient does not have a Health Care Directive: Discussed advance care planning with patient; however, patient declined at this time.    Preoperative Review of    reviewed - no record of controlled substances prescribed.      Status of Chronic Conditions:  See problem list for active medical problems.  Problems all longstanding and stable, except as noted/documented.  See ROS for pertinent symptoms related to these conditions.    Patient Active Problem List    Diagnosis Date Noted    Primary osteoarthritis involving multiple joints 03/25/2024     Priority: Medium    Preop general physical exam 11/09/2023     Priority: Medium    Adenomatous polyp of ascending colon 10/03/2023     Priority: Medium    Routine general medical examination at a health care facility 03/27/2023     Priority: Medium    Gastroesophageal reflux disease without esophagitis  03/27/2023     Priority: Medium    Atrophic vaginitis 03/27/2023     Priority: Medium    Hypercalciuria 03/27/2023     Priority: Medium    Mild persistent asthma 06/09/2022     Priority: Medium    Screening for cervical cancer 03/09/2021     Priority: Medium     2015, 2017 NIL paps  2/26/21 NIL pap, neg HPV. On Prolia and Imuran. Cotest every 3 years        NSIP (nonspecific interstitial pneumonia) (H)      Priority: Medium      on VATS lung biopsy 7/2012 and has underlying connective tissue disorder,   followed by         Other osteoporosis without current pathological fracture      Priority: Medium     Prolia 1/2016 - 10/2023. Reclast x1 4/2024        Raynaud's Phenomenon      Priority: Medium     Created by Conversion        Agatston coronary artery calcium score less than 100 06/09/2017     Priority: Medium     Score of 7 in 2017        Vasomotor Rhinitis      Priority: Medium     Created by Conversion  Replacement Utility updated for latest IMO load        Insomnia 06/08/2016     Priority: Medium     Does not get into deep/restorative sleep without medications        Platelet disorder (H) 06/08/2016     Priority: Medium     EDTA resistant        Cervicalgia      Priority: Medium     Created by Conversion        Lumbar Radiculopathy      Priority: Medium     Created by Conversion        Sjogren's syndrome (H) 02/07/2014     Priority: Medium     Formatting of this note might be different from the original.  Has interstitial pneumonitis dry eyes/mouth, skin rash, Raynaud's, thrombocytopenia, burning tongue syndrome, abnormal serology including positive TOBY, SSA antibodies, rheumatoid factor, and IgM cardiolipin antibodies and she is followed by Dr. Arce from rheumatology currently on prednisone and Imuran        Past Medical History:   Diagnosis Date    Arthritis 6/1/1999    Cervicalgia     Heart disease 7/13/2021    Calcification on an aorta    Idiopathic thrombocytopenic purpura (H)      Interstitial lung disease (H)     Interstitial pulmonary disease (H)     Lumbar radiculopathy     Osteopenia     Raynaud phenomenon     Right shoulder pain     Sjoegren syndrome     Uncomplicated asthma 6/9/2022    Vasomotor rhinitis      Past Surgical History:   Procedure Laterality Date    BIOPSY  7/2012, 11/7/2022    surgical lung biopsy, uterine biopsy    COLONOSCOPY N/A 08/29/2023    Procedure: Colonoscopy WITH POLYPECTOMY;  Surgeon: Eda Clements MD;  Location: [facility] OR    COLONOSCOPY N/A 6/6/2024    Procedure: COLONOSCOPY;  Surgeon: Gregorio Kunz MD;  Location:  GI    EYE SURGERY  3/3/22    cataract    HC REPAIR OF NASAL SEPTUM      Description: Septoplasty;  Recorded: 07/12/2012;    IR CERVICAL EPIDURAL STEROID INJECTION  03/23/2009    ORTHOPEDIC SURGERY  7/2015, 12/29/21    shoulder, Guyan canal release    LA THORACOSCOPY W/DX BX OF LUNG INFILTRATE UNILATRL      Description: Thoracoscopy Of Lungs And Pleural Space With Biopsy Of Lung Infiltrates;  Recorded: 07/23/2012;     Current Outpatient Medications   Medication Sig Dispense Refill    aspirin 81 MG EC tablet Take 81 mg by mouth daily      azaTHIOprine (IMURAN) 50 mg tablet Take 1 tablet by mouth daily       calcium carbonate (OS-KOLE) 600 mg calcium (1,500 mg) tablet Take 600 mg by mouth 2 times daily (with meals)      cetirizine (ZYRTEC) 10 MG tablet Take 10 mg by mouth daily      estradiol (ESTRACE) 0.1 MG/GM vaginal cream INSERT 1 GRAM INTO THE VAGINA 1-3 TIMES PER WEEK. 42.5 g 3    evolocumab (REPATHA) 140 MG/ML prefilled autoinjector Inject 1 mL (140 mg) Subcutaneous every 14 days 6 mL 3    ezetimibe (ZETIA) 10 MG tablet Take 1 tablet (10 mg) by mouth daily. 90 tablet 2    famotidine (PEPCID) 20 MG tablet Take 20 mg by mouth At Bedtime      Flaxseed, Linseed, (FLAX SEED OIL) 1000 MG capsule Take 1 capsule by mouth daily For dry eyes       fluticasone (FLONASE) 50 MCG/ACT nasal spray Spray 1 spray into both nostrils daily.      magnesium  "chloride 535 (64 Mg) MG TBEC CR tablet Take 535 mg by mouth daily      methocarbamol (ROBAXIN) 750 MG tablet Take 1 tablet (750 mg) by mouth 4 times daily as needed for muscle spasms. 90 tablet 3    potassium chloride vasiliy ER (KLOR-CON M20) 20 MEQ CR tablet TAKE 1 TABLET (20 MEQ TOTAL) BY MOUTH DAILY. Strength: 20 mEq 90 tablet 2    QUEtiapine (SEROQUEL) 100 MG tablet Take 1 tablet (100 mg) by mouth at bedtime. 90 tablet 3    spironolactone-HCTZ (ALDACTAZIDE) 25-25 MG tablet Take 1 tablet by mouth daily. 90 tablet 3    SYMBICORT 80-4.5 MCG/ACT Inhaler INHALE 2 PUFFS BY MOUTH 2 TIMES DAILY.      traZODone (DESYREL) 100 MG tablet Take 2 tablets (200 mg) by mouth at bedtime. 180 tablet 2    tretinoin (RETIN-A) 0.025 % external cream APPLY TOPICALLY TO AFFECTED AREA OF FACE AT BEDTIME      Zoledronic Acid (RECLAST IV)          Allergies   Allergen Reactions    Crestor [Rosuvastatin] Cramps     Tolerating 20mg dose         Social History     Tobacco Use    Smoking status: Never     Passive exposure: Never    Smokeless tobacco: Never   Substance Use Topics    Alcohol use: Yes     Comment: 4 drinks per week       History   Drug Use No               Objective    /67 (BP Location: Left arm, Patient Position: Sitting, Cuff Size: Adult Regular)   Pulse 77   Temp 97.7  F (36.5  C) (Oral)   Resp 18   Ht 1.676 m (5' 6\")   Wt 56.2 kg (123 lb 12.8 oz)   LMP  (LMP Unknown)   SpO2 99%   Breastfeeding No   BMI 19.98 kg/m     Estimated body mass index is 19.98 kg/m  as calculated from the following:    Height as of this encounter: 1.676 m (5' 6\").    Weight as of this encounter: 56.2 kg (123 lb 12.8 oz).  Physical Exam  GENERAL: alert and no distress  EYES: Eyes grossly normal to inspection, PERRL and conjunctivae and sclerae normal  HENT: ear canals and TM's normal, nose and mouth without ulcers or lesions  NECK: no adenopathy, no asymmetry, masses, or scars  RESP: lungs clear to auscultation - no rales, rhonchi or " wheezes  CV: regular rate and rhythm, normal S1 S2, no S3 or S4, no murmur, click or rub, no peripheral edema  ABDOMEN: soft, nontender, no hepatosplenomegaly, no masses and bowel sounds normal  MS: no gross musculoskeletal defects noted, no edema  SKIN: no suspicious lesions or rashes  NEURO: Normal strength and tone, mentation intact and speech normal  PSYCH: mentation appears normal, affect normal/bright    Recent Labs   Lab Test 09/27/24  0838 05/20/24  1124 03/25/24  0914   HGB  --  13.6 14.5   PLT  --  108* 144*    140 139   POTASSIUM 3.8 4.1 4.4   CR 1.00* 0.92 0.98*   A1C  --   --  5.5        Diagnostics  Labs pending at this time.  Results will be reviewed when available.   EKG:    Sinus rhythm   Possible Left atrial enlargement   Left axis deviation   Anteroseptal infarct (cited on or before 08-Nov-2023)   Abnormal ECG   When compared with ECG of 20-May-2024 10:00,   No significant change was found   Confirmed by JOSÉ LUIS JIMENEZ MD LOC:OTIS ([id number]) on 12/13/2024 8:18:21 AM     Revised Cardiac Risk Index (RCRI)  The patient has the following serious cardiovascular risks for perioperative complications:   - No serious cardiac risks = 0 points     RCRI Interpretation: 0 points: Class I (very low risk - 0.4% complication rate)         Signed Electronically by: JOSSUE Peters CNP  A copy of this evaluation report is provided to the requesting physician.

## 2024-12-13 LAB
ATRIAL RATE - MUSE: 62 BPM
DIASTOLIC BLOOD PRESSURE - MUSE: NORMAL MMHG
INTERPRETATION ECG - MUSE: NORMAL
P AXIS - MUSE: 68 DEGREES
PR INTERVAL - MUSE: 208 MS
QRS DURATION - MUSE: 86 MS
QT - MUSE: 418 MS
QTC - MUSE: 424 MS
R AXIS - MUSE: -40 DEGREES
SYSTOLIC BLOOD PRESSURE - MUSE: NORMAL MMHG
T AXIS - MUSE: 29 DEGREES
VENTRICULAR RATE- MUSE: 62 BPM

## 2024-12-13 PROCEDURE — 93010 ELECTROCARDIOGRAM REPORT: CPT | Performed by: STUDENT IN AN ORGANIZED HEALTH CARE EDUCATION/TRAINING PROGRAM

## 2024-12-16 ENCOUNTER — TELEPHONE (OUTPATIENT)
Dept: FAMILY MEDICINE | Facility: CLINIC | Age: 64
End: 2024-12-16
Payer: COMMERCIAL

## 2024-12-16 NOTE — TELEPHONE ENCOUNTER
December 16, 2024    Patient's pre-operative physical documentation and labs have been completed by Jammie Tay DNP.  The pre-operative paperwork was faxed to [facility] at [phone] per instructions from the surgeon.    Judith Vasquez

## 2024-12-17 ENCOUNTER — TRANSFERRED RECORDS (OUTPATIENT)
Dept: HEALTH INFORMATION MANAGEMENT | Facility: CLINIC | Age: 64
End: 2024-12-17
Payer: COMMERCIAL

## 2024-12-23 ENCOUNTER — DOCUMENTATION ONLY (OUTPATIENT)
Dept: OTHER | Facility: CLINIC | Age: 64
End: 2024-12-23
Payer: COMMERCIAL

## 2024-12-23 DIAGNOSIS — N95.2 ATROPHIC VAGINITIS: ICD-10-CM

## 2024-12-23 RX ORDER — ESTRADIOL 0.1 MG/G
CREAM VAGINAL
Qty: 42.5 G | Refills: 3 | Status: SHIPPED | OUTPATIENT
Start: 2024-12-23

## 2025-01-19 ENCOUNTER — HEALTH MAINTENANCE LETTER (OUTPATIENT)
Age: 65
End: 2025-01-19

## 2025-01-21 ENCOUNTER — MYC MEDICAL ADVICE (OUTPATIENT)
Dept: INTERNAL MEDICINE | Facility: CLINIC | Age: 65
End: 2025-01-21
Payer: COMMERCIAL

## 2025-01-22 ENCOUNTER — TELEPHONE (OUTPATIENT)
Dept: INTERNAL MEDICINE | Facility: CLINIC | Age: 65
End: 2025-01-22
Payer: COMMERCIAL

## 2025-01-23 ENCOUNTER — TRANSFERRED RECORDS (OUTPATIENT)
Dept: HEALTH INFORMATION MANAGEMENT | Facility: CLINIC | Age: 65
End: 2025-01-23
Payer: COMMERCIAL

## 2025-01-27 NOTE — TELEPHONE ENCOUNTER
Pt calling to check status of this as pt is going out of town on Thursday - writer advised that this is handled with our PA team and it can take a while to get a response back - writer broke down how the process works and once an answer is given we then will reach out to pt - pt is not happy about this and requested to complain to patient relations as she was not aware of this process or time frame.

## 2025-01-27 NOTE — TELEPHONE ENCOUNTER
Central Prior Authorization Team   Phone: [phone]    PA Initiation    Medication: Repatha 140mg/ml  Insurance Company: [employer] Non-Specialty PA's - Phone [phone] Fax [phone]  Pharmacy Filling the Rx: [id number] - [place], [place] - [address] [place] [address] [address]  Filling Pharmacy Phone: [phone]  Filling Pharmacy Fax:    Start Date: 1/27/2025

## 2025-01-27 NOTE — TELEPHONE ENCOUNTER
Prior Authorization Approval    Authorization Effective Date: 12/28/2024  Authorization Expiration Date: 1/27/2026  Medication: Repatha 140mg/ml  Approved Dose/Quantity:   Reference #:     Insurance Company: [employer] Non-Specialty PA's - Phone [phone] Fax [phone]  Expected CoPay:       CoPay Card Available:      Foundation Assistance Needed:    Which Pharmacy is filling the prescription (Not needed for infusion/clinic administered): [id number] - [place], [place] - [address] [place] [address]  Pharmacy Notified:  yes  Patient Notified:  yes- Pharmacy will contact patient when ready to /ship

## 2025-02-19 ENCOUNTER — TRANSFERRED RECORDS (OUTPATIENT)
Dept: HEALTH INFORMATION MANAGEMENT | Facility: CLINIC | Age: 65
End: 2025-02-19
Payer: COMMERCIAL

## 2025-03-07 ENCOUNTER — TRANSFERRED RECORDS (OUTPATIENT)
Dept: HEALTH INFORMATION MANAGEMENT | Facility: CLINIC | Age: 65
End: 2025-03-07
Payer: COMMERCIAL

## 2025-03-10 ENCOUNTER — TRANSFERRED RECORDS (OUTPATIENT)
Dept: HEALTH INFORMATION MANAGEMENT | Facility: CLINIC | Age: 65
End: 2025-03-10
Payer: COMMERCIAL

## 2025-03-11 ENCOUNTER — LAB (OUTPATIENT)
Dept: LAB | Facility: CLINIC | Age: 65
End: 2025-03-11
Payer: COMMERCIAL

## 2025-03-11 DIAGNOSIS — H30.141: Primary | ICD-10-CM

## 2025-03-11 LAB — T PALLIDUM AB SER QL: NONREACTIVE

## 2025-03-11 PROCEDURE — 86780 TREPONEMA PALLIDUM: CPT

## 2025-03-11 PROCEDURE — 36415 COLL VENOUS BLD VENIPUNCTURE: CPT

## 2025-03-11 PROCEDURE — 82164 ANGIOTENSIN I ENZYME TEST: CPT | Mod: 90

## 2025-03-11 PROCEDURE — 99000 SPECIMEN HANDLING OFFICE-LAB: CPT

## 2025-03-11 PROCEDURE — 81381 HLA I TYPING 1 ALLELE HR: CPT

## 2025-03-14 LAB — ACE SERPL-CCNC: 19 U/L

## 2025-03-17 ENCOUNTER — TRANSFERRED RECORDS (OUTPATIENT)
Dept: HEALTH INFORMATION MANAGEMENT | Facility: CLINIC | Age: 65
End: 2025-03-17
Payer: COMMERCIAL

## 2025-03-18 LAB
HLA TYPE SA METHOD: NORMAL
HLA TYPE SA RESULT: NORMAL

## 2025-03-21 SDOH — HEALTH STABILITY: PHYSICAL HEALTH: ON AVERAGE, HOW MANY MINUTES DO YOU ENGAGE IN EXERCISE AT THIS LEVEL?: 30 MIN

## 2025-03-21 SDOH — HEALTH STABILITY: PHYSICAL HEALTH: ON AVERAGE, HOW MANY DAYS PER WEEK DO YOU ENGAGE IN MODERATE TO STRENUOUS EXERCISE (LIKE A BRISK WALK)?: 6 DAYS

## 2025-03-21 ASSESSMENT — SOCIAL DETERMINANTS OF HEALTH (SDOH): HOW OFTEN DO YOU GET TOGETHER WITH FRIENDS OR RELATIVES?: TWICE A WEEK

## 2025-03-26 ENCOUNTER — OFFICE VISIT (OUTPATIENT)
Dept: INTERNAL MEDICINE | Facility: CLINIC | Age: 65
End: 2025-03-26
Attending: INTERNAL MEDICINE
Payer: COMMERCIAL

## 2025-03-26 VITALS
BODY MASS INDEX: 19.72 KG/M2 | HEIGHT: 66 IN | HEART RATE: 75 BPM | RESPIRATION RATE: 16 BRPM | SYSTOLIC BLOOD PRESSURE: 111 MMHG | OXYGEN SATURATION: 97 % | DIASTOLIC BLOOD PRESSURE: 70 MMHG | WEIGHT: 122.7 LBS | TEMPERATURE: 97.7 F

## 2025-03-26 DIAGNOSIS — D69.1 PLATELET DISORDER (H): Chronic | ICD-10-CM

## 2025-03-26 DIAGNOSIS — J84.89 NSIP (NONSPECIFIC INTERSTITIAL PNEUMONIA) (H): Chronic | ICD-10-CM

## 2025-03-26 DIAGNOSIS — J30.2 SEASONAL ALLERGIC RHINITIS, UNSPECIFIED TRIGGER: ICD-10-CM

## 2025-03-26 DIAGNOSIS — H30.91: ICD-10-CM

## 2025-03-26 DIAGNOSIS — Z78.9 STATIN INTOLERANCE: ICD-10-CM

## 2025-03-26 DIAGNOSIS — E87.6 HYPOKALEMIA: ICD-10-CM

## 2025-03-26 DIAGNOSIS — R82.994 HYPERCALCIURIA: ICD-10-CM

## 2025-03-26 DIAGNOSIS — N95.2 ATROPHIC VAGINITIS: ICD-10-CM

## 2025-03-26 DIAGNOSIS — M15.0 PRIMARY OSTEOARTHRITIS INVOLVING MULTIPLE JOINTS: ICD-10-CM

## 2025-03-26 DIAGNOSIS — M81.8 OTHER OSTEOPOROSIS WITHOUT CURRENT PATHOLOGICAL FRACTURE: Chronic | ICD-10-CM

## 2025-03-26 DIAGNOSIS — F51.01 PRIMARY INSOMNIA: ICD-10-CM

## 2025-03-26 DIAGNOSIS — K21.9 GASTROESOPHAGEAL REFLUX DISEASE WITHOUT ESOPHAGITIS: ICD-10-CM

## 2025-03-26 DIAGNOSIS — I25.10 ATHEROSCLEROSIS OF NATIVE CORONARY ARTERY OF NATIVE HEART WITHOUT ANGINA PECTORIS: ICD-10-CM

## 2025-03-26 DIAGNOSIS — R93.1 AGATSTON CORONARY ARTERY CALCIUM SCORE LESS THAN 100: ICD-10-CM

## 2025-03-26 DIAGNOSIS — M35.02 SJOGREN'S SYNDROME WITH LUNG INVOLVEMENT (H): ICD-10-CM

## 2025-03-26 DIAGNOSIS — Z00.00 ROUTINE GENERAL MEDICAL EXAMINATION AT A HEALTH CARE FACILITY: Primary | ICD-10-CM

## 2025-03-26 PROBLEM — J45.30 MILD PERSISTENT ASTHMA: Status: RESOLVED | Noted: 2022-06-09 | Resolved: 2025-03-26

## 2025-03-26 PROBLEM — Z01.818 PREOP GENERAL PHYSICAL EXAM: Status: RESOLVED | Noted: 2023-11-09 | Resolved: 2025-03-26

## 2025-03-26 LAB
ALBUMIN SERPL BCG-MCNC: 4.5 G/DL (ref 3.5–5.2)
ALP SERPL-CCNC: 79 U/L (ref 40–150)
ALT SERPL W P-5'-P-CCNC: 12 U/L (ref 0–50)
ANION GAP SERPL CALCULATED.3IONS-SCNC: 12 MMOL/L (ref 7–15)
AST SERPL W P-5'-P-CCNC: 28 U/L (ref 0–45)
BILIRUB SERPL-MCNC: 0.3 MG/DL
BUN SERPL-MCNC: 19.9 MG/DL (ref 8–23)
CALCIUM SERPL-MCNC: 9.9 MG/DL (ref 8.8–10.4)
CHLORIDE SERPL-SCNC: 102 MMOL/L (ref 98–107)
CREAT SERPL-MCNC: 1 MG/DL (ref 0.51–0.95)
EGFRCR SERPLBLD CKD-EPI 2021: 63 ML/MIN/1.73M2
ERYTHROCYTE [DISTWIDTH] IN BLOOD BY AUTOMATED COUNT: 12.7 % (ref 10–15)
EST. AVERAGE GLUCOSE BLD GHB EST-MCNC: 105 MG/DL
GLUCOSE SERPL-MCNC: 89 MG/DL (ref 70–99)
HBA1C MFR BLD: 5.3 % (ref 0–5.6)
HCO3 SERPL-SCNC: 29 MMOL/L (ref 22–29)
HCT VFR BLD AUTO: 44.7 % (ref 35–47)
HGB BLD-MCNC: 14.6 G/DL (ref 11.7–15.7)
MCH RBC QN AUTO: 30.2 PG (ref 26.5–33)
MCHC RBC AUTO-ENTMCNC: 32.7 G/DL (ref 31.5–36.5)
MCV RBC AUTO: 92 FL (ref 78–100)
PLATELET # BLD AUTO: 188 10E3/UL (ref 150–450)
POTASSIUM SERPL-SCNC: 4 MMOL/L (ref 3.4–5.3)
PROT SERPL-MCNC: 7 G/DL (ref 6.4–8.3)
RBC # BLD AUTO: 4.84 10E6/UL (ref 3.8–5.2)
SODIUM SERPL-SCNC: 143 MMOL/L (ref 135–145)
TSH SERPL DL<=0.005 MIU/L-ACNC: 1.68 UIU/ML (ref 0.3–4.2)
VIT D+METAB SERPL-MCNC: 45 NG/ML (ref 20–50)
WBC # BLD AUTO: 6 10E3/UL (ref 4–11)

## 2025-03-26 PROCEDURE — 80053 COMPREHEN METABOLIC PANEL: CPT | Performed by: INTERNAL MEDICINE

## 2025-03-26 PROCEDURE — G2211 COMPLEX E/M VISIT ADD ON: HCPCS | Performed by: INTERNAL MEDICINE

## 2025-03-26 PROCEDURE — 99214 OFFICE O/P EST MOD 30 MIN: CPT | Mod: 25 | Performed by: INTERNAL MEDICINE

## 2025-03-26 PROCEDURE — 85027 COMPLETE CBC AUTOMATED: CPT | Performed by: INTERNAL MEDICINE

## 2025-03-26 PROCEDURE — 83036 HEMOGLOBIN GLYCOSYLATED A1C: CPT | Performed by: INTERNAL MEDICINE

## 2025-03-26 PROCEDURE — 84443 ASSAY THYROID STIM HORMONE: CPT | Performed by: INTERNAL MEDICINE

## 2025-03-26 PROCEDURE — 3078F DIAST BP <80 MM HG: CPT | Performed by: INTERNAL MEDICINE

## 2025-03-26 PROCEDURE — 99396 PREV VISIT EST AGE 40-64: CPT | Performed by: INTERNAL MEDICINE

## 2025-03-26 PROCEDURE — 36415 COLL VENOUS BLD VENIPUNCTURE: CPT | Performed by: INTERNAL MEDICINE

## 2025-03-26 PROCEDURE — 82306 VITAMIN D 25 HYDROXY: CPT | Performed by: INTERNAL MEDICINE

## 2025-03-26 PROCEDURE — 3074F SYST BP LT 130 MM HG: CPT | Performed by: INTERNAL MEDICINE

## 2025-03-26 RX ORDER — QUETIAPINE FUMARATE 100 MG/1
100 TABLET, FILM COATED ORAL AT BEDTIME
Qty: 90 TABLET | Refills: 3 | Status: SHIPPED | OUTPATIENT
Start: 2025-03-26

## 2025-03-26 RX ORDER — SPIRONOLACTONE AND HYDROCHLOROTHIAZIDE 25; 25 MG/1; MG/1
1 TABLET ORAL DAILY
Qty: 90 TABLET | Refills: 3 | Status: SHIPPED | OUTPATIENT
Start: 2025-03-26

## 2025-03-26 RX ORDER — POTASSIUM CHLORIDE 1500 MG/1
TABLET, EXTENDED RELEASE ORAL
Qty: 90 TABLET | Refills: 2 | Status: SHIPPED | OUTPATIENT
Start: 2025-03-26

## 2025-03-26 RX ORDER — PREDNISOLONE ACETATE 10 MG/ML
SUSPENSION/ DROPS OPHTHALMIC
COMMUNITY
Start: 2025-03-10

## 2025-03-26 RX ORDER — EZETIMIBE 10 MG/1
10 TABLET ORAL DAILY
Qty: 90 TABLET | Refills: 3 | Status: SHIPPED | OUTPATIENT
Start: 2025-03-26

## 2025-03-26 RX ORDER — AZELAIC ACID 0.15 G/G
GEL TOPICAL
COMMUNITY
Start: 2024-12-06

## 2025-03-26 RX ORDER — TRAZODONE HYDROCHLORIDE 100 MG/1
200 TABLET ORAL AT BEDTIME
Qty: 180 TABLET | Refills: 2 | Status: SHIPPED | OUTPATIENT
Start: 2025-03-26

## 2025-03-26 RX ORDER — ESTRADIOL 0.1 MG/G
CREAM VAGINAL
Qty: 42.5 G | Refills: 3 | Status: SHIPPED | OUTPATIENT
Start: 2025-03-26

## 2025-03-26 RX ORDER — NAPROXEN SODIUM 220 MG/1
TABLET, FILM COATED ORAL PRN
COMMUNITY
Start: 2024-01-05

## 2025-03-26 NOTE — PATIENT INSTRUCTIONS
Patient Education   Preventive Care Advice   This is general advice given by our system to help you stay healthy. However, your care team may have specific advice just for you. Please talk to your care team about your preventive care needs.  Nutrition  Eat 5 or more servings of fruits and vegetables each day.  Try wheat bread, brown rice and whole grain pasta (instead of white bread, rice, and pasta).  Get enough calcium and vitamin D. Check the label on foods and aim for 100% of the RDA (recommended daily allowance).  Lifestyle  Exercise at least 150 minutes each week  (30 minutes a day, 5 days a week).  Do muscle strengthening activities 2 days a week. These help control your weight and prevent disease.  No smoking.  Wear sunscreen to prevent skin cancer.  Have a dental exam and cleaning every 6 months.  Yearly exams  See your health care team every year to talk about:  Any changes in your health.  Any medicines your care team has prescribed.  Preventive care, family planning, and ways to prevent chronic diseases.  Shots (vaccines)   HPV shots (up to age 26), if you've never had them before.  Hepatitis B shots (up to age 59), if you've never had them before.  COVID-19 shot: Get this shot when it's due.  Flu shot: Get a flu shot every year.  Tetanus shot: Get a tetanus shot every 10 years.  Pneumococcal, hepatitis A, and RSV shots: Ask your care team if you need these based on your risk.  Shingles shot (for age 50 and up)  General health tests  Diabetes screening:  Starting at age 35, Get screened for diabetes at least every 3 years.  If you are younger than age 35, ask your care team if you should be screened for diabetes.  Cholesterol test: At age 39, start having a cholesterol test every 5 years, or more often if advised.  Bone density scan (DEXA): At age 50, ask your care team if you should have this scan for osteoporosis (brittle bones).  Hepatitis C: Get tested at least once in your life.  STIs (sexually  transmitted infections)  Before age 24: Ask your care team if you should be screened for STIs.  After age 24: Get screened for STIs if you're at risk. You are at risk for STIs (including HIV) if:  You are sexually active with more than one person.  You don't use condoms every time.  You or a partner was diagnosed with a sexually transmitted infection.  If you are at risk for HIV, ask about PrEP medicine to prevent HIV.  Get tested for HIV at least once in your life, whether you are at risk for HIV or not.  Cancer screening tests  Cervical cancer screening: If you have a cervix, begin getting regular cervical cancer screening tests starting at age 21.  Breast cancer scan (mammogram): If you've ever had breasts, begin having regular mammograms starting at age 40. This is a scan to check for breast cancer.  Colon cancer screening: It is important to start screening for colon cancer at age 45.  Have a colonoscopy test every 10 years (or more often if you're at risk) Or, ask your provider about stool tests like a FIT test every year or Cologuard test every 3 years.  To learn more about your testing options, visit:   .  For help making a decision, visit:   https://bit.ly/ou56855.  Prostate cancer screening test: If you have a prostate, ask your care team if a prostate cancer screening test (PSA) at age 55 is right for you.  Lung cancer screening: If you are a current or former smoker ages 50 to 80, ask your care team if ongoing lung cancer screenings are right for you.  For informational purposes only. Not to replace the advice of your health care provider. Copyright   2023 [facility]. All rights reserved. Clinically reviewed by the [facility] Transitions Program. Aviary 718245 - REV 01/24.  Preventing Falls: Care Instructions  Injuries and health problems such as trouble walking or poor eyesight can increase your risk of falling. So can some medicines. But there are things you can do to help  "prevent falls. You can exercise to get stronger. You can also arrange your home to make it safer.    Talk to your doctor about the medicines you take. Ask if any of them increase the risk of falls and whether they can be changed or stopped.   Try to exercise regularly. It can help improve your strength and balance. This can help lower your risk of falling.         Practice fall safety and prevention.   Wear low-heeled shoes that fit well and give your feet good support. Talk to your doctor if you have foot problems that make this hard.  Carry a cellphone or wear a medical alert device that you can use to call for help.  Use stepladders instead of chairs to reach high objects. Don't climb if you're at risk for falls. Ask for help, if needed.  Wear the correct eyeglasses, if you need them.        Make your home safer.   Remove rugs, cords, clutter, and furniture from walkways.  Keep your house well lit. Use night-lights in hallways and bathrooms.  Install and use sturdy handrails on stairways.  Wear nonskid footwear, even inside. Don't walk barefoot or in socks without shoes.        Be safe outside.   Use handrails, curb cuts, and ramps whenever possible.  Keep your hands free by using a shoulder bag or backpack.  Try to walk in well-lit areas. Watch out for uneven ground, changes in pavement, and debris.  Be careful in the winter. Walk on the grass or gravel when sidewalks are slippery. Use de-icer on steps and walkways. Add non-slip devices to shoes.    Put grab bars and nonskid mats in your shower or tub and near the toilet. Try to use a shower chair or bath bench when bathing.   Get into a tub or shower by putting in your weaker leg first. Get out with your strong side first. Have a phone or medical alert device in the bathroom with you.   Where can you learn more?  Go to https://www.Accountablewise.net/patiented  Enter G117 in the search box to learn more about \"Preventing Falls: Care Instructions.\"  Current as of: " July 31, 2024  Content Version: 14.4    8174-0298 [employer].   Care instructions adapted under license by your healthcare professional. If you have questions about a medical condition or this instruction, always ask your healthcare professional. [employer] disclaims any warranty or liability for your use of this information.

## 2025-03-26 NOTE — ASSESSMENT & PLAN NOTE
New dx in last year with vision changes in R eye. Following with retinal specialist and rheumatologist.

## 2025-03-26 NOTE — ASSESSMENT & PLAN NOTE
Follows with Dr. Berry (pulm) and Dr. Arce (rheum), recent notes reviewed. HR CT chest stable 10/2024.   - Stable on imuran and symbicort

## 2025-03-26 NOTE — ASSESSMENT & PLAN NOTE
2/2 hydrochlorothiazide. Stable on 20 mEq KCl.   - Repeat CMP today  - Continue current K replacement

## 2025-03-26 NOTE — ASSESSMENT & PLAN NOTE
Follows with Dr. Arce from rheumatology.   - Continue q6 month f/u with rheum  - Continue imuran 50 mg daily

## 2025-03-26 NOTE — ASSESSMENT & PLAN NOTE
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 S/p L thumb joint replacement 4/2024 and R 12/2024.   - Follows closely with [facility]

## 2025-03-26 NOTE — ASSESSMENT & PLAN NOTE
CAC score of 71 (9/19/22), unable to tolerate statin so started on Repatha. Zetia started 1/2023 as LDL was in the low 100s with improvement in LDL. Last 70 (9/2024)  - Continue repatha and zetia at current doses  - Continue daily baby ASA  - Repeat lipids due and ordered for today, LDL goal <70

## 2025-03-26 NOTE — PROGRESS NOTES
Preventive Care Visit  [facility] Yuliana Marin MD, Internal Medicine  Mar 26, 2025      Assessment & Plan   Problem List Items Addressed This Visit          Respiratory    NSIP (nonspecific interstitial pneumonia) (H) (Chronic)     Follows with Dr. Berry (pulm) and Dr. Arce (rheum), recent notes reviewed. HR CT chest stable 10/2024.   - Stable on imuran and symbicort         Vasomotor Rhinitis     Allergic in nature. Well controlled with daily zyrtec.             Digestive    Gastroesophageal reflux disease without esophagitis     Well-controlled with as needed Pepcid 20 mg. She buys OTC.            Endocrine    Hypercalciuria     Well controlled with spironolactone/HCTZ 25/25 mg daily and KCl 20 mEq daily.          Relevant Medications    potassium chloride vasiliy ER (KLOR-CON M20) 20 MEQ CR tablet    Hypokalemia     2/2 hydrochlorothiazide. Stable on 20 mEq KCl.   - Repeat CMP today  - Continue current K replacement          Relevant Medications    spironolactone-HCTZ (ALDACTAZIDE) 25-25 MG tablet       Circulatory    Agatston coronary artery calcium score less than 100     CAC score of 71 (9/19/22), unable to tolerate statin so started on Repatha. Zetia started 1/2023 as LDL was in the low 100s with improvement in LDL. Last 70 (9/2024)  - Continue repatha and zetia at current doses  - Continue daily baby ASA  - Repeat lipids due and ordered for today, LDL goal <70         Relevant Medications    ezetimibe (ZETIA) 10 MG tablet    Atherosclerosis of native coronary artery of native heart without angina pectoris     As per CAC.          Relevant Medications    evolocumab (REPATHA) 140 MG/ML prefilled autoinjector       Musculoskeletal and Integumentary    Other osteoporosis without current pathological fracture (Chronic)     Patient's bone density has continued did respond very well to Prolia, last DEXA 3/2023 showed 2% increase in BMD in both her hips and spine.  With low  "fracture risk, patient to be class II \"to see if the gains \"made with Prolia.  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 S/p L thumb joint replacement 4/2024 and R 12/2024.   - Follows closely with [facility]         Relevant Medications    naproxen sodium (ANAPROX) 220 MG tablet       Immune    Sjogren's syndrome     Follows with Dr. Arce from rheumatology.   - Continue q6 month f/u with rheum  - Continue imuran 50 mg daily         Relevant Medications    azelaic acid (FINACIA) 15 % external gel    naproxen sodium (ANAPROX) 220 MG tablet       Urinary    Atrophic vaginitis     Well-controlled with Estrace cream. Refills provided today.         Relevant Medications    estradiol (ESTRACE) 0.1 MG/GM vaginal cream       Hematologic    Platelet disorder (H) (Chronic)     EDTA resistant, clumps easily.          Relevant Orders    CBC with platelets (Completed)       Infectious/Inflammatory    Multiple evanescent white dot syndrome (mewds), right     New dx in last year with vision changes in R eye. Following with retinal specialist and rheumatologist.             Other    Insomnia     Well-controlled with nightly trazodone 200 mg and Seroquel 100 mg.         Relevant Medications    QUEtiapine (SEROQUEL) 100 MG tablet    traZODone (DESYREL) 100 MG tablet    Routine general medical examination at a health care facility - Primary     We discussed healthy lifestyle, nutrition, cardiovascular risk reduction, self care, safety, sunscreen, and timing of cancer screening.  Health maintenance screening and " immunizations reviewed with the patient.    - Mammo BIRADS 1 3/2025  - Pap done 11/2024 and negative, likely last one   - Colonoscopy 6/2024, repeat 3 years  - Update labs today   - She is up to date on all vaccines          Relevant Orders    Comprehensive metabolic panel    CBC with platelets (Completed)    Hemoglobin A1c (Completed)    TSH with free T4 reflex     Other Visit Diagnoses       Statin intolerance        Relevant Medications    evolocumab (REPATHA) 140 MG/ML prefilled autoinjector             Patient has been advised of split billing requirements and indicates understanding: Yes    Counseling  Appropriate preventive services were addressed with this patient via screening, questionnaire, or discussion as appropriate for fall prevention, nutrition, physical activity, Tobacco-use cessation, social engagement, weight loss and cognition.  Checklist reviewing preventive services available has been given to the patient.  Reviewed patient's diet, addressing concerns and/or questions.     The longitudinal plan of care for the diagnosis(es)/condition(s) as documented were addressed during this visit. Due to the added complexity in care, I will continue to support Radha in the subsequent management and with ongoing continuity of care.    FUTURE APPOINTMENTS:       - Follow-up visit in 6 months       Subjective   Radha is a 64 year old, presenting for the following:  Physical        3/26/2025     8:17 AM   Additional Questions   Roomed by KITTY Jordan       History of Present Illness     Reason for visit:  Physical and medication management    She did meet with cancer risk management at [facility] 3/20/2025, they are going to pursue genetic testing for a possible hereditary cancer syndrome with her family history of cancers.    Found to have MEWDS in her R eye. Following with ophthalmology (retina) and rheumatology. Typically self limiting. Is getting better.     Osteoporosis: Last DEXA 3/2023 showed 2% increase in  BMD in hips and spine. Insurance no longer wanted to cover Prolia, so we switched to Reclast, she had infusion 4/19/24.      ILD/NSIP: Had repeat HR CT 10/2024 NSIP/fibrosis/volume loss stable. [facility] ([facility]. Stable with imuran 50 mg daily      COPD/restrictive lung disease Last saw Dr. Berry 10/4/24. Trial decrease in symbicort from 2 puffs to 1 puff daily given stability. Tiral aerobika flutter valve for intermittent wheezing.      Sjogren's: Follows with Dr. Arce (rheum), sees every 6 months, last visit 12/2/24, stable CBC and CRP normal. On imuran 50 mg daily.   - Eye exam 3/10/25     CAD: CAC score of 71 (9/19/22), unable to tolerate high dose statin so started on Repatha. Zetia added in 2023. LDL 70 (9/2024). Also taking ASA 81 mg daily.      Insomnia: Seroquel 100 mg daily, trazodone 200 mg at bedtime. This is pretty good.      HTN: spironolactone/hydrochlorothiazide 25/25 mg daily. BP today 111/70.      Atrophic vaginitis: estrace cream working well      GERD: pepcid 20 mg daily, working well      Muscle tightness: Methocarbamol about the same effectiveness as flexeril.      Allergies: zyrtec 10 mg daily      Arthritis: L thumb surgery 4/10/24. Ganglion cyst removal 6/20/24. R thumb surgery 12/2024.    HCM: Mammo BIRADS1 3/7/25. Pap NILM with neg HPV 11/11/24.     She eats 4 or more servings of fruits and vegetables daily.She consumes 2 sweetened beverage(s) daily.She exercises with enough effort to increase her heart rate 30 to 60 minutes per day.  She exercises with enough effort to increase her heart rate 5 days per week.     She is taking medications regularly.    Advance Care Planning  Patient has a Health Care Directive on file  Advance care planning document is on file and is current.      3/21/2025   General Health   How would you rate your overall physical health? Excellent   Feel stress (tense, anxious, or unable to sleep) Only a little   (!) STRESS CONCERN       3/21/2025   Nutrition   Three or more servings of calcium each day? Yes   Diet: Regular (no restrictions)   How many servings of fruit and vegetables per day? 4 or more   How many sweetened beverages each day? (!) 2         3/21/2025   Exercise   Days per week of moderate/strenous exercise 6 days   Average minutes spent exercising at this level 30 min         3/21/2025   Social Factors   Frequency of gathering with friends or relatives Twice a week   Worry food won't last until get money to buy more No   Food not last or not have enough money for food? No   Do you have housing? (Housing is defined as stable permanent housing and does not include staying ouside in a car, in a tent, in an abandoned building, in an overnight shelter, or couch-surfing.) Yes   Are you worried about losing your housing? No   Lack of transportation? No   Unable to get utilities (heat,electricity)? No         3/21/2025   Fall Risk   Fallen 2 or more times in the past year? Yes   Trouble with walking or balance? No           3/21/2025   Dental   Dentist two times every year? Yes           3/18/2024   TB Screening   Were you born outside of the [place]? No           Today's PHQ-2 Score:       3/25/2025     9:27 AM   PHQ-2 ( 1999 [employer])   Q1: Little interest or pleasure in doing things 0   Q2: Feeling down, depressed or hopeless 0   PHQ-2 Score 0    Q1: Little interest or pleasure in doing things Not at all   Q2: Feeling down, depressed or hopeless Not at all   PHQ-2 Score 0       Patient-reported           3/21/2025   Substance Use   Alcohol more than 3/day or more than 7/wk No   Do you use any other substances recreationally? No     Social History     Tobacco Use    Smoking status: Never     Passive exposure: Never    Smokeless tobacco: Never   Vaping Use    Vaping status: Never Used   Substance Use Topics    Alcohol use: Yes     Comment: 4 drinks per week    Drug use: No          Mammogram Screening - Mammogram every 1-2 years updated in Health  Maintenance based on mutual decision making        3/21/2025   STI Screening   New sexual partner(s) since last STI/HIV test? No     History of abnormal Pap smear: No - age 30- 64 PAP with HPV every 5 years recommended        Latest Ref Rng & Units 11/11/2024    11:45 AM 11/7/2022    12:30 PM 2/26/2021     1:26 PM   PAP / HPV   PAP  Negative for Intraepithelial Lesion or Malignancy (NILM)  Negative for Intraepithelial Lesion or Malignancy (NILM)  Negative for squamous intraepithelial lesion or malignancy  Electronically signed by Lizbet Stafford CT (ASCP) on 3/8/2021 at  3:30 PM      HPV 16 DNA Negative Negative   Negative    HPV 18 DNA Negative Negative   Negative    Other HR HPV Negative Negative   Negative      ASCVD Risk   The ASCVD Risk score (Inocencio DK, et al., 2019) failed to calculate for the following reasons:    The valid HDL cholesterol range is 20 to 100 mg/dL           Reviewed and updated as needed this visit by Provider   Tobacco  Allergies  Meds  Problems  Med Hx  Surg Hx  Fam Hx            Past Medical History:   Diagnosis Date    Arthritis 6/1/1999    Cervicalgia     Heart disease 7/13/2021    Calcification on an aorta    Idiopathic thrombocytopenic purpura (H)     Interstitial lung disease (H)     Interstitial pulmonary disease (H)     Lumbar radiculopathy     Osteopenia     Raynaud phenomenon     Right shoulder pain     Sjoegren syndrome     Uncomplicated asthma 6/9/2022    Vasomotor rhinitis      Past Surgical History:   Procedure Laterality Date    BIOPSY  7/2012, 11/7/2022    surgical lung biopsy, uterine biopsy    COLONOSCOPY N/A 08/29/2023    Procedure: Colonoscopy WITH POLYPECTOMY;  Surgeon: Eda Clements MD;  Location: [facility] OR    COLONOSCOPY N/A 6/6/2024    Procedure: COLONOSCOPY;  Surgeon: Gregorio Kunz MD;  Location:  GI    EYE SURGERY  3/3/22    cataract    HC REPAIR OF NASAL SEPTUM      Description: Septoplasty;  Recorded: 07/12/2012;    IR CERVICAL EPIDURAL  "STEROID INJECTION  03/23/2009    ORTHOPEDIC SURGERY  7/2015, 12/29/21    shoulder, Guyan canal release    TX THORACOSCOPY W/DX BX OF LUNG INFILTRATE UNILATRL      Description: Thoracoscopy Of Lungs And Pleural Space With Biopsy Of Lung Infiltrates;  Recorded: 07/23/2012;       Review of Systems  Constitutional, neuro, ENT, endocrine, pulmonary, cardiac, gastrointestinal, genitourinary, musculoskeletal, integument and psychiatric systems are negative, except as otherwise noted.     Objective    Exam  /70   Pulse 75   Temp 97.7  F (36.5  C) (Oral)   Resp 16   Ht 1.676 m (5' 6\")   Wt 55.7 kg (122 lb 11.2 oz)   LMP  (LMP Unknown)   SpO2 97%   BMI 19.80 kg/m     Estimated body mass index is 19.8 kg/m  as calculated from the following:    Height as of this encounter: 1.676 m (5' 6\").    Weight as of this encounter: 55.7 kg (122 lb 11.2 oz).    Physical Exam  GENERAL: alert and no distress  EYES: Eyes grossly normal to inspection and conjunctivae and sclerae normal  HENT: ear canals and TM's normal, nose and mouth without ulcers or lesions  NECK: no adenopathy, no asymmetry, masses, or scars  RESP: lungs clear to auscultation - no rales, rhonchi or wheezes  CV: regular rate and rhythm, normal S1 S2, no S3 or S4, no murmur, click or rub, no peripheral edema  ABDOMEN: soft, nontender, no hepatosplenomegaly, no masses and bowel sounds normal  MS: no gross musculoskeletal defects noted, no edema  SKIN: no suspicious lesions or rashes on exposed skin   NEURO: Normal strength and tone, mentation intact and speech normal  PSYCH: mentation appears normal, affect normal/bright      Signed Electronically by: Deisi Marin MD    "

## 2025-03-26 NOTE — ASSESSMENT & PLAN NOTE
We discussed healthy lifestyle, nutrition, cardiovascular risk reduction, self care, safety, sunscreen, and timing of cancer screening.  Health maintenance screening and immunizations reviewed with the patient.    - Mammo BIRADS 1 3/2025  - Pap done 11/2024 and negative, likely last one   - Colonoscopy 6/2024, repeat 3 years  - Update labs today   - She is up to date on all vaccines

## 2025-03-26 NOTE — ASSESSMENT & PLAN NOTE
"Patient's bone density has continued did respond very well to Prolia, last DEXA 3/2023 showed 2% increase in BMD in both her hips and spine.  With low fracture risk, patient to be class II \"to see if the gains \"made with Prolia.  Had first infusion 4/19/2024, tolerated well   - We are going to get another DEXA scan to determine if we need to continue with 2 more Reclast infusions or not, ordered today  - Check vitamin D  "

## 2025-04-03 ENCOUNTER — ANCILLARY PROCEDURE (OUTPATIENT)
Dept: BONE DENSITY | Facility: CLINIC | Age: 65
End: 2025-04-03
Attending: INTERNAL MEDICINE
Payer: COMMERCIAL

## 2025-04-03 DIAGNOSIS — M81.8 OTHER OSTEOPOROSIS WITHOUT CURRENT PATHOLOGICAL FRACTURE: Chronic | ICD-10-CM

## 2025-04-03 PROCEDURE — 77091 TBS TECHL CALCULATION ONLY: CPT | Performed by: PHYSICIAN ASSISTANT

## 2025-04-03 PROCEDURE — 77080 DXA BONE DENSITY AXIAL: CPT | Mod: TC | Performed by: PHYSICIAN ASSISTANT

## 2025-04-07 ENCOUNTER — TRANSFERRED RECORDS (OUTPATIENT)
Dept: HEALTH INFORMATION MANAGEMENT | Facility: CLINIC | Age: 65
End: 2025-04-07
Payer: COMMERCIAL

## 2025-04-07 ENCOUNTER — DOCUMENTATION ONLY (OUTPATIENT)
Dept: LAB | Facility: CLINIC | Age: 65
End: 2025-04-07
Payer: COMMERCIAL

## 2025-04-07 ENCOUNTER — MYC MEDICAL ADVICE (OUTPATIENT)
Dept: INTERNAL MEDICINE | Facility: CLINIC | Age: 65
End: 2025-04-07
Payer: COMMERCIAL

## 2025-04-07 NOTE — PROGRESS NOTES
Unclear what labs she needs for Dr. Romo? Can we call her and see if I was supposed to place orders or if he was placing?

## 2025-04-07 NOTE — PROGRESS NOTES
Radha BREANNA Morrison has an upcoming lab appointment:    Future Appointments   Date Time Provider Department Center   4/8/2025  8:45 AM \[facility]""W LAB [facility]R [facility]   4/21/2025  2:00 PM [facility] INFUSION CHAIR [facility]   10/13/2025  8:30 AM [facility]     Patient is scheduled for the following lab(s): I just need blood drawn and results faxed to [phone], Dr. Ayan Romo.     There is no order available. Please review and place either future orders or HMPO (Review of Health Maintenance Protocol Orders), as appropriate.    Health Maintenance Due   Topic    ANNUAL REVIEW OF HM ORDERS      YOKASTA PRIETO

## 2025-04-08 ENCOUNTER — LAB (OUTPATIENT)
Dept: LAB | Facility: CLINIC | Age: 65
End: 2025-04-08
Payer: COMMERCIAL

## 2025-04-08 DIAGNOSIS — H44.111 PANUVEITIS OF RIGHT EYE: Primary | ICD-10-CM

## 2025-04-08 LAB
ALBUMIN SERPL BCG-MCNC: 4.7 G/DL (ref 3.5–5.2)
ALP SERPL-CCNC: 94 U/L (ref 40–150)
ALT SERPL W P-5'-P-CCNC: 17 U/L (ref 0–50)
ANION GAP SERPL CALCULATED.3IONS-SCNC: 14 MMOL/L (ref 7–15)
AST SERPL W P-5'-P-CCNC: 30 U/L (ref 0–45)
BILIRUB SERPL-MCNC: 0.3 MG/DL
BUN SERPL-MCNC: 20.6 MG/DL (ref 8–23)
CALCIUM SERPL-MCNC: 10 MG/DL (ref 8.8–10.4)
CHLORIDE SERPL-SCNC: 100 MMOL/L (ref 98–107)
CREAT SERPL-MCNC: 1.03 MG/DL (ref 0.51–0.95)
EGFRCR SERPLBLD CKD-EPI 2021: 60 ML/MIN/1.73M2
ERYTHROCYTE [DISTWIDTH] IN BLOOD BY AUTOMATED COUNT: 12.3 % (ref 10–15)
GLUCOSE SERPL-MCNC: 69 MG/DL (ref 70–99)
HCO3 SERPL-SCNC: 28 MMOL/L (ref 22–29)
HCT VFR BLD AUTO: 45.7 % (ref 35–47)
HGB BLD-MCNC: 15 G/DL (ref 11.7–15.7)
MCH RBC QN AUTO: 30.1 PG (ref 26.5–33)
MCHC RBC AUTO-ENTMCNC: 32.8 G/DL (ref 31.5–36.5)
MCV RBC AUTO: 92 FL (ref 78–100)
PLATELET # BLD AUTO: 167 10E3/UL (ref 150–450)
POTASSIUM SERPL-SCNC: 3.9 MMOL/L (ref 3.4–5.3)
PROT SERPL-MCNC: 7.4 G/DL (ref 6.4–8.3)
RBC # BLD AUTO: 4.99 10E6/UL (ref 3.8–5.2)
SODIUM SERPL-SCNC: 142 MMOL/L (ref 135–145)
WBC # BLD AUTO: 5.9 10E3/UL (ref 4–11)

## 2025-04-08 PROCEDURE — 86481 TB AG RESPONSE T-CELL SUSP: CPT

## 2025-04-08 PROCEDURE — 80053 COMPREHEN METABOLIC PANEL: CPT

## 2025-04-08 PROCEDURE — 36415 COLL VENOUS BLD VENIPUNCTURE: CPT

## 2025-04-08 PROCEDURE — 85027 COMPLETE CBC AUTOMATED: CPT

## 2025-04-09 LAB
GAMMA INTERFERON BACKGROUND BLD IA-ACNC: 0.08 IU/ML
M TB IFN-G BLD-IMP: NEGATIVE
M TB IFN-G CD4+ BCKGRND COR BLD-ACNC: 9.92 IU/ML
MITOGEN IGNF BCKGRD COR BLD-ACNC: -0.02 IU/ML
MITOGEN IGNF BCKGRD COR BLD-ACNC: 0 IU/ML
QUANTIFERON MITOGEN: 10 IU/ML
QUANTIFERON NIL TUBE: 0.08 IU/ML
QUANTIFERON TB1 TUBE: 0.08 IU/ML
QUANTIFERON TB2 TUBE: 0.06

## 2025-04-17 ENCOUNTER — HOSPITAL ENCOUNTER (OUTPATIENT)
Dept: MRI IMAGING | Facility: HOSPITAL | Age: 65
Discharge: HOME | End: 2025-04-17
Attending: OPHTHALMOLOGY
Payer: COMMERCIAL

## 2025-04-17 ENCOUNTER — HOSPITAL ENCOUNTER (OUTPATIENT)
Dept: RADIOLOGY | Facility: HOSPITAL | Age: 65
Discharge: HOME | End: 2025-04-17
Attending: OPHTHALMOLOGY
Payer: COMMERCIAL

## 2025-04-17 DIAGNOSIS — H44.111 PANUVEITIS OF RIGHT EYE: ICD-10-CM

## 2025-04-17 PROCEDURE — 70543 MRI ORBT/FAC/NCK W/O &W/DYE: CPT

## 2025-04-17 PROCEDURE — 71046 X-RAY EXAM CHEST 2 VIEWS: CPT

## 2025-04-17 PROCEDURE — 255N000002 HC RX 255 OP 636: Performed by: OPHTHALMOLOGY

## 2025-04-17 PROCEDURE — A9585 GADOBUTROL INJECTION: HCPCS | Performed by: OPHTHALMOLOGY

## 2025-04-17 RX ORDER — GADOBUTROL 604.72 MG/ML
0.1 INJECTION INTRAVENOUS ONCE
Status: COMPLETED | OUTPATIENT
Start: 2025-04-17 | End: 2025-04-17

## 2025-04-17 RX ADMIN — GADOBUTROL 5.57 ML: 604.72 INJECTION INTRAVENOUS at 11:32

## 2025-04-30 ENCOUNTER — INFUSION THERAPY VISIT (OUTPATIENT)
Dept: INFUSION THERAPY | Facility: HOSPITAL | Age: 65
End: 2025-04-30
Attending: INTERNAL MEDICINE
Payer: COMMERCIAL

## 2025-04-30 VITALS
SYSTOLIC BLOOD PRESSURE: 114 MMHG | RESPIRATION RATE: 16 BRPM | DIASTOLIC BLOOD PRESSURE: 57 MMHG | HEART RATE: 78 BPM | OXYGEN SATURATION: 97 % | TEMPERATURE: 98.5 F

## 2025-04-30 DIAGNOSIS — M81.8 OTHER OSTEOPOROSIS WITHOUT CURRENT PATHOLOGICAL FRACTURE: Primary | ICD-10-CM

## 2025-04-30 PROCEDURE — 258N000003 HC RX IP 258 OP 636: Performed by: INTERNAL MEDICINE

## 2025-04-30 PROCEDURE — 250N000011 HC RX IP 250 OP 636: Mod: JZ | Performed by: INTERNAL MEDICINE

## 2025-04-30 PROCEDURE — 96365 THER/PROPH/DIAG IV INF INIT: CPT

## 2025-04-30 RX ORDER — HEPARIN SODIUM,PORCINE 10 UNIT/ML
5-20 VIAL (ML) INTRAVENOUS DAILY PRN
Status: DISCONTINUED | OUTPATIENT
Start: 2025-04-30 | End: 2025-04-30 | Stop reason: HOSPADM

## 2025-04-30 RX ORDER — ZOLEDRONIC ACID 0.05 MG/ML
5 INJECTION, SOLUTION INTRAVENOUS ONCE
Status: COMPLETED | OUTPATIENT
Start: 2025-04-30 | End: 2025-04-30

## 2025-04-30 RX ORDER — HEPARIN SODIUM (PORCINE) LOCK FLUSH IV SOLN 100 UNIT/ML 100 UNIT/ML
5 SOLUTION INTRAVENOUS
OUTPATIENT
Start: 2026-04-30

## 2025-04-30 RX ORDER — HEPARIN SODIUM,PORCINE 10 UNIT/ML
5-20 VIAL (ML) INTRAVENOUS DAILY PRN
OUTPATIENT
Start: 2026-04-30

## 2025-04-30 RX ORDER — MEPERIDINE HYDROCHLORIDE 25 MG/ML
25 INJECTION INTRAMUSCULAR; INTRAVENOUS; SUBCUTANEOUS
OUTPATIENT
Start: 2026-04-30

## 2025-04-30 RX ORDER — EPINEPHRINE 1 MG/ML
0.3 INJECTION, SOLUTION INTRAMUSCULAR; SUBCUTANEOUS EVERY 5 MIN PRN
OUTPATIENT
Start: 2026-04-30

## 2025-04-30 RX ORDER — ALBUTEROL SULFATE 0.83 MG/ML
2.5 SOLUTION RESPIRATORY (INHALATION)
OUTPATIENT
Start: 2026-04-30

## 2025-04-30 RX ORDER — ZOLEDRONIC ACID 0.05 MG/ML
5 INJECTION, SOLUTION INTRAVENOUS ONCE
Start: 2026-04-30

## 2025-04-30 RX ORDER — HEPARIN SODIUM (PORCINE) LOCK FLUSH IV SOLN 100 UNIT/ML 100 UNIT/ML
5 SOLUTION INTRAVENOUS
Status: DISCONTINUED | OUTPATIENT
Start: 2025-04-30 | End: 2025-04-30 | Stop reason: HOSPADM

## 2025-04-30 RX ORDER — DIPHENHYDRAMINE HYDROCHLORIDE 50 MG/ML
25 INJECTION, SOLUTION INTRAMUSCULAR; INTRAVENOUS
Start: 2026-04-30

## 2025-04-30 RX ORDER — DIPHENHYDRAMINE HYDROCHLORIDE 50 MG/ML
50 INJECTION, SOLUTION INTRAMUSCULAR; INTRAVENOUS
Start: 2026-04-30

## 2025-04-30 RX ORDER — ALBUTEROL SULFATE 90 UG/1
1-2 INHALANT RESPIRATORY (INHALATION)
Start: 2026-04-30

## 2025-04-30 RX ORDER — ACETAMINOPHEN 325 MG/1
650 TABLET ORAL
OUTPATIENT
Start: 2026-04-30

## 2025-04-30 RX ORDER — METHYLPREDNISOLONE SODIUM SUCCINATE 40 MG/ML
40 INJECTION INTRAMUSCULAR; INTRAVENOUS
Start: 2026-04-30

## 2025-04-30 RX ADMIN — SODIUM CHLORIDE 250 ML: 9 INJECTION, SOLUTION INTRAVENOUS at 09:31

## 2025-04-30 RX ADMIN — ZOLEDRONIC ACID 5 MG: 0.05 INJECTION, SOLUTION INTRAVENOUS at 09:40

## 2025-04-30 NOTE — PROGRESS NOTES
Infusion Nursing Note:  Radha Morrison presents today for IV reclast.    Patient seen by provider today: No   present during visit today: Not Applicable.    Note: Tolerated reclast well, she offers no complaints.  Discharge to home ambulating.  All questions answered..    Premeds Given: none    Intravenous Access:  Peripheral IV placed.    Treatment Conditions:  Results reviewed, labs MET treatment parameters, ok to proceed with treatment.      Post Infusion Assessment:  Patient tolerated infusion without incident.  No evidence of extravasations.  Access discontinued per protocol.       Discharge Plan:   Patient and/or family verbalized understanding of discharge instructions and all questions answered.      Josefina Mcdermott RN

## 2025-05-01 ENCOUNTER — MYC MEDICAL ADVICE (OUTPATIENT)
Dept: INTERNAL MEDICINE | Facility: CLINIC | Age: 65
End: 2025-05-01
Payer: COMMERCIAL

## 2025-06-03 ENCOUNTER — TRANSFERRED RECORDS (OUTPATIENT)
Dept: HEALTH INFORMATION MANAGEMENT | Facility: CLINIC | Age: 65
End: 2025-06-03
Payer: COMMERCIAL

## 2025-06-09 ENCOUNTER — TRANSFERRED RECORDS (OUTPATIENT)
Dept: HEALTH INFORMATION MANAGEMENT | Facility: CLINIC | Age: 65
End: 2025-06-09
Payer: COMMERCIAL

## 2025-06-24 ENCOUNTER — MYC MEDICAL ADVICE (OUTPATIENT)
Dept: INTERNAL MEDICINE | Facility: CLINIC | Age: 65
End: 2025-06-24
Payer: COMMERCIAL

## 2025-06-24 DIAGNOSIS — Z78.9 STATIN INTOLERANCE: ICD-10-CM

## 2025-06-24 DIAGNOSIS — I25.10 ATHEROSCLEROSIS OF NATIVE CORONARY ARTERY OF NATIVE HEART WITHOUT ANGINA PECTORIS: ICD-10-CM

## 2025-06-25 NOTE — TELEPHONE ENCOUNTER
Prescription resent to newly requested pharmacy with refills adjusted based off original prescription date.     Mirian Williamson RN

## 2025-07-02 ENCOUNTER — TRANSFERRED RECORDS (OUTPATIENT)
Dept: HEALTH INFORMATION MANAGEMENT | Facility: CLINIC | Age: 65
End: 2025-07-02
Payer: COMMERCIAL

## 2025-08-15 ENCOUNTER — MYC MEDICAL ADVICE (OUTPATIENT)
Dept: INTERNAL MEDICINE | Facility: CLINIC | Age: 65
End: 2025-08-15
Payer: COMMERCIAL

## (undated) DEVICE — WIPE PREMOIST CLEANSING WASHCLOTHS 7988

## (undated) DEVICE — GOWN IMPERVIOUS 2XL BLUE

## (undated) DEVICE — ENDO CAP AND TUBING STERILE FOR ENDOGATOR  100130

## (undated) DEVICE — KIT ENDO FIRST STEP DISINFECTANT 200ML W/POUCH EP-4

## (undated) DEVICE — SOL WATER IRRIG 1000ML BOTTLE 2F7114

## (undated) DEVICE — PACK ENDOSCOPY GI CUSTOM UMMC

## (undated) DEVICE — ENDO TRAP POLYP E-TRAP 00711099

## (undated) DEVICE — ASSURANCE CLIP 18MM

## (undated) DEVICE — Device

## (undated) DEVICE — ATTACHMENT CAP DISTAL REVEAL 15.0MM BX00711774

## (undated) DEVICE — SOL WATER IRRIG 500ML BOTTLE 2F7113

## (undated) DEVICE — SNARE CAPIVATOR II POLYPECTOMY 10X240MM M00561220

## (undated) DEVICE — SUCTION MANIFOLD NEPTUNE 2 SYS 4 PORT 0702-020-000

## (undated) DEVICE — PAD CHUX UNDERPAD 23X24" 7136

## (undated) DEVICE — KIT CONNECTOR FOR OLYMPUS ENDOSCOPES DEFENDO 100310

## (undated) DEVICE — SNARE CAPIVATOR II POLYPECTOMY 20X240MM M00561240

## (undated) DEVICE — SPECIMEN CONTAINER 3OZ W/FORMALIN 59901

## (undated) DEVICE — ENDO TUBING CO2 SMARTCAP STERILE DISP 100145CO2EXT

## (undated) DEVICE — KIT ENDO TURNOVER/PROCEDURE CARRY-ON 101822

## (undated) DEVICE — SNARE CAPIVATOR ROUND COLD SNR BX10 M00561101

## (undated) DEVICE — TUBING SUCTION MEDI-VAC 1/4"X20' N620A

## (undated) DEVICE — ENDO SNARE EXACTO COLD 9MM LOOP 2.4MMX230CM 00711115

## (undated) DEVICE — SUCTION MANIFOLD NEPTUNE 2 SYS 1 PORT 702-025-000

## (undated) RX ORDER — PROPOFOL 10 MG/ML
INJECTION, EMULSION INTRAVENOUS
Status: DISPENSED
Start: 2023-12-01

## (undated) RX ORDER — FENTANYL CITRATE-0.9 % NACL/PF 10 MCG/ML
PLASTIC BAG, INJECTION (ML) INTRAVENOUS
Status: DISPENSED
Start: 2023-12-01

## (undated) RX ORDER — FENTANYL CITRATE 50 UG/ML
INJECTION, SOLUTION INTRAMUSCULAR; INTRAVENOUS
Status: DISPENSED
Start: 2023-12-01